# Patient Record
Sex: FEMALE | URBAN - METROPOLITAN AREA
[De-identification: names, ages, dates, MRNs, and addresses within clinical notes are randomized per-mention and may not be internally consistent; named-entity substitution may affect disease eponyms.]

---

## 2023-07-24 ENCOUNTER — TELEPHONE (OUTPATIENT)
Age: 63
End: 2023-07-24

## 2023-08-14 ENCOUNTER — INITIAL CONSULT (OUTPATIENT)
Age: 63
End: 2023-08-14
Payer: SUBSIDIZED

## 2023-08-14 VITALS
HEART RATE: 86 BPM | BODY MASS INDEX: 26.61 KG/M2 | OXYGEN SATURATION: 96 % | SYSTOLIC BLOOD PRESSURE: 124 MMHG | TEMPERATURE: 97.9 F | RESPIRATION RATE: 16 BRPM | WEIGHT: 144.6 LBS | DIASTOLIC BLOOD PRESSURE: 76 MMHG | HEIGHT: 62 IN

## 2023-08-14 DIAGNOSIS — Z17.1 MALIGNANT NEOPLASM OF OVERLAPPING SITES OF RIGHT BREAST IN FEMALE, ESTROGEN RECEPTOR NEGATIVE (HCC): Primary | ICD-10-CM

## 2023-08-14 DIAGNOSIS — C50.811 MALIGNANT NEOPLASM OF OVERLAPPING SITES OF RIGHT BREAST IN FEMALE, ESTROGEN RECEPTOR NEGATIVE (HCC): Primary | ICD-10-CM

## 2023-08-14 PROCEDURE — 99205 OFFICE O/P NEW HI 60 MIN: CPT | Performed by: INTERNAL MEDICINE

## 2023-08-14 RX ORDER — AMLODIPINE BESYLATE 5 MG/1
TABLET ORAL
COMMUNITY
Start: 2023-02-01

## 2023-08-14 RX ORDER — CELECOXIB 200 MG/1
CAPSULE ORAL
COMMUNITY
Start: 2023-05-23

## 2023-08-14 RX ORDER — LIDOCAINE AND PRILOCAINE 25; 25 MG/G; MG/G
CREAM TOPICAL
Qty: 30 G | Refills: 5 | Status: SHIPPED | OUTPATIENT
Start: 2023-08-14

## 2023-08-14 RX ORDER — PROCHLORPERAZINE MALEATE 10 MG
10 TABLET ORAL EVERY 6 HOURS PRN
Qty: 60 TABLET | Refills: 3 | Status: SHIPPED | OUTPATIENT
Start: 2023-08-14

## 2023-08-14 RX ORDER — ONDANSETRON HYDROCHLORIDE 8 MG/1
8 TABLET, FILM COATED ORAL EVERY 8 HOURS PRN
Qty: 60 TABLET | Refills: 3 | Status: SHIPPED | OUTPATIENT
Start: 2023-08-14

## 2023-08-14 RX ORDER — ATORVASTATIN CALCIUM 40 MG/1
TABLET, FILM COATED ORAL
COMMUNITY
Start: 2023-07-28

## 2023-08-14 NOTE — PROGRESS NOTES
Elijah Garland at [facility]  [phone]    08/14/23 Chemotherapy/Immunotherapy education for enhertu and zometa via . Answered all questions. Consent discussed with the patient and the patient denied any questions or concerns. A hard copy of the chemotherapy consent was offered to the patient and the patient declined.
Suzan Eisenberg is a 61 y.o. female new patient here for evaluation of breast cancer.
above.      Assessment/plan:   1. Metastatic recurrent right breast cancer, ER negative, LA negative, HER 2 positive:      Mets to neck LN, hilar LN, left ax LN, liver, lung, bones    Last T-Dxd was 8/10/23, next due 9/5/23    She will cancel the PET for [address] on 8/29/23    Discussed that while this is treatable, it is not curable, goal of therapy is palliative. Will repeat PET scan to compare apples to apples, but will also get CT c/a/p and bone scan    Discussed side effects on T-Dxd including but not limited to alopecia, nausea, vomiting, fatigue, myelosuppression, heart damage, ILD, mouth sores, liver toxicity. She is agreeable to continue and has signed informed consent    Discussed that her EF has been stable for awhile on T-Dxd, will monitor for sx at this time as to not change life sustaining medication for a small number change    With T-Dxd, will get CT scans q 6-9 weeks    Discussed zometa 4 mg q 3 months IV:    We discussed side effects such as ONJ and the need to avoid invasive dental procedures while on these medications, renal damage, and hypocalcemia. Will start this at next visit    We will plan to see the patient in follow up at least once per cycle, or sooner if symptoms warrant. Labs with each cycle for intensive monitoring for toxicity    The duration of this treatment plan will be until progression, intolerable side effects, or patient choice. Rx:  zofran, compazine and emla cream      2. Emotional well being:  She has excellent support and is coping well with her disease    3. DM2:  was on metformin    4. Right Shoulder pain:  on celebrex; referral to PT    > 60 min were spent in this encounter with time spent in face to face discussion, extensive outside record review, and documentation    I appreciate the opportunity to participate in Ms. Georges Moser's care.     Signed By: Colonel Addie MD      Return in about 22 days (around 9/5/2023) for labs, claire, chemo T-Dxd and

## 2023-08-15 DIAGNOSIS — C50.919 MALIGNANT NEOPLASM OF BREAST IN FEMALE, ESTROGEN RECEPTOR NEGATIVE, UNSPECIFIED LATERALITY, UNSPECIFIED SITE OF BREAST (HCC): ICD-10-CM

## 2023-08-15 DIAGNOSIS — Z17.1 MALIGNANT NEOPLASM OF BREAST IN FEMALE, ESTROGEN RECEPTOR NEGATIVE, UNSPECIFIED LATERALITY, UNSPECIFIED SITE OF BREAST (HCC): ICD-10-CM

## 2023-08-18 ENCOUNTER — CLINICAL DOCUMENTATION (OUTPATIENT)
Age: 63
End: 2023-08-18

## 2023-08-18 NOTE — PROGRESS NOTES
Oncology Social Work  Psychosocial Assessment    Reason for Assessment:      [] Social Work Referral [] Initial Assessment [] New Diagnosis [] Other    Advance Care Planning:  Demographics 8/18/2023   Marital Status        Sources of Information:    []Patient  []Family  []Staff  []Medical Record    Mental Status:    []Alert  []Lethargic  []Unresponsive   [] Unable to assess   Oriented to:  []Person  []Place  []Time  []Situation      Relationship Status:  []Single  []  []Significant Other/Life Partner  []  []  []    Living Circumstances:  []Lives Alone  []Family/Significant Other in Household  []Roommates  []Children in the Home  []Paid Caregivers  []Assisted Living Facility/Group Home  [][address]  []Homeless  []Incarcerated  []Environmental/Care Concerns  []Other:    Employment Status:  []Employed Full-time []Employed Part-time []Homemaker  [] Retired [] Short-Term Disability [] Long-Term Disability  [] Unemployed   [] [address][1]   [] [facility]  [] Self-Employment    Barriers to Learning:    []Language  []Developmental  []Cognitive  []Altered Mental Status  []Visual/Hearing Impairment  []Unable to Read/Write  []Motivational  [] Challenges Understanding Medical Jargon []No Barriers Identified      Financial/Legal Concerns:    []Uninsured  []Limited Income/Resources  []Non-Citizen  []Food Insecurity []No Concerns Identified   []Other:    Catholic/Spiritual/Existential:  Does patient have any spiritual or Taoist beliefs? [] Yes [] No  Is the patient involved in a spiritual, nellie or Taoist community?  [] Yes [] No  Patient expressing spiritual/existential angst? [] Yes [] No  Notes:    Support System:    Identified Support Person/Group:  []Strong  []Fair  []Limited    Coping with Illness:   []  Coping Well  [] Challenges Coping with Serious Illness [] Situational Depression [] Situational Anxiety [] Anticipatory Grief  [] Recent Loss [] Caregiver [place]

## 2023-08-21 DIAGNOSIS — Z17.1 MALIGNANT NEOPLASM OF BREAST IN FEMALE, ESTROGEN RECEPTOR NEGATIVE, UNSPECIFIED LATERALITY, UNSPECIFIED SITE OF BREAST (HCC): Primary | ICD-10-CM

## 2023-08-21 DIAGNOSIS — C50.919 MALIGNANT NEOPLASM OF BREAST IN FEMALE, ESTROGEN RECEPTOR NEGATIVE, UNSPECIFIED LATERALITY, UNSPECIFIED SITE OF BREAST (HCC): Primary | ICD-10-CM

## 2023-08-21 RX ORDER — PALONOSETRON 0.05 MG/ML
0.25 INJECTION, SOLUTION INTRAVENOUS ONCE
OUTPATIENT
Start: 2023-09-05 | End: 2023-09-05

## 2023-08-21 RX ORDER — SODIUM CHLORIDE 9 MG/ML
5-250 INJECTION, SOLUTION INTRAVENOUS PRN
OUTPATIENT
Start: 2023-09-05

## 2023-08-21 RX ORDER — DEXTROSE MONOHYDRATE 50 MG/ML
5-250 INJECTION, SOLUTION INTRAVENOUS PRN
OUTPATIENT
Start: 2023-09-05

## 2023-08-21 RX ORDER — SODIUM CHLORIDE 0.9 % (FLUSH) 0.9 %
5-40 SYRINGE (ML) INJECTION PRN
OUTPATIENT
Start: 2023-09-05

## 2023-08-21 RX ORDER — EPINEPHRINE 1 MG/ML
0.3 INJECTION, SOLUTION, CONCENTRATE INTRAVENOUS PRN
OUTPATIENT
Start: 2023-09-05

## 2023-08-21 RX ORDER — DIPHENHYDRAMINE HYDROCHLORIDE 50 MG/ML
50 INJECTION INTRAMUSCULAR; INTRAVENOUS
OUTPATIENT
Start: 2023-09-05

## 2023-08-21 RX ORDER — ALBUTEROL SULFATE 90 UG/1
4 AEROSOL, METERED RESPIRATORY (INHALATION) PRN
OUTPATIENT
Start: 2023-09-05

## 2023-08-21 RX ORDER — ONDANSETRON 2 MG/ML
8 INJECTION INTRAMUSCULAR; INTRAVENOUS
OUTPATIENT
Start: 2023-09-05

## 2023-08-21 RX ORDER — SODIUM CHLORIDE 9 MG/ML
INJECTION, SOLUTION INTRAVENOUS CONTINUOUS
OUTPATIENT
Start: 2023-09-05

## 2023-08-21 RX ORDER — FAMOTIDINE 10 MG/ML
20 INJECTION, SOLUTION INTRAVENOUS
OUTPATIENT
Start: 2023-09-05

## 2023-08-21 RX ORDER — HEPARIN SODIUM (PORCINE) LOCK FLUSH IV SOLN 100 UNIT/ML 100 UNIT/ML
500 SOLUTION INTRAVENOUS PRN
OUTPATIENT
Start: 2023-09-05

## 2023-08-21 RX ORDER — ACETAMINOPHEN 325 MG/1
650 TABLET ORAL
OUTPATIENT
Start: 2023-09-05

## 2023-08-21 RX ORDER — MEPERIDINE HYDROCHLORIDE 50 MG/ML
12.5 INJECTION INTRAMUSCULAR; INTRAVENOUS; SUBCUTANEOUS PRN
OUTPATIENT
Start: 2023-09-05

## 2023-08-28 DIAGNOSIS — C50.811 MALIGNANT NEOPLASM OF OVERLAPPING SITES OF RIGHT BREAST IN FEMALE, ESTROGEN RECEPTOR NEGATIVE (HCC): Primary | ICD-10-CM

## 2023-08-28 DIAGNOSIS — Z17.1 MALIGNANT NEOPLASM OF OVERLAPPING SITES OF RIGHT BREAST IN FEMALE, ESTROGEN RECEPTOR NEGATIVE (HCC): Primary | ICD-10-CM

## 2023-09-05 ENCOUNTER — CLINICAL DOCUMENTATION (OUTPATIENT)
Facility: HOSPITAL | Age: 63
End: 2023-09-05

## 2023-09-05 ENCOUNTER — OFFICE VISIT (OUTPATIENT)
Age: 63
End: 2023-09-05
Payer: SELF-PAY

## 2023-09-05 ENCOUNTER — HOSPITAL ENCOUNTER (OUTPATIENT)
Facility: HOSPITAL | Age: 63
Setting detail: INFUSION SERIES
End: 2023-09-05
Payer: SELF-PAY

## 2023-09-05 VITALS
BODY MASS INDEX: 26.7 KG/M2 | RESPIRATION RATE: 18 BRPM | HEART RATE: 73 BPM | HEIGHT: 62 IN | WEIGHT: 145.1 LBS | DIASTOLIC BLOOD PRESSURE: 69 MMHG | TEMPERATURE: 98 F | SYSTOLIC BLOOD PRESSURE: 118 MMHG

## 2023-09-05 VITALS
RESPIRATION RATE: 17 BRPM | BODY MASS INDEX: 26.51 KG/M2 | DIASTOLIC BLOOD PRESSURE: 69 MMHG | WEIGHT: 145 LBS | HEART RATE: 77 BPM | OXYGEN SATURATION: 97 % | TEMPERATURE: 98 F | SYSTOLIC BLOOD PRESSURE: 116 MMHG

## 2023-09-05 DIAGNOSIS — C50.919 MALIGNANT NEOPLASM OF BREAST IN FEMALE, ESTROGEN RECEPTOR NEGATIVE, UNSPECIFIED LATERALITY, UNSPECIFIED SITE OF BREAST (HCC): Primary | ICD-10-CM

## 2023-09-05 DIAGNOSIS — C50.811 MALIGNANT NEOPLASM OF OVERLAPPING SITES OF RIGHT BREAST IN FEMALE, ESTROGEN RECEPTOR NEGATIVE (HCC): Primary | ICD-10-CM

## 2023-09-05 DIAGNOSIS — Z17.1 MALIGNANT NEOPLASM OF BREAST IN FEMALE, ESTROGEN RECEPTOR NEGATIVE, UNSPECIFIED LATERALITY, UNSPECIFIED SITE OF BREAST (HCC): Primary | ICD-10-CM

## 2023-09-05 DIAGNOSIS — Z17.1 MALIGNANT NEOPLASM OF BREAST IN FEMALE, ESTROGEN RECEPTOR NEGATIVE, UNSPECIFIED LATERALITY, UNSPECIFIED SITE OF BREAST (HCC): ICD-10-CM

## 2023-09-05 DIAGNOSIS — Z17.1 MALIGNANT NEOPLASM OF OVERLAPPING SITES OF RIGHT BREAST IN FEMALE, ESTROGEN RECEPTOR NEGATIVE (HCC): Primary | ICD-10-CM

## 2023-09-05 DIAGNOSIS — C50.919 MALIGNANT NEOPLASM OF BREAST IN FEMALE, ESTROGEN RECEPTOR NEGATIVE, UNSPECIFIED LATERALITY, UNSPECIFIED SITE OF BREAST (HCC): ICD-10-CM

## 2023-09-05 LAB
ALBUMIN SERPL-MCNC: 3.6 G/DL (ref 3.5–5)
ALBUMIN/GLOB SERPL: 0.9 (ref 1.1–2.2)
ALP SERPL-CCNC: 86 U/L (ref 45–117)
ALT SERPL-CCNC: 15 U/L (ref 12–78)
ANION GAP SERPL CALC-SCNC: 5 MMOL/L (ref 5–15)
AST SERPL-CCNC: 33 U/L (ref 15–37)
BASOPHILS # BLD: 0.1 K/UL (ref 0–0.1)
BASOPHILS NFR BLD: 2 % (ref 0–1)
BILIRUB SERPL-MCNC: 0.3 MG/DL (ref 0.2–1)
BUN SERPL-MCNC: 16 MG/DL (ref 6–20)
BUN/CREAT SERPL: 22 (ref 12–20)
CALCIUM SERPL-MCNC: 9 MG/DL (ref 8.5–10.1)
CHLORIDE SERPL-SCNC: 108 MMOL/L (ref 97–108)
CO2 SERPL-SCNC: 27 MMOL/L (ref 21–32)
CREAT SERPL-MCNC: 0.72 MG/DL (ref 0.55–1.02)
DIFFERENTIAL METHOD BLD: ABNORMAL
EOSINOPHIL # BLD: 0 K/UL (ref 0–0.4)
EOSINOPHIL NFR BLD: 1 % (ref 0–7)
ERYTHROCYTE [DISTWIDTH] IN BLOOD BY AUTOMATED COUNT: 16 % (ref 11.5–14.5)
GLOBULIN SER CALC-MCNC: 3.9 G/DL (ref 2–4)
GLUCOSE SERPL-MCNC: 156 MG/DL (ref 65–100)
HCT VFR BLD AUTO: 36.1 % (ref 35–47)
HGB BLD-MCNC: 11.8 G/DL (ref 11.5–16)
IMM GRANULOCYTES # BLD AUTO: 0 K/UL (ref 0–0.04)
IMM GRANULOCYTES NFR BLD AUTO: 0 % (ref 0–0.5)
LYMPHOCYTES # BLD: 1.1 K/UL (ref 0.8–3.5)
LYMPHOCYTES NFR BLD: 24 % (ref 12–49)
MCH RBC QN AUTO: 30.7 PG (ref 26–34)
MCHC RBC AUTO-ENTMCNC: 32.7 G/DL (ref 30–36.5)
MCV RBC AUTO: 94 FL (ref 80–99)
MONOCYTES # BLD: 0.4 K/UL (ref 0–1)
MONOCYTES NFR BLD: 10 % (ref 5–13)
NEUTS SEG # BLD: 2.9 K/UL (ref 1.8–8)
NEUTS SEG NFR BLD: 63 % (ref 32–75)
NRBC # BLD: 0 K/UL (ref 0–0.01)
NRBC BLD-RTO: 0 PER 100 WBC
PLATELET # BLD AUTO: 270 K/UL (ref 150–400)
PMV BLD AUTO: 8.3 FL (ref 8.9–12.9)
POTASSIUM SERPL-SCNC: 3.9 MMOL/L (ref 3.5–5.1)
PROT SERPL-MCNC: 7.5 G/DL (ref 6.4–8.2)
RBC # BLD AUTO: 3.84 M/UL (ref 3.8–5.2)
SODIUM SERPL-SCNC: 140 MMOL/L (ref 136–145)
WBC # BLD AUTO: 4.5 K/UL (ref 3.6–11)

## 2023-09-05 PROCEDURE — 2580000003 HC RX 258: Performed by: INTERNAL MEDICINE

## 2023-09-05 PROCEDURE — 99215 OFFICE O/P EST HI 40 MIN: CPT | Performed by: INTERNAL MEDICINE

## 2023-09-05 PROCEDURE — 80053 COMPREHEN METABOLIC PANEL: CPT

## 2023-09-05 PROCEDURE — 96413 CHEMO IV INFUSION 1 HR: CPT

## 2023-09-05 PROCEDURE — 96375 TX/PRO/DX INJ NEW DRUG ADDON: CPT

## 2023-09-05 PROCEDURE — 6360000002 HC RX W HCPCS: Performed by: NURSE PRACTITIONER

## 2023-09-05 PROCEDURE — 6360000002 HC RX W HCPCS: Performed by: INTERNAL MEDICINE

## 2023-09-05 PROCEDURE — 85025 COMPLETE CBC W/AUTO DIFF WBC: CPT

## 2023-09-05 RX ORDER — SODIUM CHLORIDE 9 MG/ML
5-250 INJECTION, SOLUTION INTRAVENOUS PRN
OUTPATIENT
Start: 2024-03-03

## 2023-09-05 RX ORDER — SODIUM CHLORIDE 0.9 % (FLUSH) 0.9 %
5-40 SYRINGE (ML) INJECTION PRN
OUTPATIENT
Start: 2024-03-03

## 2023-09-05 RX ORDER — ALBUTEROL SULFATE 90 UG/1
4 AEROSOL, METERED RESPIRATORY (INHALATION) PRN
OUTPATIENT
Start: 2024-03-03

## 2023-09-05 RX ORDER — HEPARIN SODIUM (PORCINE) LOCK FLUSH IV SOLN 100 UNIT/ML 100 UNIT/ML
500 SOLUTION INTRAVENOUS PRN
Status: CANCELLED | OUTPATIENT
Start: 2023-09-05

## 2023-09-05 RX ORDER — DIPHENHYDRAMINE HYDROCHLORIDE 50 MG/ML
50 INJECTION INTRAMUSCULAR; INTRAVENOUS
Status: CANCELLED | OUTPATIENT
Start: 2023-09-05

## 2023-09-05 RX ORDER — EPINEPHRINE 1 MG/ML
0.3 INJECTION, SOLUTION, CONCENTRATE INTRAVENOUS PRN
Status: CANCELLED | OUTPATIENT
Start: 2023-09-05

## 2023-09-05 RX ORDER — ONDANSETRON 2 MG/ML
8 INJECTION INTRAMUSCULAR; INTRAVENOUS
Status: CANCELLED | OUTPATIENT
Start: 2023-09-05

## 2023-09-05 RX ORDER — SODIUM CHLORIDE 9 MG/ML
INJECTION, SOLUTION INTRAVENOUS CONTINUOUS
OUTPATIENT
Start: 2024-03-03

## 2023-09-05 RX ORDER — ZOLEDRONIC ACID 0.04 MG/ML
4 INJECTION, SOLUTION INTRAVENOUS ONCE
Status: CANCELLED | OUTPATIENT
Start: 2023-09-05 | End: 2023-09-05

## 2023-09-05 RX ORDER — SODIUM CHLORIDE 0.9 % (FLUSH) 0.9 %
5-40 SYRINGE (ML) INJECTION PRN
Status: CANCELLED | OUTPATIENT
Start: 2023-09-05

## 2023-09-05 RX ORDER — HEPARIN 100 UNIT/ML
500 SYRINGE INTRAVENOUS PRN
OUTPATIENT
Start: 2024-03-03

## 2023-09-05 RX ORDER — ZOLEDRONIC ACID 0.04 MG/ML
4 INJECTION, SOLUTION INTRAVENOUS ONCE
Status: COMPLETED | OUTPATIENT
Start: 2023-09-05 | End: 2023-09-05

## 2023-09-05 RX ORDER — SODIUM CHLORIDE 9 MG/ML
5-250 INJECTION, SOLUTION INTRAVENOUS PRN
Status: DISCONTINUED | OUTPATIENT
Start: 2023-09-05 | End: 2023-09-06 | Stop reason: HOSPADM

## 2023-09-05 RX ORDER — DIPHENHYDRAMINE HYDROCHLORIDE 50 MG/ML
50 INJECTION INTRAMUSCULAR; INTRAVENOUS
OUTPATIENT
Start: 2024-03-03

## 2023-09-05 RX ORDER — ZOLEDRONIC ACID 0.04 MG/ML
4 INJECTION, SOLUTION INTRAVENOUS ONCE
Status: CANCELLED | OUTPATIENT
Start: 2024-03-03 | End: 2024-03-03

## 2023-09-05 RX ORDER — EPINEPHRINE 1 MG/ML
0.3 INJECTION, SOLUTION, CONCENTRATE INTRAVENOUS PRN
OUTPATIENT
Start: 2024-03-03

## 2023-09-05 RX ORDER — SODIUM CHLORIDE 0.9 % (FLUSH) 0.9 %
5-40 SYRINGE (ML) INJECTION PRN
Status: DISCONTINUED | OUTPATIENT
Start: 2023-09-05 | End: 2023-09-06 | Stop reason: HOSPADM

## 2023-09-05 RX ORDER — SODIUM CHLORIDE 9 MG/ML
5-250 INJECTION, SOLUTION INTRAVENOUS PRN
Status: CANCELLED | OUTPATIENT
Start: 2023-09-05

## 2023-09-05 RX ORDER — FAMOTIDINE 10 MG/ML
20 INJECTION, SOLUTION INTRAVENOUS
Status: CANCELLED | OUTPATIENT
Start: 2023-09-05

## 2023-09-05 RX ORDER — SODIUM CHLORIDE 9 MG/ML
INJECTION, SOLUTION INTRAVENOUS CONTINUOUS
Status: CANCELLED | OUTPATIENT
Start: 2023-09-05

## 2023-09-05 RX ORDER — ACETAMINOPHEN 325 MG/1
650 TABLET ORAL
Status: CANCELLED | OUTPATIENT
Start: 2023-09-05

## 2023-09-05 RX ORDER — ACETAMINOPHEN 325 MG/1
650 TABLET ORAL
OUTPATIENT
Start: 2024-03-03

## 2023-09-05 RX ORDER — DEXTROSE MONOHYDRATE 50 MG/ML
5-250 INJECTION, SOLUTION INTRAVENOUS PRN
Status: DISCONTINUED | OUTPATIENT
Start: 2023-09-05 | End: 2023-09-06 | Stop reason: HOSPADM

## 2023-09-05 RX ORDER — PALONOSETRON 0.05 MG/ML
0.25 INJECTION, SOLUTION INTRAVENOUS ONCE
Status: COMPLETED | OUTPATIENT
Start: 2023-09-05 | End: 2023-09-05

## 2023-09-05 RX ORDER — SODIUM CHLORIDE 9 MG/ML
5-250 INJECTION, SOLUTION INTRAVENOUS PRN
Status: CANCELLED | OUTPATIENT
Start: 2024-03-03

## 2023-09-05 RX ORDER — ALBUTEROL SULFATE 90 UG/1
4 AEROSOL, METERED RESPIRATORY (INHALATION) PRN
Status: CANCELLED | OUTPATIENT
Start: 2023-09-05

## 2023-09-05 RX ORDER — HEPARIN 100 UNIT/ML
500 SYRINGE INTRAVENOUS PRN
Status: DISCONTINUED | OUTPATIENT
Start: 2023-09-05 | End: 2023-09-06 | Stop reason: HOSPADM

## 2023-09-05 RX ORDER — ONDANSETRON 2 MG/ML
8 INJECTION INTRAMUSCULAR; INTRAVENOUS
OUTPATIENT
Start: 2024-03-03

## 2023-09-05 RX ADMIN — ZOLEDRONIC ACID 4 MG: 4 SOLUTION INTRAVENOUS at 14:19

## 2023-09-05 RX ADMIN — DEXTROSE MONOHYDRATE 25 ML/HR: 50 INJECTION, SOLUTION INTRAVENOUS at 14:50

## 2023-09-05 RX ADMIN — FAM-TRASTUZUMAB DERUXTECAN-NXKI 354 MG: 100 INJECTION, POWDER, LYOPHILIZED, FOR SOLUTION INTRAVENOUS at 14:53

## 2023-09-05 RX ADMIN — DEXAMETHASONE SODIUM PHOSPHATE 12 MG: 4 INJECTION, SOLUTION INTRA-ARTICULAR; INTRALESIONAL; INTRAMUSCULAR; INTRAVENOUS; SOFT TISSUE at 13:59

## 2023-09-05 RX ADMIN — PALONOSETRON HYDROCHLORIDE 0.25 MG: 0.25 INJECTION INTRAVENOUS at 13:56

## 2023-09-05 ASSESSMENT — PAIN SCALES - GENERAL: PAINLEVEL_OUTOF10: 0

## 2023-09-05 NOTE — PROGRESS NOTES
[facility] [place] at [address], [address] [place] [address] [place]: [phone]  F: [phone]      Reason for Visit:   Vanessa Egan is a 61 y.o. female who has transferred to me from [place]. Treatment History:   2016:  Original right stage 3 breast cancer, s/p mastectomy with 5 cm IDC, 3/19 LN, ER negative, [place] negative, HER 2 +, treated with chemotherapy (unknown) and XRT (in [place])  9/2018:  recurrent breast cancer to her neck, tx with docetaxel q 3 weeks x 9, completed 4/12/2019 (PD) Glenroy Gee)  6/25/19 L ax LN FNA:  poorly differentiated carcinoma, c/w breast cancer, ER negative, [place] negative, HER 2 positive at [facility] 3+  Tempus testing:  HER 2 overexpression  7/26/19 Trastuzumab/pertuzumab/paclitaxel until 7/2021 after 32 cycles (PD) [place], [place])  XRT to right neck 1/2021  T-DM1 8/10/21-  T-Dxd 7/2022-   Right shoulder dislocation and infection 11/17/22  Zometa 9/5/2023 -     History of Present Illness:   Recently moved to [place] to live with daughter. Interval Hx: Patient presents today for follow up and treatment. Reports gr 1 edema to right arm - no other symptoms. Entire visit was done with Yamilex Martin from [facility] services    History reviewed. No pertinent past medical history. History reviewed. No pertinent surgical history. Social History     Tobacco Use    Smoking status: Never    Smokeless tobacco: Never   Substance Use Topics    Alcohol use: Not on file      No family history on file.   Current Outpatient Medications   Medication Sig    celecoxib (CELEBREX) 200 MG capsule 1 cap(s) orally once a day    amLODIPine (NORVASC) 5 MG tablet 1 tab(s) orally once a day    atorvastatin (LIPITOR) 40 MG tablet TAKE 1 TABLET BY MOUTH ONCE DAILY FOR 30 DAYS    ondansetron (ZOFRAN) 8 MG tablet Take 1 tablet by mouth every 8 hours as needed for Nausea or Vomiting    prochlorperazine (COMPAZINE) 10 MG tablet Take 1 tablet by mouth every 6 hours as needed

## 2023-09-05 NOTE — PROGRESS NOTES
Alice Ricketts is a 61 y.o. female here today to follow up for breast cancer    1. Have you been to the ER, urgent care clinic since your last visit? Hospitalized since your last visit?no    2. Have you seen or consulted any other health care providers outside of the [address] [place] [address] since your last visit? Include any pap smears or colon screening.  no

## 2023-09-19 RX ORDER — SODIUM CHLORIDE 9 MG/ML
5-250 INJECTION, SOLUTION INTRAVENOUS PRN
Status: CANCELLED | OUTPATIENT
Start: 2023-09-26

## 2023-09-19 RX ORDER — PALONOSETRON 0.05 MG/ML
0.25 INJECTION, SOLUTION INTRAVENOUS ONCE
Status: CANCELLED | OUTPATIENT
Start: 2023-09-26 | End: 2023-09-26

## 2023-09-19 RX ORDER — EPINEPHRINE 1 MG/ML
0.3 INJECTION, SOLUTION, CONCENTRATE INTRAVENOUS PRN
Status: CANCELLED | OUTPATIENT
Start: 2023-09-26

## 2023-09-19 RX ORDER — DIPHENHYDRAMINE HYDROCHLORIDE 50 MG/ML
50 INJECTION INTRAMUSCULAR; INTRAVENOUS
Status: CANCELLED | OUTPATIENT
Start: 2023-09-26

## 2023-09-19 RX ORDER — DEXTROSE MONOHYDRATE 50 MG/ML
5-250 INJECTION, SOLUTION INTRAVENOUS PRN
Status: CANCELLED | OUTPATIENT
Start: 2023-09-26

## 2023-09-19 RX ORDER — ONDANSETRON 2 MG/ML
8 INJECTION INTRAMUSCULAR; INTRAVENOUS
Status: CANCELLED | OUTPATIENT
Start: 2023-09-26

## 2023-09-19 RX ORDER — ALBUTEROL SULFATE 90 UG/1
4 AEROSOL, METERED RESPIRATORY (INHALATION) PRN
Status: CANCELLED | OUTPATIENT
Start: 2023-09-26

## 2023-09-19 RX ORDER — SODIUM CHLORIDE 9 MG/ML
INJECTION, SOLUTION INTRAVENOUS CONTINUOUS
Status: CANCELLED | OUTPATIENT
Start: 2023-09-26

## 2023-09-19 RX ORDER — ACETAMINOPHEN 325 MG/1
650 TABLET ORAL
Status: CANCELLED | OUTPATIENT
Start: 2023-09-26

## 2023-09-19 RX ORDER — HEPARIN 100 UNIT/ML
500 SYRINGE INTRAVENOUS PRN
Status: CANCELLED | OUTPATIENT
Start: 2023-09-26

## 2023-09-19 RX ORDER — MEPERIDINE HYDROCHLORIDE 25 MG/ML
12.5 INJECTION INTRAMUSCULAR; INTRAVENOUS; SUBCUTANEOUS PRN
Status: CANCELLED | OUTPATIENT
Start: 2023-09-26

## 2023-09-19 RX ORDER — SODIUM CHLORIDE 0.9 % (FLUSH) 0.9 %
5-40 SYRINGE (ML) INJECTION PRN
Status: CANCELLED | OUTPATIENT
Start: 2023-09-26

## 2023-09-21 ENCOUNTER — HOSPITAL ENCOUNTER (OUTPATIENT)
Facility: HOSPITAL | Age: 63
Discharge: HOME | End: 2023-09-21
Attending: INTERNAL MEDICINE
Payer: SUBSIDIZED

## 2023-09-21 ENCOUNTER — HOSPITAL ENCOUNTER (OUTPATIENT)
Facility: HOSPITAL | Age: 63
End: 2023-09-21
Attending: INTERNAL MEDICINE
Payer: SUBSIDIZED

## 2023-09-21 DIAGNOSIS — Z17.1 MALIGNANT NEOPLASM OF OVERLAPPING SITES OF RIGHT BREAST IN FEMALE, ESTROGEN RECEPTOR NEGATIVE (HCC): ICD-10-CM

## 2023-09-21 DIAGNOSIS — C50.811 MALIGNANT NEOPLASM OF OVERLAPPING SITES OF RIGHT BREAST IN FEMALE, ESTROGEN RECEPTOR NEGATIVE (HCC): ICD-10-CM

## 2023-09-21 LAB
GLUCOSE BLD STRIP.AUTO-MCNC: 134 MG/DL (ref 65–117)
SERVICE CMNT-IMP: ABNORMAL

## 2023-09-21 PROCEDURE — A9552 F18 FDG: HCPCS | Performed by: INTERNAL MEDICINE

## 2023-09-21 PROCEDURE — 82962 GLUCOSE BLOOD TEST: CPT

## 2023-09-21 PROCEDURE — 78815 PET IMAGE W/CT SKULL-THIGH: CPT

## 2023-09-21 PROCEDURE — 3430000000 HC RX DIAGNOSTIC RADIOPHARMACEUTICAL: Performed by: INTERNAL MEDICINE

## 2023-09-21 RX ORDER — FLUDEOXYGLUCOSE F-18 500 MCI/ML
10 INJECTION INTRAVENOUS ONCE
Status: COMPLETED | OUTPATIENT
Start: 2023-09-21 | End: 2023-09-21

## 2023-09-21 RX ADMIN — FLUDEOXYGLUCOSE F-18 10 MILLICURIE: 500 INJECTION INTRAVENOUS at 07:50

## 2023-09-25 ENCOUNTER — HOSPITAL ENCOUNTER (OUTPATIENT)
Facility: HOSPITAL | Age: 63
Discharge: HOME | End: 2023-09-28
Attending: INTERNAL MEDICINE
Payer: SUBSIDIZED

## 2023-09-25 DIAGNOSIS — Z17.1 MALIGNANT NEOPLASM OF OVERLAPPING SITES OF RIGHT BREAST IN FEMALE, ESTROGEN RECEPTOR NEGATIVE (HCC): ICD-10-CM

## 2023-09-25 DIAGNOSIS — C50.811 MALIGNANT NEOPLASM OF OVERLAPPING SITES OF RIGHT BREAST IN FEMALE, ESTROGEN RECEPTOR NEGATIVE (HCC): ICD-10-CM

## 2023-09-25 PROCEDURE — 78306 BONE IMAGING WHOLE BODY: CPT | Performed by: INTERNAL MEDICINE

## 2023-09-25 PROCEDURE — 71260 CT THORAX DX C+: CPT

## 2023-09-25 PROCEDURE — A9503 TC99M MEDRONATE: HCPCS | Performed by: INTERNAL MEDICINE

## 2023-09-25 PROCEDURE — 6360000004 HC RX CONTRAST MEDICATION: Performed by: INTERNAL MEDICINE

## 2023-09-25 PROCEDURE — 3430000000 HC RX DIAGNOSTIC RADIOPHARMACEUTICAL: Performed by: INTERNAL MEDICINE

## 2023-09-25 PROCEDURE — 6360000002 HC RX W HCPCS: Performed by: INTERNAL MEDICINE

## 2023-09-25 RX ORDER — HEPARIN 100 UNIT/ML
500 SYRINGE INTRAVENOUS ONCE
Status: COMPLETED | OUTPATIENT
Start: 2023-09-25 | End: 2023-09-25

## 2023-09-25 RX ORDER — TC 99M MEDRONATE 20 MG/10ML
25.4 INJECTION, POWDER, LYOPHILIZED, FOR SOLUTION INTRAVENOUS
Status: COMPLETED | OUTPATIENT
Start: 2023-09-25 | End: 2023-09-25

## 2023-09-25 RX ADMIN — IOPAMIDOL 100 ML: 755 INJECTION, SOLUTION INTRAVENOUS at 08:18

## 2023-09-25 RX ADMIN — TC 99M MEDRONATE 25.4 MILLICURIE: 20 INJECTION, POWDER, LYOPHILIZED, FOR SOLUTION INTRAVENOUS at 07:55

## 2023-09-25 RX ADMIN — HEPARIN 500 UNITS: 100 SYRINGE at 08:25

## 2023-09-26 ENCOUNTER — HOSPITAL ENCOUNTER (OUTPATIENT)
Facility: HOSPITAL | Age: 63
Setting detail: INFUSION SERIES
End: 2023-09-26
Payer: SELF-PAY

## 2023-09-26 ENCOUNTER — CLINICAL DOCUMENTATION (OUTPATIENT)
Age: 63
End: 2023-09-26

## 2023-09-26 ENCOUNTER — OFFICE VISIT (OUTPATIENT)
Age: 63
End: 2023-09-26
Payer: SELF-PAY

## 2023-09-26 VITALS
HEART RATE: 82 BPM | HEIGHT: 62 IN | BODY MASS INDEX: 26.31 KG/M2 | OXYGEN SATURATION: 95 % | SYSTOLIC BLOOD PRESSURE: 126 MMHG | RESPIRATION RATE: 16 BRPM | TEMPERATURE: 98.1 F | DIASTOLIC BLOOD PRESSURE: 63 MMHG | WEIGHT: 143 LBS

## 2023-09-26 VITALS
HEART RATE: 82 BPM | DIASTOLIC BLOOD PRESSURE: 63 MMHG | TEMPERATURE: 98.1 F | OXYGEN SATURATION: 96 % | RESPIRATION RATE: 16 BRPM | BODY MASS INDEX: 26.15 KG/M2 | WEIGHT: 143 LBS | SYSTOLIC BLOOD PRESSURE: 126 MMHG

## 2023-09-26 DIAGNOSIS — Z17.1 MALIGNANT NEOPLASM OF BREAST IN FEMALE, ESTROGEN RECEPTOR NEGATIVE, UNSPECIFIED LATERALITY, UNSPECIFIED SITE OF BREAST (HCC): Primary | ICD-10-CM

## 2023-09-26 DIAGNOSIS — Z17.1 MALIGNANT NEOPLASM OF OVERLAPPING SITES OF RIGHT BREAST IN FEMALE, ESTROGEN RECEPTOR NEGATIVE (HCC): Primary | ICD-10-CM

## 2023-09-26 DIAGNOSIS — R05.1 ACUTE COUGH: ICD-10-CM

## 2023-09-26 DIAGNOSIS — C50.919 MALIGNANT NEOPLASM OF BREAST IN FEMALE, ESTROGEN RECEPTOR NEGATIVE, UNSPECIFIED LATERALITY, UNSPECIFIED SITE OF BREAST (HCC): Primary | ICD-10-CM

## 2023-09-26 DIAGNOSIS — C50.811 MALIGNANT NEOPLASM OF OVERLAPPING SITES OF RIGHT BREAST IN FEMALE, ESTROGEN RECEPTOR NEGATIVE (HCC): Primary | ICD-10-CM

## 2023-09-26 LAB
ALBUMIN SERPL-MCNC: 3.3 G/DL (ref 3.5–5)
ALBUMIN/GLOB SERPL: 0.8 (ref 1.1–2.2)
ALP SERPL-CCNC: 82 U/L (ref 45–117)
ALT SERPL-CCNC: 19 U/L (ref 12–78)
ANION GAP SERPL CALC-SCNC: 5 MMOL/L (ref 5–15)
AST SERPL-CCNC: 41 U/L (ref 15–37)
BASOPHILS # BLD: 0 K/UL (ref 0–0.1)
BASOPHILS NFR BLD: 1 % (ref 0–1)
BILIRUB SERPL-MCNC: 0.5 MG/DL (ref 0.2–1)
BUN SERPL-MCNC: 14 MG/DL (ref 6–20)
BUN/CREAT SERPL: 15 (ref 12–20)
CALCIUM SERPL-MCNC: 9.5 MG/DL (ref 8.5–10.1)
CHLORIDE SERPL-SCNC: 108 MMOL/L (ref 97–108)
CO2 SERPL-SCNC: 29 MMOL/L (ref 21–32)
CREAT SERPL-MCNC: 0.91 MG/DL (ref 0.55–1.02)
DIFFERENTIAL METHOD BLD: ABNORMAL
EOSINOPHIL # BLD: 0 K/UL (ref 0–0.4)
EOSINOPHIL NFR BLD: 1 % (ref 0–7)
ERYTHROCYTE [DISTWIDTH] IN BLOOD BY AUTOMATED COUNT: 17.2 % (ref 11.5–14.5)
GLOBULIN SER CALC-MCNC: 4.3 G/DL (ref 2–4)
GLUCOSE SERPL-MCNC: 197 MG/DL (ref 65–100)
HCT VFR BLD AUTO: 35.4 % (ref 35–47)
HGB BLD-MCNC: 11.4 G/DL (ref 11.5–16)
IMM GRANULOCYTES # BLD AUTO: 0 K/UL (ref 0–0.04)
IMM GRANULOCYTES NFR BLD AUTO: 0 % (ref 0–0.5)
LYMPHOCYTES # BLD: 0.9 K/UL (ref 0.8–3.5)
LYMPHOCYTES NFR BLD: 25 % (ref 12–49)
MCH RBC QN AUTO: 30.5 PG (ref 26–34)
MCHC RBC AUTO-ENTMCNC: 32.2 G/DL (ref 30–36.5)
MCV RBC AUTO: 94.7 FL (ref 80–99)
MONOCYTES # BLD: 0.4 K/UL (ref 0–1)
MONOCYTES NFR BLD: 10 % (ref 5–13)
NEUTS SEG # BLD: 2.4 K/UL (ref 1.8–8)
NEUTS SEG NFR BLD: 63 % (ref 32–75)
NRBC # BLD: 0 K/UL (ref 0–0.01)
NRBC BLD-RTO: 0 PER 100 WBC
PLATELET # BLD AUTO: 366 K/UL (ref 150–400)
PMV BLD AUTO: 9 FL (ref 8.9–12.9)
POTASSIUM SERPL-SCNC: 3 MMOL/L (ref 3.5–5.1)
PROT SERPL-MCNC: 7.6 G/DL (ref 6.4–8.2)
RBC # BLD AUTO: 3.74 M/UL (ref 3.8–5.2)
SODIUM SERPL-SCNC: 142 MMOL/L (ref 136–145)
WBC # BLD AUTO: 3.8 K/UL (ref 3.6–11)

## 2023-09-26 PROCEDURE — 36415 COLL VENOUS BLD VENIPUNCTURE: CPT

## 2023-09-26 PROCEDURE — 6370000000 HC RX 637 (ALT 250 FOR IP): Performed by: NURSE PRACTITIONER

## 2023-09-26 PROCEDURE — 99215 OFFICE O/P EST HI 40 MIN: CPT | Performed by: INTERNAL MEDICINE

## 2023-09-26 PROCEDURE — 85025 COMPLETE CBC W/AUTO DIFF WBC: CPT

## 2023-09-26 PROCEDURE — 36591 DRAW BLOOD OFF VENOUS DEVICE: CPT

## 2023-09-26 PROCEDURE — 80053 COMPREHEN METABOLIC PANEL: CPT

## 2023-09-26 RX ORDER — PREDNISONE 10 MG/1
30 TABLET ORAL DAILY
Qty: 10 TABLET | Refills: 0 | Status: CANCELLED | OUTPATIENT
Start: 2023-09-26 | End: 2023-10-06

## 2023-09-26 RX ORDER — AZITHROMYCIN 250 MG/1
250 TABLET, FILM COATED ORAL SEE ADMIN INSTRUCTIONS
Qty: 6 TABLET | Refills: 0 | Status: CANCELLED | OUTPATIENT
Start: 2023-09-26 | End: 2023-10-01

## 2023-09-26 RX ORDER — AZITHROMYCIN 250 MG/1
250 TABLET, FILM COATED ORAL SEE ADMIN INSTRUCTIONS
Qty: 6 TABLET | Refills: 0 | Status: SHIPPED | OUTPATIENT
Start: 2023-09-26 | End: 2023-10-01

## 2023-09-26 RX ORDER — POTASSIUM CHLORIDE 750 MG/1
40 TABLET, FILM COATED, EXTENDED RELEASE ORAL ONCE
Status: DISCONTINUED | OUTPATIENT
Start: 2023-09-26 | End: 2023-09-26

## 2023-09-26 RX ORDER — POTASSIUM CHLORIDE 750 MG/1
60 TABLET, EXTENDED RELEASE ORAL ONCE
Status: COMPLETED | OUTPATIENT
Start: 2023-09-26 | End: 2023-09-26

## 2023-09-26 RX ORDER — PREDNISONE 10 MG/1
TABLET ORAL
Qty: 70 TABLET | Refills: 0 | Status: SHIPPED | OUTPATIENT
Start: 2023-09-26 | End: 2023-10-24

## 2023-09-26 RX ADMIN — POTASSIUM CHLORIDE 60 MEQ: 750 TABLET, EXTENDED RELEASE ORAL at 13:20

## 2023-09-26 NOTE — PROGRESS NOTES
Javier Sparrow is a 61 y.o. female here today to follow up for breast cancer     1. Have you been to the ER, urgent care clinic since your last visit? Hospitalized since your last visit?no    2. Have you seen or consulted any other health care providers outside of the [address] [place] [address] since your last visit? Include any pap smears or colon screening.  no
lesion detection sensitivity is low. PERITONEUM/ MESENTERY/BOWEL:   *   Physiologic tracer excretion in the bowel loops and therefore underlying lesion detection sensitivity is low. There is no bowel obstruction or ascites. SUBDIAPHRAGMATIC LYMPH NODES:   *   No hypermetabolic lymphadenopathy in the scanned regions. PELVIS:   *   No hypermetabolic pelvic mass lesion. No free fluid in the pelvis. Physiologic tracer accumulation in the bladder and therefore underlying lesion detection sensitivity is low. BONES AND SOFT TISSUES:   *  Status post right mastectomy. Mild FDG uptake in the left breast with maximum SUV of 2.3 is seen. No hypermetabolic focal lytic or sclerotic bone lesions in the scanned regions. Bilateral gluteal subcutaneous fat stranding and tracer activity likely due to injections. Field of view truncation of CT images of the upper extremities limiting evaluation. Diffusely increased tracer activity in both shoulder joints right more than left and likely inflammatory in nature        PET 9/21/23  IMPRESSION:  Bilateral pulmonary infiltrates are mildly hypermetabolic,  underlying neoplasm is difficult to exclude. Minimally hypermetabolic old left  rib fracture. Diffuse uptake involving the right shoulder joint may be  inflammatory in nature. There is otherwise no PET avid evidence to suggest  metastatic disease. CT C/A/P 9/26/23  IMPRESSION:  3 liver lesions as described above, only one of which can be characterized  convincingly as a hemangioma. The other 2 are of uncertain etiology but are not  hypermetabolic on recent PET/CT. Bilateral pulmonary infiltrates are stable  compared to the prior PET/CT, close follow-up is recommended to ensure lack of  underlying neoplasm. Bone Scan 9/25/23  IMPRESSION:  1. There is marked increased activity overlying right humeral head and AC joint  most likely degenerative. Continued close follow-up would be helpful.   2. Activity left 10th rib

## 2023-09-26 NOTE — PROGRESS NOTES
Suspected ILD with Enhertu. Ordered prednisone taper over 4 weeks. https://daiichisanIotum. us/wvaboubvkoj-lcoohdqbmjc-ndfidai/getPIContent?productName=Enhertu&inline=true    HandMask.cz. https://ascopubs.org/doi/full/10.1200/op. 22.13741#_i8    For Pharmacy Admin Tracking Only    Program: Medical Group  CPA in place:  Yes  Recommendation Provided To: Provider: 1 via Verbally to provider  Intervention Detail: New Rx: 1, reason: Needs Additional Therapy  Intervention Accepted By: Provider: 1  Time Spent (min): 60

## 2023-10-11 DIAGNOSIS — Z17.1 MALIGNANT NEOPLASM OF BREAST IN FEMALE, ESTROGEN RECEPTOR NEGATIVE, UNSPECIFIED LATERALITY, UNSPECIFIED SITE OF BREAST (HCC): Primary | ICD-10-CM

## 2023-10-11 DIAGNOSIS — C50.919 MALIGNANT NEOPLASM OF BREAST IN FEMALE, ESTROGEN RECEPTOR NEGATIVE, UNSPECIFIED LATERALITY, UNSPECIFIED SITE OF BREAST (HCC): Primary | ICD-10-CM

## 2023-10-11 RX ORDER — ONDANSETRON 2 MG/ML
8 INJECTION INTRAMUSCULAR; INTRAVENOUS
OUTPATIENT
Start: 2023-10-17

## 2023-10-11 RX ORDER — SODIUM CHLORIDE 9 MG/ML
INJECTION, SOLUTION INTRAVENOUS CONTINUOUS
OUTPATIENT
Start: 2023-10-17

## 2023-10-11 RX ORDER — PALONOSETRON 0.05 MG/ML
0.25 INJECTION, SOLUTION INTRAVENOUS ONCE
OUTPATIENT
Start: 2023-10-17 | End: 2023-10-17

## 2023-10-11 RX ORDER — DIPHENHYDRAMINE HYDROCHLORIDE 50 MG/ML
50 INJECTION INTRAMUSCULAR; INTRAVENOUS
OUTPATIENT
Start: 2023-10-17

## 2023-10-11 RX ORDER — ALBUTEROL SULFATE 90 UG/1
4 AEROSOL, METERED RESPIRATORY (INHALATION) PRN
OUTPATIENT
Start: 2023-10-17

## 2023-10-11 RX ORDER — FAMOTIDINE 10 MG/ML
20 INJECTION, SOLUTION INTRAVENOUS
OUTPATIENT
Start: 2023-10-17

## 2023-10-11 RX ORDER — MEPERIDINE HYDROCHLORIDE 50 MG/ML
12.5 INJECTION INTRAMUSCULAR; INTRAVENOUS; SUBCUTANEOUS PRN
OUTPATIENT
Start: 2023-10-17

## 2023-10-11 RX ORDER — DEXTROSE MONOHYDRATE 50 MG/ML
5-250 INJECTION, SOLUTION INTRAVENOUS PRN
OUTPATIENT
Start: 2023-10-17

## 2023-10-11 RX ORDER — ACETAMINOPHEN 325 MG/1
650 TABLET ORAL
OUTPATIENT
Start: 2023-10-17

## 2023-10-11 RX ORDER — EPINEPHRINE 1 MG/ML
0.3 INJECTION, SOLUTION, CONCENTRATE INTRAVENOUS PRN
OUTPATIENT
Start: 2023-10-17

## 2023-10-11 RX ORDER — HEPARIN SODIUM (PORCINE) LOCK FLUSH IV SOLN 100 UNIT/ML 100 UNIT/ML
500 SOLUTION INTRAVENOUS PRN
OUTPATIENT
Start: 2023-10-17

## 2023-10-11 RX ORDER — SODIUM CHLORIDE 0.9 % (FLUSH) 0.9 %
5-40 SYRINGE (ML) INJECTION PRN
OUTPATIENT
Start: 2023-10-17

## 2023-10-11 RX ORDER — SODIUM CHLORIDE 9 MG/ML
5-250 INJECTION, SOLUTION INTRAVENOUS PRN
OUTPATIENT
Start: 2023-10-17

## 2023-10-12 ENCOUNTER — HOSPITAL ENCOUNTER (OUTPATIENT)
Facility: HOSPITAL | Age: 63
Discharge: HOME | End: 2023-10-12
Attending: INTERNAL MEDICINE
Payer: SELF-PAY

## 2023-10-12 DIAGNOSIS — Z17.1 MALIGNANT NEOPLASM OF OVERLAPPING SITES OF RIGHT BREAST IN FEMALE, ESTROGEN RECEPTOR NEGATIVE (HCC): ICD-10-CM

## 2023-10-12 DIAGNOSIS — C50.811 MALIGNANT NEOPLASM OF OVERLAPPING SITES OF RIGHT BREAST IN FEMALE, ESTROGEN RECEPTOR NEGATIVE (HCC): ICD-10-CM

## 2023-10-12 PROCEDURE — 71250 CT THORAX DX C-: CPT

## 2023-10-17 ENCOUNTER — OFFICE VISIT (OUTPATIENT)
Age: 63
End: 2023-10-17

## 2023-10-17 ENCOUNTER — HOSPITAL ENCOUNTER (OUTPATIENT)
Facility: HOSPITAL | Age: 63
Setting detail: INFUSION SERIES
End: 2023-10-17

## 2023-10-17 VITALS
WEIGHT: 143.2 LBS | SYSTOLIC BLOOD PRESSURE: 122 MMHG | RESPIRATION RATE: 16 BRPM | HEIGHT: 62 IN | HEART RATE: 77 BPM | OXYGEN SATURATION: 99 % | BODY MASS INDEX: 26.35 KG/M2 | DIASTOLIC BLOOD PRESSURE: 65 MMHG | TEMPERATURE: 97.9 F

## 2023-10-17 VITALS
WEIGHT: 143 LBS | HEART RATE: 77 BPM | OXYGEN SATURATION: 99 % | DIASTOLIC BLOOD PRESSURE: 65 MMHG | RESPIRATION RATE: 16 BRPM | TEMPERATURE: 97.9 F | SYSTOLIC BLOOD PRESSURE: 122 MMHG | BODY MASS INDEX: 26.16 KG/M2

## 2023-10-17 DIAGNOSIS — Z17.1 MALIGNANT NEOPLASM OF BREAST IN FEMALE, ESTROGEN RECEPTOR NEGATIVE, UNSPECIFIED LATERALITY, UNSPECIFIED SITE OF BREAST (HCC): ICD-10-CM

## 2023-10-17 DIAGNOSIS — C50.919 MALIGNANT NEOPLASM OF BREAST IN FEMALE, ESTROGEN RECEPTOR NEGATIVE, UNSPECIFIED LATERALITY, UNSPECIFIED SITE OF BREAST (HCC): ICD-10-CM

## 2023-10-17 DIAGNOSIS — C50.811 MALIGNANT NEOPLASM OF OVERLAPPING SITES OF RIGHT BREAST IN FEMALE, ESTROGEN RECEPTOR NEGATIVE (HCC): Primary | ICD-10-CM

## 2023-10-17 DIAGNOSIS — Z17.1 MALIGNANT NEOPLASM OF OVERLAPPING SITES OF RIGHT BREAST IN FEMALE, ESTROGEN RECEPTOR NEGATIVE (HCC): Primary | ICD-10-CM

## 2023-10-17 DIAGNOSIS — M19.90 ARTHRITIS: ICD-10-CM

## 2023-10-17 LAB
ALBUMIN SERPL-MCNC: 3.5 G/DL (ref 3.5–5)
ALBUMIN/GLOB SERPL: 0.9 (ref 1.1–2.2)
ALP SERPL-CCNC: 64 U/L (ref 45–117)
ALT SERPL-CCNC: 20 U/L (ref 12–78)
ANION GAP SERPL CALC-SCNC: 8 MMOL/L (ref 5–15)
AST SERPL-CCNC: 31 U/L (ref 15–37)
BASOPHILS # BLD: 0 K/UL (ref 0–0.1)
BASOPHILS NFR BLD: 0 % (ref 0–1)
BILIRUB SERPL-MCNC: 0.8 MG/DL (ref 0.2–1)
BUN SERPL-MCNC: 24 MG/DL (ref 6–20)
BUN/CREAT SERPL: 23 (ref 12–20)
CALCIUM SERPL-MCNC: 9.1 MG/DL (ref 8.5–10.1)
CHLORIDE SERPL-SCNC: 103 MMOL/L (ref 97–108)
CO2 SERPL-SCNC: 29 MMOL/L (ref 21–32)
CREAT SERPL-MCNC: 1.06 MG/DL (ref 0.55–1.02)
DIFFERENTIAL METHOD BLD: ABNORMAL
EOSINOPHIL # BLD: 0 K/UL (ref 0–0.4)
EOSINOPHIL NFR BLD: 0 % (ref 0–7)
ERYTHROCYTE [DISTWIDTH] IN BLOOD BY AUTOMATED COUNT: 17.5 % (ref 11.5–14.5)
GLOBULIN SER CALC-MCNC: 3.9 G/DL (ref 2–4)
GLUCOSE SERPL-MCNC: 286 MG/DL (ref 65–100)
HCT VFR BLD AUTO: 39.2 % (ref 35–47)
HGB BLD-MCNC: 12.6 G/DL (ref 11.5–16)
IMM GRANULOCYTES # BLD AUTO: 0.1 K/UL (ref 0–0.04)
IMM GRANULOCYTES NFR BLD AUTO: 1 % (ref 0–0.5)
LYMPHOCYTES # BLD: 0.9 K/UL (ref 0.8–3.5)
LYMPHOCYTES NFR BLD: 7 % (ref 12–49)
MCH RBC QN AUTO: 31.3 PG (ref 26–34)
MCHC RBC AUTO-ENTMCNC: 32.1 G/DL (ref 30–36.5)
MCV RBC AUTO: 97.5 FL (ref 80–99)
MONOCYTES # BLD: 0.3 K/UL (ref 0–1)
MONOCYTES NFR BLD: 2 % (ref 5–13)
NEUTS SEG # BLD: 11.7 K/UL (ref 1.8–8)
NEUTS SEG NFR BLD: 90 % (ref 32–75)
NRBC # BLD: 0 K/UL (ref 0–0.01)
NRBC BLD-RTO: 0 PER 100 WBC
PLATELET # BLD AUTO: 234 K/UL (ref 150–400)
PMV BLD AUTO: 8.7 FL (ref 8.9–12.9)
POTASSIUM SERPL-SCNC: 3.2 MMOL/L (ref 3.5–5.1)
PROT SERPL-MCNC: 7.4 G/DL (ref 6.4–8.2)
RBC # BLD AUTO: 4.02 M/UL (ref 3.8–5.2)
RBC MORPH BLD: ABNORMAL
SODIUM SERPL-SCNC: 140 MMOL/L (ref 136–145)
WBC # BLD AUTO: 13 K/UL (ref 3.6–11)

## 2023-10-17 PROCEDURE — 6370000000 HC RX 637 (ALT 250 FOR IP): Performed by: NURSE PRACTITIONER

## 2023-10-17 PROCEDURE — 80053 COMPREHEN METABOLIC PANEL: CPT

## 2023-10-17 PROCEDURE — 99215 OFFICE O/P EST HI 40 MIN: CPT | Performed by: INTERNAL MEDICINE

## 2023-10-17 PROCEDURE — 85025 COMPLETE CBC W/AUTO DIFF WBC: CPT

## 2023-10-17 PROCEDURE — 36591 DRAW BLOOD OFF VENOUS DEVICE: CPT

## 2023-10-17 PROCEDURE — 2580000003 HC RX 258: Performed by: INTERNAL MEDICINE

## 2023-10-17 RX ORDER — SODIUM CHLORIDE 0.9 % (FLUSH) 0.9 %
5-40 SYRINGE (ML) INJECTION PRN
Status: DISCONTINUED | OUTPATIENT
Start: 2023-10-17 | End: 2023-10-18 | Stop reason: HOSPADM

## 2023-10-17 RX ORDER — POTASSIUM CHLORIDE 750 MG/1
40 TABLET, EXTENDED RELEASE ORAL ONCE
Status: COMPLETED | OUTPATIENT
Start: 2023-10-17 | End: 2023-10-17

## 2023-10-17 RX ORDER — ZOLEDRONIC ACID 0.04 MG/ML
4 INJECTION, SOLUTION INTRAVENOUS ONCE
OUTPATIENT
Start: 2023-10-17 | End: 2023-10-17

## 2023-10-17 RX ORDER — SODIUM CHLORIDE 9 MG/ML
5-250 INJECTION, SOLUTION INTRAVENOUS PRN
OUTPATIENT
Start: 2023-10-17

## 2023-10-17 RX ORDER — CELECOXIB 200 MG/1
200 CAPSULE ORAL DAILY
Qty: 60 CAPSULE | Refills: 3 | Status: SHIPPED | OUTPATIENT
Start: 2023-10-17

## 2023-10-17 RX ADMIN — Medication 10 ML: at 11:40

## 2023-10-17 RX ADMIN — Medication 20 ML: at 14:28

## 2023-10-17 RX ADMIN — POTASSIUM CHLORIDE 40 MEQ: 750 TABLET, EXTENDED RELEASE ORAL at 14:25

## 2023-10-17 ASSESSMENT — PAIN SCALES - GENERAL: PAINLEVEL_OUTOF10: 0

## 2023-10-17 NOTE — PROGRESS NOTES
Suzan Eisenberg is a 61 y.o. female here today to follow up for breast cancer    1. Have you been to the ER, urgent care clinic since your last visit? Hospitalized since your last visit? no    2. Have you seen or consulted any other health care providers outside of the [address] [place] [address] since your last visit? Include any pap smears or colon screening.   no
reports recent viral symptoms, infiltrates worsened on recent imaging concerning for ILD. Given z-pack and predisone taper, Taking 20 mg prednisone daily now, will decrease to 10mg daily on 10/21/23. Cough resolved. Repeat high res CT 10/12/23. Concern for ILD. Referring to pulm, see above    7. Leukocytosis: most likely due to prednisone, will monitor. I personally saw and evaluated the patient and performed the entire medical decision making. The history, physical exam, and documentation were performed by Doni Melton NP. I reviewed and verified the above documentation and modified it as needed. Metastatic HER 2 + breast cancer, on T-Dxd. High res CT reviewed, I have concern for gr 2 ILD. Holding T-Dxd. Will refer to pulmonary. Decreasing dex to 10 mg daily. DPD testing today in prep for potential [id number]. Zometa due 11/26/23. Rtc 2 weeks with family to discuss next steps    I appreciate the opportunity to participate in Ms. Jamie Moser's care. Signed By: Dinah Hodgkins, MD      Return in about 2 weeks (around 10/31/2023) for labs, nadege/claire, Enhertu.

## 2023-10-19 ENCOUNTER — TELEPHONE (OUTPATIENT)
Age: 63
End: 2023-10-19

## 2023-10-19 NOTE — TELEPHONE ENCOUNTER
Called to speak with patients child, Fransisco Marin, regarding the information below. Andre Mott, patients son, answered with patient along side of him to discuss the information below.          [facility]  October 30th @ 2:30pm with a 2pm arrival  Dr. Brianna Matias    [address] [place] [address], [address] [place] [address]  [phone]

## 2023-10-31 ENCOUNTER — OFFICE VISIT (OUTPATIENT)
Age: 63
End: 2023-10-31

## 2023-10-31 ENCOUNTER — CLINICAL DOCUMENTATION (OUTPATIENT)
Age: 63
End: 2023-10-31

## 2023-10-31 ENCOUNTER — HOSPITAL ENCOUNTER (OUTPATIENT)
Facility: HOSPITAL | Age: 63
Setting detail: INFUSION SERIES
Discharge: HOME | End: 2023-10-31

## 2023-10-31 ENCOUNTER — ENROLLMENT (OUTPATIENT)
Age: 63
End: 2023-10-31

## 2023-10-31 VITALS
SYSTOLIC BLOOD PRESSURE: 122 MMHG | OXYGEN SATURATION: 96 % | HEART RATE: 85 BPM | TEMPERATURE: 98 F | DIASTOLIC BLOOD PRESSURE: 73 MMHG

## 2023-10-31 VITALS
WEIGHT: 143 LBS | HEART RATE: 85 BPM | SYSTOLIC BLOOD PRESSURE: 122 MMHG | DIASTOLIC BLOOD PRESSURE: 73 MMHG | OXYGEN SATURATION: 97 % | TEMPERATURE: 98 F | BODY MASS INDEX: 26.16 KG/M2 | RESPIRATION RATE: 16 BRPM

## 2023-10-31 DIAGNOSIS — Z17.1 MALIGNANT NEOPLASM OF OVERLAPPING SITES OF RIGHT BREAST IN FEMALE, ESTROGEN RECEPTOR NEGATIVE (HCC): Primary | ICD-10-CM

## 2023-10-31 DIAGNOSIS — C50.919 CARCINOMA OF BREAST METASTATIC TO BONE, UNSPECIFIED LATERALITY (HCC): ICD-10-CM

## 2023-10-31 DIAGNOSIS — C50.811 MALIGNANT NEOPLASM OF OVERLAPPING SITES OF RIGHT BREAST IN FEMALE, ESTROGEN RECEPTOR NEGATIVE (HCC): Primary | ICD-10-CM

## 2023-10-31 DIAGNOSIS — C79.51 CARCINOMA OF BREAST METASTATIC TO BONE, UNSPECIFIED LATERALITY (HCC): ICD-10-CM

## 2023-10-31 LAB
ALBUMIN SERPL-MCNC: 3.6 G/DL (ref 3.5–5)
ALBUMIN/GLOB SERPL: 0.8 (ref 1.1–2.2)
ALP SERPL-CCNC: 95 U/L (ref 45–117)
ALT SERPL-CCNC: 22 U/L (ref 12–78)
ANION GAP SERPL CALC-SCNC: 5 MMOL/L (ref 5–15)
AST SERPL-CCNC: 47 U/L (ref 15–37)
BASOPHILS # BLD: 0 K/UL (ref 0–0.1)
BASOPHILS NFR BLD: 1 % (ref 0–1)
BILIRUB SERPL-MCNC: 0.5 MG/DL (ref 0.2–1)
BUN SERPL-MCNC: 11 MG/DL (ref 6–20)
BUN/CREAT SERPL: 12 (ref 12–20)
CALCIUM SERPL-MCNC: 9.4 MG/DL (ref 8.5–10.1)
CHLORIDE SERPL-SCNC: 110 MMOL/L (ref 97–108)
CO2 SERPL-SCNC: 28 MMOL/L (ref 21–32)
CREAT SERPL-MCNC: 0.9 MG/DL (ref 0.55–1.02)
DIFFERENTIAL METHOD BLD: ABNORMAL
EOSINOPHIL # BLD: 0 K/UL (ref 0–0.4)
EOSINOPHIL NFR BLD: 0 % (ref 0–7)
ERYTHROCYTE [DISTWIDTH] IN BLOOD BY AUTOMATED COUNT: 15.9 % (ref 11.5–14.5)
GLOBULIN SER CALC-MCNC: 4.4 G/DL (ref 2–4)
GLUCOSE SERPL-MCNC: 159 MG/DL (ref 65–100)
HCT VFR BLD AUTO: 41.7 % (ref 35–47)
HGB BLD-MCNC: 13.1 G/DL (ref 11.5–16)
IMM GRANULOCYTES # BLD AUTO: 0 K/UL (ref 0–0.04)
IMM GRANULOCYTES NFR BLD AUTO: 1 % (ref 0–0.5)
LYMPHOCYTES # BLD: 0.9 K/UL (ref 0.8–3.5)
LYMPHOCYTES NFR BLD: 20 % (ref 12–49)
MCH RBC QN AUTO: 30.7 PG (ref 26–34)
MCHC RBC AUTO-ENTMCNC: 31.4 G/DL (ref 30–36.5)
MCV RBC AUTO: 97.7 FL (ref 80–99)
MONOCYTES # BLD: 0.2 K/UL (ref 0–1)
MONOCYTES NFR BLD: 5 % (ref 5–13)
NEUTS SEG # BLD: 3.2 K/UL (ref 1.8–8)
NEUTS SEG NFR BLD: 73 % (ref 32–75)
NRBC # BLD: 0 K/UL (ref 0–0.01)
NRBC BLD-RTO: 0 PER 100 WBC
PLATELET # BLD AUTO: 275 K/UL (ref 150–400)
PMV BLD AUTO: 8.6 FL (ref 8.9–12.9)
POTASSIUM SERPL-SCNC: 3.8 MMOL/L (ref 3.5–5.1)
PROT SERPL-MCNC: 8 G/DL (ref 6.4–8.2)
RBC # BLD AUTO: 4.27 M/UL (ref 3.8–5.2)
SODIUM SERPL-SCNC: 143 MMOL/L (ref 136–145)
WBC # BLD AUTO: 4.3 K/UL (ref 3.6–11)

## 2023-10-31 PROCEDURE — 99215 OFFICE O/P EST HI 40 MIN: CPT | Performed by: INTERNAL MEDICINE

## 2023-10-31 PROCEDURE — 36593 DECLOT VASCULAR DEVICE: CPT

## 2023-10-31 PROCEDURE — 36415 COLL VENOUS BLD VENIPUNCTURE: CPT

## 2023-10-31 PROCEDURE — 80053 COMPREHEN METABOLIC PANEL: CPT

## 2023-10-31 PROCEDURE — 6360000002 HC RX W HCPCS: Performed by: NURSE PRACTITIONER

## 2023-10-31 PROCEDURE — 85025 COMPLETE CBC W/AUTO DIFF WBC: CPT

## 2023-10-31 RX ORDER — UREA 200 MG/G
GEL TOPICAL
Qty: 75 G | Refills: 3 | Status: SHIPPED | OUTPATIENT
Start: 2023-10-31

## 2023-10-31 RX ORDER — CAPECITABINE 500 MG/1
1000 TABLET, FILM COATED ORAL 2 TIMES DAILY
Qty: 84 TABLET | Refills: 4 | Status: SHIPPED | OUTPATIENT
Start: 2023-10-31 | End: 2024-01-09

## 2023-10-31 RX ORDER — CAPECITABINE 500 MG/1
1000 TABLET, FILM COATED ORAL 2 TIMES DAILY
Qty: 84 TABLET | Refills: 4 | Status: SHIPPED | OUTPATIENT
Start: 2023-10-31 | End: 2023-10-31

## 2023-10-31 RX ADMIN — ALTEPLASE 2 MG: 2.2 INJECTION, POWDER, LYOPHILIZED, FOR SOLUTION INTRAVENOUS at 12:11

## 2023-10-31 ASSESSMENT — PAIN SCALES - GENERAL: PAINLEVEL_OUTOF10: 0

## 2023-10-31 NOTE — PROGRESS NOTES
[facility] [place] at [address], [address] [place] [address]  [place]: [phone]  F: [phone]      Reason for Visit:   Trinity Monae is a 61 y.o. female who has transferred to me from [place]. Treatment History:   2016:  Original right stage 3 breast cancer, s/p mastectomy with 5 cm IDC, 3/19 LN, ER negative, [place] negative, HER 2 +, treated with chemotherapy (unknown) and XRT (in [place])  9/2018:  recurrent breast cancer to her neck, tx with docetaxel q 3 weeks x 9, completed 4/12/2019 (PD) Sheryl Orellana)  6/25/19 L ax LN FNA:  poorly differentiated carcinoma, c/w breast cancer, ER negative, [place] negative, HER 2 positive at [facility] 3+  Tempus testing:  HER 2 overexpression  7/26/19 Trastuzumab/pertuzumab/paclitaxel until 7/2021 after 32 cycles (PD) [place], [place])  XRT to right neck 1/2021  T-DM1 8/10/21- T-Dxd  T-Dxd 7/2022- 9/26/23   Hold 9/26/23 due to gr 1 ILD - discontinued due to continued concern for chronic ILD on CT  Right shoulder dislocation and infection 11/17/22  Zometa 9/5/2023 - current    History of Present Illness:   Recently moved to [address] [place] [address] to live with daughter. Interval Hx: Patient presents today for follow up. Reports no symptoms today. Entire visit [facility] done [facility] Racquel [facility]    No past medical history on file. No past surgical history on file. Social History     Tobacco Use    Smoking status: Never    Smokeless tobacco: Never   Substance Use Topics    Alcohol use: Not on file      No family history on file.   Current Outpatient Medications   Medication Sig    celecoxib (CELEBREX) 200 MG capsule Take 1 capsule by mouth daily    amLODIPine (NORVASC) 5 MG tablet 1 tab(s) orally once a day    atorvastatin (LIPITOR) 40 MG tablet TAKE 1 TABLET BY MOUTH ONCE DAILY FOR 30 DAYS    ondansetron (ZOFRAN) 8 MG tablet Take 1 tablet by mouth every 8 hours as needed for Nausea or Vomiting    prochlorperazine (COMPAZINE) 10 MG tablet Take

## 2023-10-31 NOTE — PROGRESS NOTES
Sabina Lombard is a 61 y.o. female here today to follow up for breast cancer    1. Have you been to the ER, urgent care clinic since your last visit? Hospitalized since your last visit?no    2. Have you seen or consulted any other health care providers outside of the [address] [place] [address] Avenue since your last visit? Include any pap smears or colon screening.  no

## 2023-10-31 NOTE — PROGRESS NOTES
[facility] [place] at [address], [address] [place] [address] [address] [place] [address] [place]: [phone]  F: [phone]      Reason for Visit:   Berto Anguiano is a 61 y.o. female who has transferred to me from [place]. Treatment History:   2016:  Original right stage 3 breast cancer, s/p mastectomy with 5 cm IDC, 3/19 LN, ER negative, [place] negative, HER 2 +, treated with chemotherapy (unknown) and XRT (in [place])  2018:  recurrent breast cancer to her neck, tx with docetaxel q 3 weeks x 9, completed 2019 (PD) Wilma Atkins)  19 L ax LN FNA:  poorly differentiated carcinoma, c/w breast cancer, ER negative, [place] negative, HER 2 positive at [facility] 3+  Tempus testing:  HER 2 overexpression  19 Trastuzumab/pertuzumab/paclitaxel until 2021 after 32 cycles (PD) [place], [place])  XRT to right neck 2021  T-DM1 8/10/21- T-Dxd  T-Dxd 2022- 23   Hold 23 due to gr 1 ILD - discontinued due to continued concern for chronic ILD on CT  Right shoulder dislocation and infection 22  Zometa 2023 - current    History of Present Illness:   Recently moved to [address] [place] [address] to live with daughter. Interval Hx: Patient presents today for follow up. Reports no symptoms today. Entire visit was done with Melvi Nguyen from [facility]    No past medical history on file. No past surgical history on file. Social History     Tobacco Use    Smoking status: Never    Smokeless tobacco: Never   Substance Use Topics    Alcohol use: Not on file      No family history on file.   Current Outpatient Medications   Medication Sig    celecoxib (CELEBREX) 200 MG capsule Take 1 capsule by mouth daily    amLODIPine (NORVASC) 5 MG tablet 1 tab(s) orally once a day    atorvastatin (LIPITOR) 40 MG tablet TAKE 1 TABLET BY MOUTH ONCE DAILY FOR 30 DAYS    ondansetron (ZOFRAN) 8 MG tablet Take 1 tablet by mouth every 8 hours as needed for Nausea or Vomiting    prochlorperazine (COMPAZINE) 10 MG tablet Take

## 2023-10-31 NOTE — PROGRESS NOTES
Pharmacy Note- Chemotherapy Education    Gilbert Early is a  61 y. o.female  diagnosed with breast cancer here today for chemotherapy counseling. Ms. Dede Ortega is being treated with tucatinib, capecitabine and trastuzumab. Provided education on side effects and premedications. Side effects of chemotherapy reviewed with provider and we focused instead of overall timing and regimen of each medication as well as hand-foot syndrome as requested by provider. We reviewed instructions for urea cream as well. Medication name: capecitabine 500 mg tabs  Regimen: take three (3) tabs by mouth for a total dose of 1500 mg within 30 minutes after breakfast and dinner, approximately 12 hours apart, on days 1-14 and then 7 days off. Each cycle = 21 days. Medication name: tucatinib 150 mg tabs  Regimen: take two (2) tabs by mouth for a total dose of 300 mg in the morning and in the evening without regard to food. [employer] Handout of medications provided to patient. Ms. Dede Ortega verbalized understanding of the information presented and all of the patient's questions were answered. Chemotherapy/Immunotherapy education and consent discussed with the patient and the patient denied any questions or concerns. A hard copy of the chemotherapy consent was offered to the patient and the patient accepted.     Verena Pruitt, Eliezer, BCPS

## 2023-11-01 DIAGNOSIS — C50.919 CARCINOMA OF BREAST METASTATIC TO BONE, UNSPECIFIED LATERALITY (HCC): ICD-10-CM

## 2023-11-01 DIAGNOSIS — C79.51 CARCINOMA OF BREAST METASTATIC TO BONE, UNSPECIFIED LATERALITY (HCC): ICD-10-CM

## 2023-11-01 DIAGNOSIS — Z51.11 ENCOUNTER FOR ANTINEOPLASTIC CHEMOTHERAPY: ICD-10-CM

## 2023-11-02 ENCOUNTER — TELEPHONE (OUTPATIENT)
Age: 63
End: 2023-11-02

## 2023-11-02 NOTE — TELEPHONE ENCOUNTER
11/2/23- [facility] patient is uninsured and for the orders of Xeloda and Tukysa so free drug would be the next step for this patient and for the provider's office to assist.    Called patients sonPb. No answer, left a voicemail requesting a return call.     Patients son called back to this user. Pb requested the Tukysa form to be emailed to him so he can obtain the patients signature. Informed Pb for the Xeloda/Capecitabine prescription there is no longer free drug option available anymore but patient could sign up with [facility] and receive the medication for a discounted lockhart. Per Pb patient isn't able to pay a copay at all. Informed Pb this user will email the patient form for Tukysa as requested and will inform Dr. Underwood's team about the Capecitabine. Pb verbalized understanding and was appreciative of the call.     Called patients son Pb to verify if the form was received. Pb verified it was and that he will have the patient sign the form and then drop it by the .    Also informed Pb that the best price for the Capecitabine prescription that this user could find was still [facility] and offered to send the link to Pb via email. Pb agreed to this and stated he \"thinks\" they will be able to assist the patient with this copay for the Capecitabine. Emailed Pb the link to [facility].

## 2023-11-07 ENCOUNTER — APPOINTMENT (OUTPATIENT)
Facility: HOSPITAL | Age: 63
End: 2023-11-07
Payer: SUBSIDIZED

## 2023-11-09 DIAGNOSIS — Z51.11 ENCOUNTER FOR ANTINEOPLASTIC CHEMOTHERAPY: ICD-10-CM

## 2023-11-09 DIAGNOSIS — C50.919 MALIGNANT NEOPLASM OF BREAST IN FEMALE, ESTROGEN RECEPTOR NEGATIVE, UNSPECIFIED LATERALITY, UNSPECIFIED SITE OF BREAST (HCC): ICD-10-CM

## 2023-11-09 DIAGNOSIS — C50.919 CARCINOMA OF BREAST METASTATIC TO BONE, UNSPECIFIED LATERALITY (HCC): Primary | ICD-10-CM

## 2023-11-09 DIAGNOSIS — Z17.1 MALIGNANT NEOPLASM OF BREAST IN FEMALE, ESTROGEN RECEPTOR NEGATIVE, UNSPECIFIED LATERALITY, UNSPECIFIED SITE OF BREAST (HCC): ICD-10-CM

## 2023-11-09 DIAGNOSIS — C79.51 CARCINOMA OF BREAST METASTATIC TO BONE, UNSPECIFIED LATERALITY (HCC): Primary | ICD-10-CM

## 2023-11-09 RX ORDER — SODIUM CHLORIDE 9 MG/ML
5-250 INJECTION, SOLUTION INTRAVENOUS PRN
OUTPATIENT
Start: 2023-11-21

## 2023-11-09 RX ORDER — FAMOTIDINE 10 MG/ML
20 INJECTION, SOLUTION INTRAVENOUS
OUTPATIENT
Start: 2023-11-21

## 2023-11-09 RX ORDER — ACETAMINOPHEN 325 MG/1
650 TABLET ORAL
OUTPATIENT
Start: 2023-11-21

## 2023-11-09 RX ORDER — EPINEPHRINE 1 MG/ML
0.3 INJECTION, SOLUTION, CONCENTRATE INTRAVENOUS PRN
OUTPATIENT
Start: 2023-11-21

## 2023-11-09 RX ORDER — SODIUM CHLORIDE 0.9 % (FLUSH) 0.9 %
5-40 SYRINGE (ML) INJECTION PRN
OUTPATIENT
Start: 2023-11-21

## 2023-11-09 RX ORDER — DIPHENHYDRAMINE HYDROCHLORIDE 50 MG/ML
50 INJECTION INTRAMUSCULAR; INTRAVENOUS
OUTPATIENT
Start: 2023-11-21

## 2023-11-09 RX ORDER — ONDANSETRON 2 MG/ML
8 INJECTION INTRAMUSCULAR; INTRAVENOUS
OUTPATIENT
Start: 2023-11-21

## 2023-11-09 RX ORDER — MEPERIDINE HYDROCHLORIDE 50 MG/ML
12.5 INJECTION INTRAMUSCULAR; INTRAVENOUS; SUBCUTANEOUS PRN
OUTPATIENT
Start: 2023-11-21

## 2023-11-09 RX ORDER — ALBUTEROL SULFATE 90 UG/1
4 AEROSOL, METERED RESPIRATORY (INHALATION) PRN
OUTPATIENT
Start: 2023-11-21

## 2023-11-09 RX ORDER — SODIUM CHLORIDE 9 MG/ML
INJECTION, SOLUTION INTRAVENOUS CONTINUOUS
OUTPATIENT
Start: 2023-11-21

## 2023-11-09 RX ORDER — HEPARIN SODIUM (PORCINE) LOCK FLUSH IV SOLN 100 UNIT/ML 100 UNIT/ML
500 SOLUTION INTRAVENOUS PRN
OUTPATIENT
Start: 2023-11-21

## 2023-11-13 NOTE — TELEPHONE ENCOUNTER
11/13/23- Called [employer] to check status of patients application. Per representative, Anna patient is approved effective 11/8/23 for Tukysa and the approval ends on 11/7/24 with the cost of care $0 copay. That patient will receive an approval letter via mail and the office via fax. This user requested the approval letter to be refaxed because this wasn't received. Per Anna the order is sent to [facility] and the pharmacy will call patient to schedule shipment.     Called patients sonPb and no answer. Left a voicemail with the above information, requesting a return call with any questions and to discuss Capecitabine and if pt has set that up with [facility].

## 2023-11-13 NOTE — TELEPHONE ENCOUNTER
11/8/23- Faxed completed application to [employer] for assistance with Tukysa. Fax confirmation received.

## 2023-11-16 ENCOUNTER — HOSPITAL ENCOUNTER (OUTPATIENT)
Facility: HOSPITAL | Age: 63
Setting detail: OUTPATIENT SURGERY
Discharge: HOME | End: 2023-11-16
Attending: INTERNAL MEDICINE | Admitting: INTERNAL MEDICINE
Payer: SUBSIDIZED

## 2023-11-16 VITALS
HEART RATE: 78 BPM | DIASTOLIC BLOOD PRESSURE: 86 MMHG | BODY MASS INDEX: 26.37 KG/M2 | TEMPERATURE: 98 F | WEIGHT: 143.3 LBS | SYSTOLIC BLOOD PRESSURE: 158 MMHG | RESPIRATION RATE: 17 BRPM | OXYGEN SATURATION: 94 % | HEIGHT: 62 IN

## 2023-11-16 LAB
APPEARANCE FLD: ABNORMAL
COLOR FLD: COLORLESS
LYMPHOCYTES NFR FLD: 29 %
MONOS+MACROS NFR FLD: 22 %
NEUTROPHILS NFR FLD: 47 %
NUC CELL # FLD: 83 /CU MM
OTHER CELLS NFR FLD MANUAL: 2 %
RBC # FLD: >100 /CU MM
SPECIMEN SOURCE FLD: ABNORMAL

## 2023-11-16 PROCEDURE — 87070 CULTURE OTHR SPECIMN AEROBIC: CPT

## 2023-11-16 PROCEDURE — 87116 MYCOBACTERIA CULTURE: CPT

## 2023-11-16 PROCEDURE — 87102 FUNGUS ISOLATION CULTURE: CPT

## 2023-11-16 PROCEDURE — 87206 SMEAR FLUORESCENT/ACID STAI: CPT

## 2023-11-16 PROCEDURE — 7100000011 HC PHASE II RECOVERY - ADDTL 15 MIN: Performed by: INTERNAL MEDICINE

## 2023-11-16 PROCEDURE — 2709999900 HC NON-CHARGEABLE SUPPLY: Performed by: INTERNAL MEDICINE

## 2023-11-16 PROCEDURE — 87205 SMEAR GRAM STAIN: CPT

## 2023-11-16 PROCEDURE — 89050 BODY FLUID CELL COUNT: CPT

## 2023-11-16 PROCEDURE — 7100000010 HC PHASE II RECOVERY - FIRST 15 MIN: Performed by: INTERNAL MEDICINE

## 2023-11-16 PROCEDURE — 87252 VIRUS INOCULATION TISSUE: CPT

## 2023-11-16 PROCEDURE — 3600007502: Performed by: INTERNAL MEDICINE

## 2023-11-16 PROCEDURE — 99152 MOD SED SAME PHYS/QHP 5/>YRS: CPT | Performed by: INTERNAL MEDICINE

## 2023-11-16 PROCEDURE — 3600007512: Performed by: INTERNAL MEDICINE

## 2023-11-16 PROCEDURE — 88112 CYTOPATH CELL ENHANCE TECH: CPT

## 2023-11-16 PROCEDURE — 6370000000 HC RX 637 (ALT 250 FOR IP): Performed by: INTERNAL MEDICINE

## 2023-11-16 PROCEDURE — 6360000002 HC RX W HCPCS: Performed by: INTERNAL MEDICINE

## 2023-11-16 PROCEDURE — 2500000003 HC RX 250 WO HCPCS: Performed by: INTERNAL MEDICINE

## 2023-11-16 RX ORDER — ACETYLCYSTEINE 200 MG/ML
800 INJECTION INTRAVENOUS PRN
Status: DISCONTINUED | OUTPATIENT
Start: 2023-11-16 | End: 2023-11-16 | Stop reason: HOSPADM

## 2023-11-16 RX ORDER — NALOXONE HYDROCHLORIDE 0.4 MG/ML
INJECTION, SOLUTION INTRAMUSCULAR; INTRAVENOUS; SUBCUTANEOUS
Status: DISCONTINUED
Start: 2023-11-16 | End: 2023-11-16 | Stop reason: WASHOUT

## 2023-11-16 RX ORDER — ACETYLCYSTEINE 200 MG/ML
600 SOLUTION ORAL; RESPIRATORY (INHALATION) ONCE
Status: DISCONTINUED | OUTPATIENT
Start: 2023-11-16 | End: 2023-11-16 | Stop reason: HOSPADM

## 2023-11-16 RX ORDER — LIDOCAINE HYDROCHLORIDE 20 MG/ML
INJECTION, SOLUTION INFILTRATION; PERINEURAL
Status: DISCONTINUED
Start: 2023-11-16 | End: 2023-11-16 | Stop reason: HOSPADM

## 2023-11-16 RX ORDER — FLUMAZENIL 0.1 MG/ML
INJECTION INTRAVENOUS
Status: DISCONTINUED
Start: 2023-11-16 | End: 2023-11-16 | Stop reason: WASHOUT

## 2023-11-16 RX ORDER — LIDOCAINE HYDROCHLORIDE 20 MG/ML
2 INJECTION, SOLUTION INFILTRATION; PERINEURAL ONCE
Status: COMPLETED | OUTPATIENT
Start: 2023-11-16 | End: 2023-11-16

## 2023-11-16 RX ORDER — MIDAZOLAM HYDROCHLORIDE 1 MG/ML
INJECTION INTRAMUSCULAR; INTRAVENOUS
Status: DISCONTINUED
Start: 2023-11-16 | End: 2023-11-16 | Stop reason: HOSPADM

## 2023-11-16 RX ORDER — FENTANYL CITRATE 50 UG/ML
INJECTION, SOLUTION INTRAMUSCULAR; INTRAVENOUS
Status: DISCONTINUED
Start: 2023-11-16 | End: 2023-11-16 | Stop reason: HOSPADM

## 2023-11-16 RX ORDER — SODIUM CHLORIDE 9 MG/ML
INJECTION, SOLUTION INTRAVENOUS CONTINUOUS
Status: DISCONTINUED | OUTPATIENT
Start: 2023-11-16 | End: 2023-11-16 | Stop reason: HOSPADM

## 2023-11-16 RX ORDER — LIDOCAINE HYDROCHLORIDE AND EPINEPHRINE BITARTRATE 20; .01 MG/ML; MG/ML
2 INJECTION, SOLUTION SUBCUTANEOUS PRN
Status: DISCONTINUED | OUTPATIENT
Start: 2023-11-16 | End: 2023-11-16 | Stop reason: HOSPADM

## 2023-11-16 RX ORDER — FLUMAZENIL 0.1 MG/ML
0.2 INJECTION INTRAVENOUS PRN
Status: DISCONTINUED | OUTPATIENT
Start: 2023-11-16 | End: 2023-11-16 | Stop reason: HOSPADM

## 2023-11-16 RX ORDER — MIDAZOLAM HYDROCHLORIDE 1 MG/ML
5 INJECTION, SOLUTION INTRAMUSCULAR; INTRAVENOUS PRN
Status: DISCONTINUED | OUTPATIENT
Start: 2023-11-16 | End: 2023-11-16 | Stop reason: HOSPADM

## 2023-11-16 RX ORDER — NALOXONE HYDROCHLORIDE 0.4 MG/ML
0.2 INJECTION, SOLUTION INTRAMUSCULAR; INTRAVENOUS; SUBCUTANEOUS PRN
Status: DISCONTINUED | OUTPATIENT
Start: 2023-11-16 | End: 2023-11-16 | Stop reason: HOSPADM

## 2023-11-16 RX ORDER — LIDOCAINE HYDROCHLORIDE 40 MG/ML
SOLUTION TOPICAL
Status: DISCONTINUED
Start: 2023-11-16 | End: 2023-11-16 | Stop reason: HOSPADM

## 2023-11-16 RX ORDER — LIDOCAINE HYDROCHLORIDE 40 MG/ML
SOLUTION TOPICAL ONCE
Status: COMPLETED | OUTPATIENT
Start: 2023-11-16 | End: 2023-11-16

## 2023-11-16 RX ORDER — FENTANYL CITRATE 50 UG/ML
25 INJECTION, SOLUTION INTRAMUSCULAR; INTRAVENOUS PRN
Status: DISCONTINUED | OUTPATIENT
Start: 2023-11-16 | End: 2023-11-16 | Stop reason: HOSPADM

## 2023-11-16 RX ADMIN — LIDOCAINE HYDROCHLORIDE 12 ML: 20 INJECTION, SOLUTION INFILTRATION; PERINEURAL at 09:45

## 2023-11-16 RX ADMIN — MIDAZOLAM 1 MG: 1 INJECTION INTRAMUSCULAR; INTRAVENOUS at 09:35

## 2023-11-16 RX ADMIN — FENTANYL CITRATE 25 MCG: 50 INJECTION, SOLUTION INTRAMUSCULAR; INTRAVENOUS at 09:35

## 2023-11-16 RX ADMIN — LIDOCAINE HYDROCHLORIDE 5 ML: 40 SOLUTION TOPICAL at 08:46

## 2023-11-16 RX ADMIN — FENTANYL CITRATE 25 MCG: 50 INJECTION, SOLUTION INTRAMUSCULAR; INTRAVENOUS at 09:39

## 2023-11-16 ASSESSMENT — PAIN - FUNCTIONAL ASSESSMENT
PAIN_FUNCTIONAL_ASSESSMENT: 0-10
PAIN_FUNCTIONAL_ASSESSMENT: NONE - DENIES PAIN

## 2023-11-16 NOTE — PROGRESS NOTES
Endoscopy discharge instructions have been reviewed and given to patient. The patient verbalized understanding and acceptance of instructions. Dr. Aleta Carvajal discussed with patient procedure findings and next steps.

## 2023-11-16 NOTE — PROGRESS NOTES
5815  Timeout performed. 4301  Endoscope was pre-cleaned at bedside immediately following procedure by endo Karla corona. 5309  Patient tolerated procedure. Abdomen soft and patient arousable and voices no complaints. Patient transported to endoscopy recovery area. Report given to post procedure RN,   Joel Cartagena.

## 2023-11-16 NOTE — PROGRESS NOTES
Gayatri [place]  1960  [id number]    Situation:  Verbal report received from: Susanne Love RN   Procedure: Procedure(s):  BRONCHOSCOPY    Background:    Preoperative diagnosis: Abnormal chest CT [R93.89]  Postoperative diagnosis: * No post-op diagnosis entered *    :  Dr. Eden Nesbitt  Assistant(s): Circulator: Vinny Leblanc RN    Specimens: * No specimens in log *  H. Pylori  No    Assessment:  Intra-procedure medications   Versed 2 Yes2 mg  Fentanyl **Yes*100 mcg    Anesthesia gave intra-procedure sedation and medications, see anesthesia flow sheet No    Intravenous fluids: NS@ KVO     Vital signs stable     Abdominal assessment: round and soft     Recommendation:  Discharge patient per MD order.   Return to floor  Family or Friend   Permission to share finding with family or friend Yes

## 2023-11-17 LAB
ACID FAST STN SPEC: NEGATIVE
MYCOBACTERIUM SPEC QL CULT: NORMAL
SPECIMEN PREPARATION: NORMAL
SPECIMEN SOURCE: NORMAL
SPECIMEN SOURCE: NORMAL
VIRUS SPEC CULT: NORMAL

## 2023-11-18 LAB
BACTERIA SPEC CULT: NORMAL
GRAM STN SPEC: NORMAL
SERVICE CMNT-IMP: NORMAL

## 2023-11-19 NOTE — PROCEDURES
[address] [place] [address] John Srinivasan 101 200  [place], [address]  [phone]    [facility]  1960  [id number]      Date of Procedure: 11/16/23    Preoperative diagnosis: Abnormal chest CT [R93.89]    Procedure: Procedure(s) with comments:  BRONCHOSCOPY - Washing  INJECTION MEDICATION  BRONCHOSCOPY ALVEOLAR LAVAGE    Indication: Lung infiltrates    :  Harshad Lowry MD    Assistant(s): Circulator: Katy Huang RN  Endoscopy Technician: aGbi Christianson    Anesthesia/Sedation:  Versed 1 mg IV and Fentanyl 100 mcg IV      Procedure Details:  After infomed consent was obtained for the procedure, with all risks and benefits of procedure explained the patient was taken to the endoscopy suite and placed in the supine position. Following sequential administration of sedation as per above, the bronchoscope was inserted into the mouth and advanced under direct vision to the tracheobronchial tree. Findings:   Normal looking vocal cords. Normal appearing trachea. Scant white sputum bilaterally. RML BAL- 30 cc inserted- 5 cc return of clear fluid. RUL 30 cc inserted- 15 cc return of pink fluid. Therapies:   See above    Specimens:   See above            Complications:   None noted; patient tolerated the procedure well. EBL:  Minimal           Impression:  Scant sputum    Recommendations: Follow up fluid culture and cytology.        Kathleen Palumbo MD  11/19/2023  11:03 AM

## 2023-11-20 LAB
ACID FAST STN SPEC: NEGATIVE
BACTERIA SPEC CULT: NORMAL
MYCOBACTERIUM SPEC QL CULT: NORMAL
SERVICE CMNT-IMP: NORMAL
SPECIMEN PREPARATION: NORMAL
SPECIMEN SOURCE: NORMAL

## 2023-11-21 ENCOUNTER — OFFICE VISIT (OUTPATIENT)
Age: 63
End: 2023-11-21

## 2023-11-21 ENCOUNTER — HOSPITAL ENCOUNTER (OUTPATIENT)
Facility: HOSPITAL | Age: 63
Setting detail: INFUSION SERIES
Discharge: HOME | End: 2023-11-21

## 2023-11-21 VITALS
SYSTOLIC BLOOD PRESSURE: 126 MMHG | BODY MASS INDEX: 26.37 KG/M2 | HEART RATE: 84 BPM | DIASTOLIC BLOOD PRESSURE: 72 MMHG | OXYGEN SATURATION: 96 % | WEIGHT: 143.3 LBS | RESPIRATION RATE: 18 BRPM | HEIGHT: 62 IN | TEMPERATURE: 98.1 F

## 2023-11-21 VITALS
WEIGHT: 143 LBS | SYSTOLIC BLOOD PRESSURE: 107 MMHG | TEMPERATURE: 98.2 F | RESPIRATION RATE: 16 BRPM | BODY MASS INDEX: 26.16 KG/M2 | HEART RATE: 72 BPM | OXYGEN SATURATION: 97 % | DIASTOLIC BLOOD PRESSURE: 71 MMHG

## 2023-11-21 DIAGNOSIS — Z17.1 MALIGNANT NEOPLASM OF BREAST IN FEMALE, ESTROGEN RECEPTOR NEGATIVE, UNSPECIFIED LATERALITY, UNSPECIFIED SITE OF BREAST (HCC): ICD-10-CM

## 2023-11-21 DIAGNOSIS — C79.51 CARCINOMA OF BREAST METASTATIC TO BONE, UNSPECIFIED LATERALITY (HCC): ICD-10-CM

## 2023-11-21 DIAGNOSIS — I10 HYPERTENSION, UNSPECIFIED TYPE: ICD-10-CM

## 2023-11-21 DIAGNOSIS — Z51.11 ENCOUNTER FOR ANTINEOPLASTIC CHEMOTHERAPY: Primary | ICD-10-CM

## 2023-11-21 DIAGNOSIS — Z17.1 MALIGNANT NEOPLASM OF OVERLAPPING SITES OF RIGHT BREAST IN FEMALE, ESTROGEN RECEPTOR NEGATIVE (HCC): Primary | ICD-10-CM

## 2023-11-21 DIAGNOSIS — C50.919 CARCINOMA OF BREAST METASTATIC TO BONE, UNSPECIFIED LATERALITY (HCC): ICD-10-CM

## 2023-11-21 DIAGNOSIS — C50.811 MALIGNANT NEOPLASM OF OVERLAPPING SITES OF RIGHT BREAST IN FEMALE, ESTROGEN RECEPTOR NEGATIVE (HCC): Primary | ICD-10-CM

## 2023-11-21 DIAGNOSIS — C50.919 MALIGNANT NEOPLASM OF BREAST IN FEMALE, ESTROGEN RECEPTOR NEGATIVE, UNSPECIFIED LATERALITY, UNSPECIFIED SITE OF BREAST (HCC): ICD-10-CM

## 2023-11-21 LAB
ALBUMIN SERPL-MCNC: 3.5 G/DL (ref 3.5–5)
ALBUMIN/GLOB SERPL: 0.8 (ref 1.1–2.2)
ALP SERPL-CCNC: 82 U/L (ref 45–117)
ALT SERPL-CCNC: 19 U/L (ref 12–78)
ANION GAP SERPL CALC-SCNC: 2 MMOL/L (ref 5–15)
AST SERPL-CCNC: 37 U/L (ref 15–37)
BASOPHILS # BLD: 0.1 K/UL (ref 0–0.1)
BASOPHILS NFR BLD: 1 % (ref 0–1)
BILIRUB SERPL-MCNC: 0.6 MG/DL (ref 0.2–1)
BUN SERPL-MCNC: 13 MG/DL (ref 6–20)
BUN/CREAT SERPL: 13 (ref 12–20)
CALCIUM SERPL-MCNC: 8.7 MG/DL (ref 8.5–10.1)
CHLORIDE SERPL-SCNC: 107 MMOL/L (ref 97–108)
CO2 SERPL-SCNC: 30 MMOL/L (ref 21–32)
CREAT SERPL-MCNC: 1.01 MG/DL (ref 0.55–1.02)
DIFFERENTIAL METHOD BLD: ABNORMAL
EOSINOPHIL # BLD: 0.2 K/UL (ref 0–0.4)
EOSINOPHIL NFR BLD: 3 % (ref 0–7)
ERYTHROCYTE [DISTWIDTH] IN BLOOD BY AUTOMATED COUNT: 14.6 % (ref 11.5–14.5)
GLOBULIN SER CALC-MCNC: 4.4 G/DL (ref 2–4)
GLUCOSE SERPL-MCNC: 149 MG/DL (ref 65–100)
HCT VFR BLD AUTO: 39.3 % (ref 35–47)
HGB BLD-MCNC: 12.8 G/DL (ref 11.5–16)
IMM GRANULOCYTES # BLD AUTO: 0 K/UL (ref 0–0.04)
IMM GRANULOCYTES NFR BLD AUTO: 1 % (ref 0–0.5)
LYMPHOCYTES # BLD: 1.1 K/UL (ref 0.8–3.5)
LYMPHOCYTES NFR BLD: 19 % (ref 12–49)
MCH RBC QN AUTO: 30.7 PG (ref 26–34)
MCHC RBC AUTO-ENTMCNC: 32.6 G/DL (ref 30–36.5)
MCV RBC AUTO: 94.2 FL (ref 80–99)
MONOCYTES # BLD: 0.4 K/UL (ref 0–1)
MONOCYTES NFR BLD: 8 % (ref 5–13)
NEUTS SEG # BLD: 3.9 K/UL (ref 1.8–8)
NEUTS SEG NFR BLD: 68 % (ref 32–75)
NRBC # BLD: 0 K/UL (ref 0–0.01)
NRBC BLD-RTO: 0 PER 100 WBC
PLATELET # BLD AUTO: 276 K/UL (ref 150–400)
PMV BLD AUTO: 8.6 FL (ref 8.9–12.9)
POTASSIUM SERPL-SCNC: 3.3 MMOL/L (ref 3.5–5.1)
PROT SERPL-MCNC: 7.9 G/DL (ref 6.4–8.2)
RBC # BLD AUTO: 4.17 M/UL (ref 3.8–5.2)
SODIUM SERPL-SCNC: 139 MMOL/L (ref 136–145)
WBC # BLD AUTO: 5.6 K/UL (ref 3.6–11)

## 2023-11-21 PROCEDURE — 2580000003 HC RX 258: Performed by: INTERNAL MEDICINE

## 2023-11-21 PROCEDURE — 85025 COMPLETE CBC W/AUTO DIFF WBC: CPT

## 2023-11-21 PROCEDURE — 96365 THER/PROPH/DIAG IV INF INIT: CPT

## 2023-11-21 PROCEDURE — 80053 COMPREHEN METABOLIC PANEL: CPT

## 2023-11-21 PROCEDURE — 96366 THER/PROPH/DIAG IV INF ADDON: CPT

## 2023-11-21 PROCEDURE — 96413 CHEMO IV INFUSION 1 HR: CPT

## 2023-11-21 PROCEDURE — 6360000002 HC RX W HCPCS: Performed by: INTERNAL MEDICINE

## 2023-11-21 PROCEDURE — 36415 COLL VENOUS BLD VENIPUNCTURE: CPT

## 2023-11-21 PROCEDURE — 99215 OFFICE O/P EST HI 40 MIN: CPT | Performed by: INTERNAL MEDICINE

## 2023-11-21 RX ORDER — SODIUM CHLORIDE 0.9 % (FLUSH) 0.9 %
5-40 SYRINGE (ML) INJECTION PRN
Status: DISCONTINUED | OUTPATIENT
Start: 2023-11-21 | End: 2023-11-22 | Stop reason: HOSPADM

## 2023-11-21 RX ORDER — PREDNISONE 20 MG/1
40 TABLET ORAL DAILY
Qty: 60 TABLET | Refills: 0 | Status: SHIPPED | OUTPATIENT
Start: 2023-11-21 | End: 2023-12-21

## 2023-11-21 RX ORDER — AMLODIPINE BESYLATE 5 MG/1
5 TABLET ORAL DAILY
Qty: 90 TABLET | Refills: 1 | Status: SHIPPED | OUTPATIENT
Start: 2023-11-21

## 2023-11-21 RX ORDER — SODIUM CHLORIDE 9 MG/ML
5-250 INJECTION, SOLUTION INTRAVENOUS PRN
Status: DISCONTINUED | OUTPATIENT
Start: 2023-11-21 | End: 2023-11-22 | Stop reason: HOSPADM

## 2023-11-21 RX ADMIN — SODIUM CHLORIDE, PRESERVATIVE FREE 20 ML: 5 INJECTION INTRAVENOUS at 17:37

## 2023-11-21 RX ADMIN — TRASTUZUMAB 525 MG: 150 INJECTION, POWDER, LYOPHILIZED, FOR SOLUTION INTRAVENOUS at 15:25

## 2023-11-21 RX ADMIN — SODIUM CHLORIDE 25 ML/HR: 9 INJECTION, SOLUTION INTRAVENOUS at 15:10

## 2023-11-21 ASSESSMENT — PAIN SCALES - GENERAL: PAINLEVEL_OUTOF10: 0

## 2023-11-21 NOTE — CONSULTS
Session ID: [id number]  Language: Bengali   ID: #[id number]   Name: Bo Mcleod
Session ID: [id number]  Language: Czech   ID: #[id number]   Name: Ashley Lane
Session ID: [id number]  Language: Malay   ID: #[id number]   Name: Michelle Giron
Alert-The patient is alert, awake and responds to voice. The patient is oriented to time, place, and person. The triage nurse is able to obtain subjective information.

## 2023-11-21 NOTE — PROGRESS NOTES
Ulises Bender is a 61 y.o. female here today to follow up for breast cancer    1. Have you been to the ER, urgent care clinic since your last visit? Hospitalized since your last visit? no    2. Have you seen or consulted any other health care providers outside of the [address] [place] [address] since your last visit? Include any pap smears or colon screening.  no
left 10th rib posteriorly is consistent with a fracture. Slight  activity left greater trochanter is most likely related to tendinopathy. A  definite pattern of bony metastatic disease is not identified    Records reviewed and summarized above. Pathology report(s) reviewed above. Radiology report(s) reviewed above. Assessment/plan:   1. Metastatic recurrent right breast cancer, ER negative, OR negative, HER 2 positive:      Mets to neck LN, hilar LN, left ax LN, liver, lung, bones    Discussed that while this is treatable, it is not curable, goal of therapy is palliative. repeated PET scan to compare apples to apples, and also get CT c/a/p and bone scan. PET 9/21/23, CT 9/26/23 and Bone scan 9/25/23 reviewed, no progression but some worsening pulmonary infiltrates. Treatment held, see # 6    Discussed side effects on T-Dxd including but not limited to alopecia, nausea, vomiting, fatigue, myelosuppression, heart damage, ILD, mouth sores, liver toxicity. She is agreeable to continue and has signed informed consent. She presents today for treatment, cough has resolved. Repeat high resolution CT chronic atypical infection/inflammation. Will hold T-Dxd for now. Discussed with Dr. Jerald Luna and bronch performed. Will change to [id number] with capecitabine, tucatinib, trastuzumab. Negative for DPD deficiency 10/17/23. [id number] (capecitabine 1000 mg/m2 on days 1-14 then 7 days off; trastuzumab 8 mg/kg load then 6 mg/kg q 3 weeks IV; tucatinib 300 mg bid). cape dose will be 1500 mg bid (three 500 mg pills bid)      Discussed side effects of T-Dxd including but not limited to fatigue, myelosuppression, nausea, vomiting, alopecia, ILD, heart damage. The side effects of trastuzumab were discussed including a 4%-5% risk of dropping her ejection fraction while on treatment and about a 1%  risk of CHF.         We discussed the risks and benefits of capecitabine chemotherapy, including potential side

## 2023-11-21 NOTE — PATIENT INSTRUCTIONS
Capecitabine- Tomese 3 tableta por via oral dos veces al shantal por 14 alejo. Despues descanse 7 alejo. Luego se vuelve a repetir todo el ciclo- tomando 3 tableta por via oral dos veces al shantal por 14 alejo. Despues descanse 7 alejo. Asi va seguir khushbu patron tomandose esta medicina por 2 semanas y despues descansa 7 alejo.     Prednisona - Tomese 2 tabletas juan c vez al shantal

## 2023-11-27 LAB
BACTERIA SPEC CULT: NORMAL
LEGIONELLA SPEC CULT: NORMAL
LEGIONELLA SPEC CULT: NORMAL
SERVICE CMNT-IMP: NORMAL
SPECIMEN SOURCE: NORMAL

## 2023-11-28 ENCOUNTER — APPOINTMENT (OUTPATIENT)
Facility: HOSPITAL | Age: 63
End: 2023-11-28

## 2023-12-04 LAB
BACTERIA SPEC CULT: NORMAL
SERVICE CMNT-IMP: NORMAL

## 2023-12-05 RX ORDER — HEPARIN 100 UNIT/ML
500 SYRINGE INTRAVENOUS PRN
Status: CANCELLED | OUTPATIENT
Start: 2023-12-12

## 2023-12-05 RX ORDER — ACETAMINOPHEN 325 MG/1
650 TABLET ORAL
Status: CANCELLED | OUTPATIENT
Start: 2023-12-12

## 2023-12-05 RX ORDER — DIPHENHYDRAMINE HYDROCHLORIDE 50 MG/ML
50 INJECTION INTRAMUSCULAR; INTRAVENOUS
Status: CANCELLED | OUTPATIENT
Start: 2023-12-12

## 2023-12-05 RX ORDER — SODIUM CHLORIDE 9 MG/ML
5-250 INJECTION, SOLUTION INTRAVENOUS PRN
Status: CANCELLED | OUTPATIENT
Start: 2023-12-12

## 2023-12-05 RX ORDER — MEPERIDINE HYDROCHLORIDE 25 MG/ML
12.5 INJECTION INTRAMUSCULAR; INTRAVENOUS; SUBCUTANEOUS PRN
Status: CANCELLED | OUTPATIENT
Start: 2023-12-12

## 2023-12-05 RX ORDER — ONDANSETRON 2 MG/ML
8 INJECTION INTRAMUSCULAR; INTRAVENOUS
Status: CANCELLED | OUTPATIENT
Start: 2023-12-12

## 2023-12-05 RX ORDER — ALBUTEROL SULFATE 90 UG/1
4 AEROSOL, METERED RESPIRATORY (INHALATION) PRN
Status: CANCELLED | OUTPATIENT
Start: 2023-12-12

## 2023-12-05 RX ORDER — EPINEPHRINE 1 MG/ML
0.3 INJECTION, SOLUTION INTRAMUSCULAR; SUBCUTANEOUS PRN
Status: CANCELLED | OUTPATIENT
Start: 2023-12-12

## 2023-12-05 RX ORDER — SODIUM CHLORIDE 9 MG/ML
INJECTION, SOLUTION INTRAVENOUS CONTINUOUS
Status: CANCELLED | OUTPATIENT
Start: 2023-12-12

## 2023-12-05 RX ORDER — FAMOTIDINE 10 MG/ML
20 INJECTION, SOLUTION INTRAVENOUS
Status: CANCELLED | OUTPATIENT
Start: 2023-12-12

## 2023-12-11 LAB
BACTERIA SPEC CULT: NORMAL
SERVICE CMNT-IMP: NORMAL

## 2023-12-12 ENCOUNTER — HOSPITAL ENCOUNTER (OUTPATIENT)
Facility: HOSPITAL | Age: 63
Setting detail: INFUSION SERIES
Discharge: HOME | End: 2023-12-12
Payer: SUBSIDIZED

## 2023-12-12 ENCOUNTER — OFFICE VISIT (OUTPATIENT)
Age: 63
End: 2023-12-12
Payer: SUBSIDIZED

## 2023-12-12 VITALS
SYSTOLIC BLOOD PRESSURE: 108 MMHG | OXYGEN SATURATION: 97 % | HEART RATE: 72 BPM | WEIGHT: 144 LBS | DIASTOLIC BLOOD PRESSURE: 64 MMHG | RESPIRATION RATE: 17 BRPM | TEMPERATURE: 98 F | BODY MASS INDEX: 26.33 KG/M2

## 2023-12-12 VITALS
WEIGHT: 144 LBS | OXYGEN SATURATION: 97 % | HEIGHT: 62 IN | TEMPERATURE: 98 F | RESPIRATION RATE: 18 BRPM | DIASTOLIC BLOOD PRESSURE: 71 MMHG | BODY MASS INDEX: 26.5 KG/M2 | HEART RATE: 72 BPM | SYSTOLIC BLOOD PRESSURE: 127 MMHG

## 2023-12-12 DIAGNOSIS — C79.51 CARCINOMA OF BREAST METASTATIC TO BONE, UNSPECIFIED LATERALITY (HCC): ICD-10-CM

## 2023-12-12 DIAGNOSIS — Z17.1 MALIGNANT NEOPLASM OF BREAST IN FEMALE, ESTROGEN RECEPTOR NEGATIVE, UNSPECIFIED LATERALITY, UNSPECIFIED SITE OF BREAST (HCC): ICD-10-CM

## 2023-12-12 DIAGNOSIS — C50.919 MALIGNANT NEOPLASM OF BREAST IN FEMALE, ESTROGEN RECEPTOR NEGATIVE, UNSPECIFIED LATERALITY, UNSPECIFIED SITE OF BREAST (HCC): ICD-10-CM

## 2023-12-12 DIAGNOSIS — Z51.11 ENCOUNTER FOR ANTINEOPLASTIC CHEMOTHERAPY: Primary | ICD-10-CM

## 2023-12-12 DIAGNOSIS — Z17.1 MALIGNANT NEOPLASM OF OVERLAPPING SITES OF RIGHT BREAST IN FEMALE, ESTROGEN RECEPTOR NEGATIVE (HCC): Primary | ICD-10-CM

## 2023-12-12 DIAGNOSIS — C50.919 CARCINOMA OF BREAST METASTATIC TO BONE, UNSPECIFIED LATERALITY (HCC): ICD-10-CM

## 2023-12-12 DIAGNOSIS — Z51.11 ENCOUNTER FOR ANTINEOPLASTIC CHEMOTHERAPY: ICD-10-CM

## 2023-12-12 DIAGNOSIS — C50.811 MALIGNANT NEOPLASM OF OVERLAPPING SITES OF RIGHT BREAST IN FEMALE, ESTROGEN RECEPTOR NEGATIVE (HCC): Primary | ICD-10-CM

## 2023-12-12 LAB
ALBUMIN SERPL-MCNC: 3.5 G/DL (ref 3.5–5)
ALBUMIN/GLOB SERPL: 1 (ref 1.1–2.2)
ALP SERPL-CCNC: 55 U/L (ref 45–117)
ALT SERPL-CCNC: 13 U/L (ref 12–78)
ANION GAP SERPL CALC-SCNC: 4 MMOL/L (ref 5–15)
AST SERPL-CCNC: 18 U/L (ref 15–37)
BASOPHILS # BLD: 0 K/UL (ref 0–0.1)
BASOPHILS NFR BLD: 0 % (ref 0–1)
BILIRUB SERPL-MCNC: 0.9 MG/DL (ref 0.2–1)
BUN SERPL-MCNC: 21 MG/DL (ref 6–20)
BUN/CREAT SERPL: 18 (ref 12–20)
CALCIUM SERPL-MCNC: 8.5 MG/DL (ref 8.5–10.1)
CHLORIDE SERPL-SCNC: 102 MMOL/L (ref 97–108)
CO2 SERPL-SCNC: 31 MMOL/L (ref 21–32)
CREAT SERPL-MCNC: 1.14 MG/DL (ref 0.55–1.02)
DIFFERENTIAL METHOD BLD: ABNORMAL
EOSINOPHIL # BLD: 0 K/UL (ref 0–0.4)
EOSINOPHIL NFR BLD: 0 % (ref 0–7)
ERYTHROCYTE [DISTWIDTH] IN BLOOD BY AUTOMATED COUNT: 17.1 % (ref 11.5–14.5)
GLOBULIN SER CALC-MCNC: 3.6 G/DL (ref 2–4)
GLUCOSE SERPL-MCNC: 175 MG/DL (ref 65–100)
HCT VFR BLD AUTO: 42.9 % (ref 35–47)
HGB BLD-MCNC: 13.9 G/DL (ref 11.5–16)
IMM GRANULOCYTES # BLD AUTO: 0.1 K/UL (ref 0–0.04)
IMM GRANULOCYTES NFR BLD AUTO: 1 % (ref 0–0.5)
LYMPHOCYTES # BLD: 0.8 K/UL (ref 0.8–3.5)
LYMPHOCYTES NFR BLD: 9 % (ref 12–49)
MCH RBC QN AUTO: 31.1 PG (ref 26–34)
MCHC RBC AUTO-ENTMCNC: 32.4 G/DL (ref 30–36.5)
MCV RBC AUTO: 96 FL (ref 80–99)
MONOCYTES # BLD: 0.3 K/UL (ref 0–1)
MONOCYTES NFR BLD: 3 % (ref 5–13)
NEUTS SEG # BLD: 7.5 K/UL (ref 1.8–8)
NEUTS SEG NFR BLD: 87 % (ref 32–75)
NRBC # BLD: 0 K/UL (ref 0–0.01)
NRBC BLD-RTO: 0 PER 100 WBC
PLATELET # BLD AUTO: 200 K/UL (ref 150–400)
PMV BLD AUTO: 8.5 FL (ref 8.9–12.9)
POTASSIUM SERPL-SCNC: 3.2 MMOL/L (ref 3.5–5.1)
PROT SERPL-MCNC: 7.1 G/DL (ref 6.4–8.2)
RBC # BLD AUTO: 4.47 M/UL (ref 3.8–5.2)
RBC MORPH BLD: ABNORMAL
SODIUM SERPL-SCNC: 137 MMOL/L (ref 136–145)
WBC # BLD AUTO: 8.7 K/UL (ref 3.6–11)

## 2023-12-12 PROCEDURE — 2580000003 HC RX 258: Performed by: NURSE PRACTITIONER

## 2023-12-12 PROCEDURE — 99215 OFFICE O/P EST HI 40 MIN: CPT | Performed by: INTERNAL MEDICINE

## 2023-12-12 PROCEDURE — 96413 CHEMO IV INFUSION 1 HR: CPT

## 2023-12-12 PROCEDURE — 80053 COMPREHEN METABOLIC PANEL: CPT

## 2023-12-12 PROCEDURE — 96367 TX/PROPH/DG ADDL SEQ IV INF: CPT

## 2023-12-12 PROCEDURE — 2580000003 HC RX 258: Performed by: INTERNAL MEDICINE

## 2023-12-12 PROCEDURE — 6360000002 HC RX W HCPCS: Performed by: NURSE PRACTITIONER

## 2023-12-12 PROCEDURE — 36415 COLL VENOUS BLD VENIPUNCTURE: CPT

## 2023-12-12 PROCEDURE — 85025 COMPLETE CBC W/AUTO DIFF WBC: CPT

## 2023-12-12 PROCEDURE — 6360000002 HC RX W HCPCS: Performed by: INTERNAL MEDICINE

## 2023-12-12 RX ORDER — ZOLEDRONIC ACID 0.04 MG/ML
INJECTION, SOLUTION INTRAVENOUS
Status: DISCONTINUED
Start: 2023-12-12 | End: 2023-12-12 | Stop reason: WASHOUT

## 2023-12-12 RX ORDER — SODIUM CHLORIDE 0.9 % (FLUSH) 0.9 %
5-40 SYRINGE (ML) INJECTION PRN
Status: DISCONTINUED | OUTPATIENT
Start: 2023-12-12 | End: 2023-12-13 | Stop reason: HOSPADM

## 2023-12-12 RX ORDER — ALBUTEROL SULFATE 90 UG/1
4 AEROSOL, METERED RESPIRATORY (INHALATION) PRN
OUTPATIENT
Start: 2024-02-20

## 2023-12-12 RX ORDER — ACETAMINOPHEN 325 MG/1
650 TABLET ORAL
OUTPATIENT
Start: 2024-02-20

## 2023-12-12 RX ORDER — SODIUM CHLORIDE 9 MG/ML
5-250 INJECTION, SOLUTION INTRAVENOUS PRN
OUTPATIENT
Start: 2024-02-20

## 2023-12-12 RX ORDER — SODIUM CHLORIDE 9 MG/ML
5-250 INJECTION, SOLUTION INTRAVENOUS PRN
Status: DISCONTINUED | OUTPATIENT
Start: 2023-12-12 | End: 2023-12-13 | Stop reason: HOSPADM

## 2023-12-12 RX ORDER — EPINEPHRINE 1 MG/ML
0.3 INJECTION, SOLUTION INTRAMUSCULAR; SUBCUTANEOUS PRN
OUTPATIENT
Start: 2024-02-20

## 2023-12-12 RX ORDER — DIPHENHYDRAMINE HYDROCHLORIDE 50 MG/ML
50 INJECTION INTRAMUSCULAR; INTRAVENOUS
OUTPATIENT
Start: 2024-02-20

## 2023-12-12 RX ORDER — SODIUM CHLORIDE 0.9 % (FLUSH) 0.9 %
5-40 SYRINGE (ML) INJECTION PRN
OUTPATIENT
Start: 2024-02-20

## 2023-12-12 RX ORDER — ONDANSETRON 2 MG/ML
8 INJECTION INTRAMUSCULAR; INTRAVENOUS
OUTPATIENT
Start: 2024-02-20

## 2023-12-12 RX ORDER — SODIUM CHLORIDE 9 MG/ML
INJECTION, SOLUTION INTRAVENOUS CONTINUOUS
OUTPATIENT
Start: 2024-02-20

## 2023-12-12 RX ORDER — ZOLEDRONIC ACID 0.04 MG/ML
4 INJECTION, SOLUTION INTRAVENOUS ONCE
OUTPATIENT
Start: 2024-02-20 | End: 2024-02-20

## 2023-12-12 RX ORDER — FAMOTIDINE 10 MG/ML
20 INJECTION, SOLUTION INTRAVENOUS
OUTPATIENT
Start: 2024-02-20

## 2023-12-12 RX ORDER — HEPARIN 100 UNIT/ML
500 SYRINGE INTRAVENOUS PRN
OUTPATIENT
Start: 2024-02-20

## 2023-12-12 RX ADMIN — ALTEPLASE 2 MG: 2.2 INJECTION, POWDER, LYOPHILIZED, FOR SOLUTION INTRAVENOUS at 11:58

## 2023-12-12 RX ADMIN — TRASTUZUMAB 399 MG: 150 INJECTION, POWDER, LYOPHILIZED, FOR SOLUTION INTRAVENOUS at 14:15

## 2023-12-12 RX ADMIN — ZOLEDRONIC ACID 3.5 MG: 4 INJECTION, SOLUTION, CONCENTRATE INTRAVENOUS at 14:58

## 2023-12-12 RX ADMIN — SODIUM CHLORIDE, PRESERVATIVE FREE 20 ML: 5 INJECTION INTRAVENOUS at 15:25

## 2023-12-12 RX ADMIN — SODIUM CHLORIDE 25 ML/HR: 9 INJECTION, SOLUTION INTRAVENOUS at 14:10

## 2023-12-12 NOTE — PROGRESS NOTES
[facility] Progress Note    Date: 2023    Name: Teo Treviño    MRN: [id number]         : 1960    Ms. Adelina Liu Arrived ambulatory and in no distress for C2D1 of Trastuzumab/Zometa Regimen. Assessment was completed, no acute issues at this time, no new complaints voiced. Right chest wall port accessed without difficulty, labs drawn & sent for processing. Patient proceed to appointment with Dr. Radha Fatima. Ms. Anca Mendez vitals were reviewed. Vitals:    23 1524   BP: 127/71   Pulse:    Resp:    Temp:    SpO2:        Lab results were obtained and reviewed.   Recent Results (from the past 12 hour(s))   Comprehensive Metabolic Panel    Collection Time: 23 11:40 AM   Result Value Ref Range    Sodium 137 136 - 145 mmol/L    Potassium 3.2 (L) 3.5 - 5.1 mmol/L    Chloride 102 97 - 108 mmol/L    CO2 31 21 - 32 mmol/L    Anion Gap 4 (L) 5 - 15 mmol/L    Glucose 175 (H) 65 - 100 mg/dL    BUN 21 (H) 6 - 20 MG/DL    Creatinine 1.14 (H) 0.55 - 1.02 MG/DL    Bun/Cre Ratio 18 12 - 20      Est, Glom Filt Rate 54 (L) >60 ml/min/1.73m2    Calcium 8.5 8.5 - 10.1 MG/DL    Total Bilirubin 0.9 0.2 - 1.0 MG/DL    ALT 13 12 - 78 U/L    AST 18 15 - 37 U/L    Alk Phosphatase 55 45 - 117 U/L    Total Protein 7.1 6.4 - 8.2 g/dL    Albumin 3.5 3.5 - 5.0 g/dL    Globulin 3.6 2.0 - 4.0 g/dL    Albumin/Globulin Ratio 1.0 (L) 1.1 - 2.2     CBC with Auto Differential    Collection Time: 23 11:40 AM   Result Value Ref Range    WBC 8.7 3.6 - 11.0 K/uL    RBC 4.47 3.80 - 5.20 M/uL    Hemoglobin 13.9 11.5 - 16.0 g/dL    Hematocrit 42.9 35.0 - 47.0 %    MCV 96.0 80.0 - 99.0 FL    MCH 31.1 26.0 - 34.0 PG    MCHC 32.4 30.0 - 36.5 g/dL    RDW 17.1 (H) 11.5 - 14.5 %    Platelets 574 113 - 681 K/uL    MPV 8.5 (L) 8.9 - 12.9 FL    Nucleated RBCs 0.0 0  WBC    nRBC 0.00 0.00 - 0.01 K/uL    Neutrophils % 87 (H) 32 - 75 %    Lymphocytes % 9 (L) 12 - 49 %    Monocytes % 3 (L) 5 - 13 %    Eosinophils % 0

## 2023-12-12 NOTE — PROGRESS NOTES
Patricia Stokes is a 61 y.o. female here today to follow up for breast cancer    1. Have you been to the ER, urgent care clinic since your last visit? Hospitalized since your last visit? no    2. Have you seen or consulted any other health care providers outside of the [address] [place] [address] since your last visit? Include any pap smears or colon screening.  nou
tracer activity. Maximum SUV corresponding to nodular area seen on image 88 show maximum SUV of 2.6. MEDIASTINUM AND CHEST LYMPH NODES:   *   No hypermetabolic lymphadenopathy in the chest.. Tip of the left chest port catheter is placed in the right atrium     ABDOMEN AND PELVIS   HEPATOBILIARY:   *  No focal abnormally increased trace uptake. PANCREAS:   *  No focal abnormally increased trace uptake. SPLEEN:   *   No focal abnormally increased trace uptake. ADRENAL GLANDS:   *   No focal abnormally increased trace uptake. KIDNEYS:   *   Physiologic trace excretion in both kidneys and therefore underlying lesion detection sensitivity is low. PERITONEUM/ MESENTERY/BOWEL:   *   Physiologic tracer excretion in the bowel loops and therefore underlying lesion detection sensitivity is low. There is no bowel obstruction or ascites. SUBDIAPHRAGMATIC LYMPH NODES:   *   No hypermetabolic lymphadenopathy in the scanned regions. PELVIS:   *   No hypermetabolic pelvic mass lesion. No free fluid in the pelvis. Physiologic tracer accumulation in the bladder and therefore underlying lesion detection sensitivity is low. BONES AND SOFT TISSUES:   *  Status post right mastectomy. Mild FDG uptake in the left breast with maximum SUV of 2.3 is seen. No hypermetabolic focal lytic or sclerotic bone lesions in the scanned regions. Bilateral gluteal subcutaneous fat stranding and tracer activity likely due to injections. Field of view truncation of CT images of the upper extremities limiting evaluation. Diffusely increased tracer activity in both shoulder joints right more than left and likely inflammatory in nature        PET 9/21/23  IMPRESSION:  Bilateral pulmonary infiltrates are mildly hypermetabolic,  underlying neoplasm is difficult to exclude. Minimally hypermetabolic old left  rib fracture. Diffuse uptake involving the right shoulder joint may be  inflammatory in nature.  There is otherwise no PET avid

## 2023-12-19 ENCOUNTER — APPOINTMENT (OUTPATIENT)
Facility: HOSPITAL | Age: 63
End: 2023-12-19

## 2023-12-22 ENCOUNTER — HOSPITAL ENCOUNTER (OUTPATIENT)
Facility: HOSPITAL | Age: 63
Discharge: HOME | End: 2023-12-25
Attending: INTERNAL MEDICINE

## 2023-12-22 DIAGNOSIS — C50.811 MALIGNANT NEOPLASM OF OVERLAPPING SITES OF RIGHT BREAST IN FEMALE, ESTROGEN RECEPTOR NEGATIVE (HCC): ICD-10-CM

## 2023-12-22 DIAGNOSIS — Z17.1 MALIGNANT NEOPLASM OF OVERLAPPING SITES OF RIGHT BREAST IN FEMALE, ESTROGEN RECEPTOR NEGATIVE (HCC): ICD-10-CM

## 2023-12-22 PROCEDURE — 71250 CT THORAX DX C-: CPT

## 2023-12-26 RX ORDER — ACETAMINOPHEN 325 MG/1
650 TABLET ORAL
Status: CANCELLED | OUTPATIENT
Start: 2024-01-02

## 2023-12-26 RX ORDER — FAMOTIDINE 10 MG/ML
20 INJECTION, SOLUTION INTRAVENOUS
Status: CANCELLED | OUTPATIENT
Start: 2024-01-02

## 2023-12-26 RX ORDER — DIPHENHYDRAMINE HYDROCHLORIDE 50 MG/ML
50 INJECTION INTRAMUSCULAR; INTRAVENOUS
Status: CANCELLED | OUTPATIENT
Start: 2024-01-02

## 2023-12-26 RX ORDER — EPINEPHRINE 1 MG/ML
0.3 INJECTION, SOLUTION INTRAMUSCULAR; SUBCUTANEOUS PRN
Status: CANCELLED | OUTPATIENT
Start: 2024-01-02

## 2023-12-26 RX ORDER — ALBUTEROL SULFATE 90 UG/1
4 AEROSOL, METERED RESPIRATORY (INHALATION) PRN
Status: CANCELLED | OUTPATIENT
Start: 2024-01-02

## 2023-12-26 RX ORDER — MEPERIDINE HYDROCHLORIDE 25 MG/ML
12.5 INJECTION INTRAMUSCULAR; INTRAVENOUS; SUBCUTANEOUS PRN
Status: CANCELLED | OUTPATIENT
Start: 2024-01-02

## 2023-12-26 RX ORDER — ONDANSETRON 2 MG/ML
8 INJECTION INTRAMUSCULAR; INTRAVENOUS
Status: CANCELLED | OUTPATIENT
Start: 2024-01-02

## 2023-12-26 RX ORDER — SODIUM CHLORIDE 9 MG/ML
INJECTION, SOLUTION INTRAVENOUS CONTINUOUS
Status: CANCELLED | OUTPATIENT
Start: 2024-01-02

## 2023-12-26 RX ORDER — SODIUM CHLORIDE 9 MG/ML
5-250 INJECTION, SOLUTION INTRAVENOUS PRN
Status: CANCELLED | OUTPATIENT
Start: 2024-01-02

## 2023-12-26 RX ORDER — HEPARIN 100 UNIT/ML
500 SYRINGE INTRAVENOUS PRN
Status: CANCELLED | OUTPATIENT
Start: 2024-01-02

## 2023-12-30 LAB
ACID FAST STN SPEC: NEGATIVE
MYCOBACTERIUM SPEC QL CULT: NEGATIVE
SPECIMEN PREPARATION: NORMAL
SPECIMEN SOURCE: NORMAL

## 2024-01-02 ENCOUNTER — OFFICE VISIT (OUTPATIENT)
Age: 64
End: 2024-01-02
Payer: SUBSIDIZED

## 2024-01-02 ENCOUNTER — HOSPITAL ENCOUNTER (OUTPATIENT)
Facility: HOSPITAL | Age: 64
Setting detail: INFUSION SERIES
Discharge: HOME | End: 2024-01-02

## 2024-01-02 VITALS
DIASTOLIC BLOOD PRESSURE: 79 MMHG | WEIGHT: 139 LBS | BODY MASS INDEX: 25.58 KG/M2 | SYSTOLIC BLOOD PRESSURE: 122 MMHG | RESPIRATION RATE: 16 BRPM | HEIGHT: 62 IN | OXYGEN SATURATION: 99 % | TEMPERATURE: 98.2 F | HEART RATE: 79 BPM

## 2024-01-02 VITALS
WEIGHT: 139 LBS | TEMPERATURE: 98.2 F | SYSTOLIC BLOOD PRESSURE: 129 MMHG | BODY MASS INDEX: 25.42 KG/M2 | RESPIRATION RATE: 16 BRPM | HEART RATE: 86 BPM | DIASTOLIC BLOOD PRESSURE: 60 MMHG | OXYGEN SATURATION: 98 %

## 2024-01-02 DIAGNOSIS — Z17.1 MALIGNANT NEOPLASM OF BREAST IN FEMALE, ESTROGEN RECEPTOR NEGATIVE, UNSPECIFIED LATERALITY, UNSPECIFIED SITE OF BREAST (HCC): ICD-10-CM

## 2024-01-02 DIAGNOSIS — Z51.11 ENCOUNTER FOR ANTINEOPLASTIC CHEMOTHERAPY: ICD-10-CM

## 2024-01-02 DIAGNOSIS — Z17.1 MALIGNANT NEOPLASM OF OVERLAPPING SITES OF RIGHT BREAST IN FEMALE, ESTROGEN RECEPTOR NEGATIVE (HCC): Primary | ICD-10-CM

## 2024-01-02 DIAGNOSIS — Z51.11 ENCOUNTER FOR ANTINEOPLASTIC CHEMOTHERAPY: Primary | ICD-10-CM

## 2024-01-02 DIAGNOSIS — C50.919 CARCINOMA OF BREAST METASTATIC TO BONE, UNSPECIFIED LATERALITY (HCC): ICD-10-CM

## 2024-01-02 DIAGNOSIS — C79.51 CARCINOMA OF BREAST METASTATIC TO BONE, UNSPECIFIED LATERALITY (HCC): ICD-10-CM

## 2024-01-02 DIAGNOSIS — J84.9 ILD (INTERSTITIAL LUNG DISEASE) (HCC): ICD-10-CM

## 2024-01-02 DIAGNOSIS — R05.1 ACUTE COUGH: ICD-10-CM

## 2024-01-02 DIAGNOSIS — C50.811 MALIGNANT NEOPLASM OF OVERLAPPING SITES OF RIGHT BREAST IN FEMALE, ESTROGEN RECEPTOR NEGATIVE (HCC): Primary | ICD-10-CM

## 2024-01-02 DIAGNOSIS — C50.919 MALIGNANT NEOPLASM OF BREAST IN FEMALE, ESTROGEN RECEPTOR NEGATIVE, UNSPECIFIED LATERALITY, UNSPECIFIED SITE OF BREAST (HCC): ICD-10-CM

## 2024-01-02 LAB
ALBUMIN SERPL-MCNC: 3.7 G/DL (ref 3.5–5)
ALBUMIN/GLOB SERPL: 1 (ref 1.1–2.2)
ALP SERPL-CCNC: 59 U/L (ref 45–117)
ALT SERPL-CCNC: 17 U/L (ref 12–78)
ANION GAP SERPL CALC-SCNC: 6 MMOL/L (ref 5–15)
AST SERPL-CCNC: 21 U/L (ref 15–37)
BASOPHILS # BLD: 0 K/UL (ref 0–0.1)
BASOPHILS NFR BLD: 0 % (ref 0–1)
BILIRUB SERPL-MCNC: 0.7 MG/DL (ref 0.2–1)
BUN SERPL-MCNC: 23 MG/DL (ref 6–20)
BUN/CREAT SERPL: 21 (ref 12–20)
CALCIUM SERPL-MCNC: 9.6 MG/DL (ref 8.5–10.1)
CHLORIDE SERPL-SCNC: 104 MMOL/L (ref 97–108)
CO2 SERPL-SCNC: 27 MMOL/L (ref 21–32)
CREAT SERPL-MCNC: 1.07 MG/DL (ref 0.55–1.02)
DIFFERENTIAL METHOD BLD: ABNORMAL
EOSINOPHIL # BLD: 0 K/UL (ref 0–0.4)
EOSINOPHIL NFR BLD: 0 % (ref 0–7)
ERYTHROCYTE [DISTWIDTH] IN BLOOD BY AUTOMATED COUNT: 16.6 % (ref 11.5–14.5)
GLOBULIN SER CALC-MCNC: 3.8 G/DL (ref 2–4)
GLUCOSE SERPL-MCNC: 248 MG/DL (ref 65–100)
HCT VFR BLD AUTO: 43.1 % (ref 35–47)
HGB BLD-MCNC: 14.7 G/DL (ref 11.5–16)
IMM GRANULOCYTES # BLD AUTO: 0.2 K/UL (ref 0–0.04)
IMM GRANULOCYTES NFR BLD AUTO: 2 % (ref 0–0.5)
LYMPHOCYTES # BLD: 1.3 K/UL (ref 0.8–3.5)
LYMPHOCYTES NFR BLD: 11 % (ref 12–49)
MCH RBC QN AUTO: 31.4 PG (ref 26–34)
MCHC RBC AUTO-ENTMCNC: 34.1 G/DL (ref 30–36.5)
MCV RBC AUTO: 92.1 FL (ref 80–99)
MONOCYTES # BLD: 0.8 K/UL (ref 0–1)
MONOCYTES NFR BLD: 7 % (ref 5–13)
NEUTS SEG # BLD: 9.7 K/UL (ref 1.8–8)
NEUTS SEG NFR BLD: 80 % (ref 32–75)
NRBC # BLD: 0 K/UL (ref 0–0.01)
NRBC BLD-RTO: 0 PER 100 WBC
PLATELET # BLD AUTO: 294 K/UL (ref 150–400)
PMV BLD AUTO: 8.4 FL (ref 8.9–12.9)
POTASSIUM SERPL-SCNC: 3 MMOL/L (ref 3.5–5.1)
PROT SERPL-MCNC: 7.5 G/DL (ref 6.4–8.2)
RBC # BLD AUTO: 4.68 M/UL (ref 3.8–5.2)
RBC MORPH BLD: ABNORMAL
SODIUM SERPL-SCNC: 137 MMOL/L (ref 136–145)
WBC # BLD AUTO: 12 K/UL (ref 3.6–11)

## 2024-01-02 PROCEDURE — 96417 CHEMO IV INFUS EACH ADDL SEQ: CPT

## 2024-01-02 PROCEDURE — 99215 OFFICE O/P EST HI 40 MIN: CPT | Performed by: INTERNAL MEDICINE

## 2024-01-02 PROCEDURE — 80053 COMPREHEN METABOLIC PANEL: CPT

## 2024-01-02 PROCEDURE — 36415 COLL VENOUS BLD VENIPUNCTURE: CPT

## 2024-01-02 PROCEDURE — 6360000002 HC RX W HCPCS: Performed by: INTERNAL MEDICINE

## 2024-01-02 PROCEDURE — 6370000000 HC RX 637 (ALT 250 FOR IP): Performed by: NURSE PRACTITIONER

## 2024-01-02 PROCEDURE — 85025 COMPLETE CBC W/AUTO DIFF WBC: CPT

## 2024-01-02 PROCEDURE — 2580000003 HC RX 258: Performed by: INTERNAL MEDICINE

## 2024-01-02 RX ORDER — SODIUM CHLORIDE 9 MG/ML
5-250 INJECTION, SOLUTION INTRAVENOUS PRN
Status: DISCONTINUED | OUTPATIENT
Start: 2024-01-02 | End: 2024-01-03 | Stop reason: HOSPADM

## 2024-01-02 RX ORDER — PREDNISONE 10 MG/1
20 TABLET ORAL DAILY
Qty: 180 TABLET | Refills: 0 | Status: SHIPPED | OUTPATIENT
Start: 2024-01-02 | End: 2024-04-01

## 2024-01-02 RX ORDER — SODIUM CHLORIDE 0.9 % (FLUSH) 0.9 %
5-40 SYRINGE (ML) INJECTION PRN
Status: DISCONTINUED | OUTPATIENT
Start: 2024-01-02 | End: 2024-01-03 | Stop reason: HOSPADM

## 2024-01-02 RX ORDER — POTASSIUM CHLORIDE 750 MG/1
60 TABLET, FILM COATED, EXTENDED RELEASE ORAL ONCE
Status: COMPLETED | OUTPATIENT
Start: 2024-01-02 | End: 2024-01-02

## 2024-01-02 RX ADMIN — POTASSIUM CHLORIDE 60 MEQ: 750 TABLET, FILM COATED, EXTENDED RELEASE ORAL at 15:01

## 2024-01-02 RX ADMIN — TRASTUZUMAB 399 MG: 150 INJECTION, POWDER, LYOPHILIZED, FOR SOLUTION INTRAVENOUS at 14:25

## 2024-01-02 ASSESSMENT — PAIN SCALES - GENERAL: PAINLEVEL_OUTOF10: 0

## 2024-01-02 NOTE — PROGRESS NOTES
Debra Moser is a 63 y.o. female here today to follow up for breast cancer    1. Have you been to the ER, urgent care clinic since your last visit?  Hospitalized since your last visit? no    2. Have you seen or consulted any other health care providers outside of the [facility] since your last visit?  Include any pap smears or colon screening. no    
scheduling shipment.      High resolution CT on 12/23/23 showed progression of disease but her cough is improved. Will repeat scans in 6 weeks.     We will plan to see the patient in follow up at least once per cycle, or sooner if symptoms warrant. Labs with each cycle for intensive monitoring for toxicity    The duration of this treatment plan will be until progression, intolerable side effects, or patient choice.    Pill diary given    Rx:  zofran, compazine and emla cream, urea cream    2. Bone Metastases: Discussed zometa 4 mg q 3 months IV:  We discussed side effects such as ONJ and the need to avoid invasive dental procedures while on these medications, renal damage, and hypocalcemia.  Last given 12/12/23.    3. Emotional well being:  She has excellent support and is coping well with her disease    4. DM2:  was on metformin, elevated today due to steroids.     5. Right Shoulder pain:  on celebrex; she has started PT.    6. Cough: she reports recent viral symptoms, infiltrates worsened on recent imaging concerning for ILD. Given z-pack and predisone taper, Taking 10 mg prednisone daily now, decreased on 10/21/23. Cough resolved. Repeat high res CT 10/12/23 shows continued concern for ILD. Referringed to pulm, saw Dr. Esteves, bronch is NTD for infection    [phone], fani alexander, son    Repeat high res CT ordered but not obtained. She underwent bronchoscopy 11/16/23 with cultures negative at this point. Restarted 40 mg prednisone daily and planned to repeat high res CT which was not scheduled. Decreased to 30mg prednisone 12/12/23. Cough improved per patient. Will further decrease to 20mg daily today, 1/2/24.     7. HTN: amlodipine 5mg daily    Entire visit was done with Lizabeth from [employer] translating    I personally saw and evaluated the patient and performed the entire medical decision making.  The history, physical exam, and documentation were performed by Rochelle Florian NP.  I reviewed and

## 2024-01-02 NOTE — PATIENT INSTRUCTIONS
CAPECITABINA    Tomese 3 tabletas por via oral 2 veces al shantal por 2 semanas (14 alejo). Luego descanse la tercera semana ( 7 alejo).  Repita todo el ciclo, tomandose la 3  tabletas 2 veces al shantal para la semana 1 y 2. Luego descanse en la 3a semana.  Liz ciclo se repetira hasta que el Doctor le indique lo contrario.

## 2024-01-02 NOTE — PROGRESS NOTES
[facility] Progress Note    Date: 2024    Name: Debra Moser    MRN: [id number]         : 1960    Ms. Lobito Moser Arrived ambulatory and in no distress for C3D1 of Regimen.  Assessment was completed, no acute issues at this time, no new complaints voiced.   chest wall port accessed without difficulty, labs drawn & sent for processing.         Patient proceed to appointment with Dr. Underwood.    Ms. Lobito Moser's vitals were reviewed.  Vitals:    24 1445   BP: 122/79   Pulse: 79   Resp: 16   Temp:    SpO2:        Lab results were obtained and reviewed.  Recent Results (from the past 12 hour(s))   Comprehensive metabolic panel    Collection Time: 24 11:55 AM   Result Value Ref Range    Sodium 137 136 - 145 mmol/L    Potassium 3.0 (L) 3.5 - 5.1 mmol/L    Chloride 104 97 - 108 mmol/L    CO2 27 21 - 32 mmol/L    Anion Gap 6 5 - 15 mmol/L    Glucose 248 (H) 65 - 100 mg/dL    BUN 23 (H) 6 - 20 MG/DL    Creatinine 1.07 (H) 0.55 - 1.02 MG/DL    Bun/Cre Ratio 21 (H) 12 - 20      Est, Glom Filt Rate 58 (L) >60 ml/min/1.73m2    Calcium 9.6 8.5 - 10.1 MG/DL    Total Bilirubin 0.7 0.2 - 1.0 MG/DL    ALT 17 12 - 78 U/L    AST 21 15 - 37 U/L    Alk Phosphatase 59 45 - 117 U/L    Total Protein 7.5 6.4 - 8.2 g/dL    Albumin 3.7 3.5 - 5.0 g/dL    Globulin 3.8 2.0 - 4.0 g/dL    Albumin/Globulin Ratio 1.0 (L) 1.1 - 2.2     CBC With Auto Differential    Collection Time: 24 11:55 AM   Result Value Ref Range    WBC 12.0 (H) 3.6 - 11.0 K/uL    RBC 4.68 3.80 - 5.20 M/uL    Hemoglobin 14.7 11.5 - 16.0 g/dL    Hematocrit 43.1 35.0 - 47.0 %    MCV 92.1 80.0 - 99.0 FL    MCH 31.4 26.0 - 34.0 PG    MCHC 34.1 30.0 - 36.5 g/dL    RDW 16.6 (H) 11.5 - 14.5 %    Platelets 294 150 - 400 K/uL    MPV 8.4 (L) 8.9 - 12.9 FL    Nucleated RBCs 0.0 0  WBC    nRBC 0.00 0.00 - 0.01 K/uL    Neutrophils % 80 (H) 32 - 75 %    Lymphocytes % 11 (L) 12 - 49 %    Monocytes % 7 5 - 13 %    Eosinophils % 0 0 - 7 %

## 2024-01-08 ENCOUNTER — TELEPHONE (OUTPATIENT)
Age: 64
End: 2024-01-08

## 2024-01-08 NOTE — TELEPHONE ENCOUNTER
Patients daughter called, with , and stated that she has some questions about the patients xeloda medication and when and how it will be delivered to them. Requested a call back to discuss.     CB# [phone]

## 2024-01-16 RX ORDER — DIPHENHYDRAMINE HYDROCHLORIDE 50 MG/ML
50 INJECTION INTRAMUSCULAR; INTRAVENOUS
Status: CANCELLED | OUTPATIENT
Start: 2024-01-23

## 2024-01-16 RX ORDER — MEPERIDINE HYDROCHLORIDE 25 MG/ML
12.5 INJECTION INTRAMUSCULAR; INTRAVENOUS; SUBCUTANEOUS PRN
Status: CANCELLED | OUTPATIENT
Start: 2024-01-23

## 2024-01-16 RX ORDER — ACETAMINOPHEN 325 MG/1
650 TABLET ORAL
Status: CANCELLED | OUTPATIENT
Start: 2024-01-23

## 2024-01-16 RX ORDER — EPINEPHRINE 1 MG/ML
0.3 INJECTION, SOLUTION INTRAMUSCULAR; SUBCUTANEOUS PRN
Status: CANCELLED | OUTPATIENT
Start: 2024-01-23

## 2024-01-16 RX ORDER — HEPARIN 100 UNIT/ML
500 SYRINGE INTRAVENOUS PRN
Status: CANCELLED | OUTPATIENT
Start: 2024-01-23

## 2024-01-16 RX ORDER — SODIUM CHLORIDE 9 MG/ML
5-250 INJECTION, SOLUTION INTRAVENOUS PRN
Status: CANCELLED | OUTPATIENT
Start: 2024-01-23

## 2024-01-16 RX ORDER — FAMOTIDINE 10 MG/ML
20 INJECTION, SOLUTION INTRAVENOUS
Status: CANCELLED | OUTPATIENT
Start: 2024-01-23

## 2024-01-16 RX ORDER — ONDANSETRON 2 MG/ML
8 INJECTION INTRAMUSCULAR; INTRAVENOUS
Status: CANCELLED | OUTPATIENT
Start: 2024-01-23

## 2024-01-16 RX ORDER — ALBUTEROL SULFATE 90 UG/1
4 AEROSOL, METERED RESPIRATORY (INHALATION) PRN
Status: CANCELLED | OUTPATIENT
Start: 2024-01-23

## 2024-01-16 RX ORDER — SODIUM CHLORIDE 9 MG/ML
INJECTION, SOLUTION INTRAVENOUS CONTINUOUS
Status: CANCELLED | OUTPATIENT
Start: 2024-01-23

## 2024-01-23 ENCOUNTER — OFFICE VISIT (OUTPATIENT)
Age: 64
End: 2024-01-23

## 2024-01-23 ENCOUNTER — HOSPITAL ENCOUNTER (OUTPATIENT)
Facility: HOSPITAL | Age: 64
Setting detail: INFUSION SERIES
Discharge: HOME | End: 2024-01-23

## 2024-01-23 VITALS
BODY MASS INDEX: 25.6 KG/M2 | WEIGHT: 140 LBS | TEMPERATURE: 97.5 F | RESPIRATION RATE: 16 BRPM | SYSTOLIC BLOOD PRESSURE: 136 MMHG | HEART RATE: 87 BPM | DIASTOLIC BLOOD PRESSURE: 71 MMHG | OXYGEN SATURATION: 97 %

## 2024-01-23 VITALS
DIASTOLIC BLOOD PRESSURE: 71 MMHG | TEMPERATURE: 97.5 F | RESPIRATION RATE: 17 BRPM | HEART RATE: 87 BPM | HEIGHT: 62 IN | OXYGEN SATURATION: 97 % | SYSTOLIC BLOOD PRESSURE: 136 MMHG | BODY MASS INDEX: 25.93 KG/M2 | WEIGHT: 140.9 LBS

## 2024-01-23 DIAGNOSIS — C50.919 MALIGNANT NEOPLASM OF BREAST IN FEMALE, ESTROGEN RECEPTOR NEGATIVE, UNSPECIFIED LATERALITY, UNSPECIFIED SITE OF BREAST (HCC): ICD-10-CM

## 2024-01-23 DIAGNOSIS — C79.51 CARCINOMA OF BREAST METASTATIC TO BONE, UNSPECIFIED LATERALITY (HCC): ICD-10-CM

## 2024-01-23 DIAGNOSIS — C50.811 MALIGNANT NEOPLASM OF OVERLAPPING SITES OF RIGHT BREAST IN FEMALE, ESTROGEN RECEPTOR NEGATIVE (HCC): Primary | ICD-10-CM

## 2024-01-23 DIAGNOSIS — Z51.11 ENCOUNTER FOR ANTINEOPLASTIC CHEMOTHERAPY: Primary | ICD-10-CM

## 2024-01-23 DIAGNOSIS — C50.919 CARCINOMA OF BREAST METASTATIC TO BONE, UNSPECIFIED LATERALITY (HCC): ICD-10-CM

## 2024-01-23 DIAGNOSIS — Z17.1 MALIGNANT NEOPLASM OF OVERLAPPING SITES OF RIGHT BREAST IN FEMALE, ESTROGEN RECEPTOR NEGATIVE (HCC): Primary | ICD-10-CM

## 2024-01-23 DIAGNOSIS — Z17.1 MALIGNANT NEOPLASM OF BREAST IN FEMALE, ESTROGEN RECEPTOR NEGATIVE, UNSPECIFIED LATERALITY, UNSPECIFIED SITE OF BREAST (HCC): ICD-10-CM

## 2024-01-23 LAB
ALBUMIN SERPL-MCNC: 3.5 G/DL (ref 3.5–5)
ALBUMIN/GLOB SERPL: 0.9 (ref 1.1–2.2)
ALP SERPL-CCNC: 61 U/L (ref 45–117)
ALT SERPL-CCNC: 20 U/L (ref 12–78)
ANION GAP SERPL CALC-SCNC: 5 MMOL/L (ref 5–15)
AST SERPL-CCNC: 28 U/L (ref 15–37)
BASOPHILS # BLD: 0 K/UL (ref 0–0.1)
BASOPHILS NFR BLD: 0 % (ref 0–1)
BILIRUB SERPL-MCNC: 1.1 MG/DL (ref 0.2–1)
BUN SERPL-MCNC: 18 MG/DL (ref 6–20)
BUN/CREAT SERPL: 17 (ref 12–20)
CALCIUM SERPL-MCNC: 8.8 MG/DL (ref 8.5–10.1)
CHLORIDE SERPL-SCNC: 106 MMOL/L (ref 97–108)
CO2 SERPL-SCNC: 27 MMOL/L (ref 21–32)
CREAT SERPL-MCNC: 1.08 MG/DL (ref 0.55–1.02)
DIFFERENTIAL METHOD BLD: ABNORMAL
EOSINOPHIL # BLD: 0 K/UL (ref 0–0.4)
EOSINOPHIL NFR BLD: 0 % (ref 0–7)
ERYTHROCYTE [DISTWIDTH] IN BLOOD BY AUTOMATED COUNT: 18.5 % (ref 11.5–14.5)
GLOBULIN SER CALC-MCNC: 3.7 G/DL (ref 2–4)
GLUCOSE SERPL-MCNC: 295 MG/DL (ref 65–100)
HCT VFR BLD AUTO: 40 % (ref 35–47)
HGB BLD-MCNC: 13.5 G/DL (ref 11.5–16)
IMM GRANULOCYTES # BLD AUTO: 0.1 K/UL (ref 0–0.04)
IMM GRANULOCYTES NFR BLD AUTO: 1 % (ref 0–0.5)
LYMPHOCYTES # BLD: 0.7 K/UL (ref 0.8–3.5)
LYMPHOCYTES NFR BLD: 8 % (ref 12–49)
MCH RBC QN AUTO: 31.9 PG (ref 26–34)
MCHC RBC AUTO-ENTMCNC: 33.8 G/DL (ref 30–36.5)
MCV RBC AUTO: 94.6 FL (ref 80–99)
MONOCYTES # BLD: 0.3 K/UL (ref 0–1)
MONOCYTES NFR BLD: 3 % (ref 5–13)
NEUTS SEG # BLD: 8 K/UL (ref 1.8–8)
NEUTS SEG NFR BLD: 88 % (ref 32–75)
NRBC # BLD: 0 K/UL (ref 0–0.01)
NRBC BLD-RTO: 0 PER 100 WBC
PLATELET # BLD AUTO: 294 K/UL (ref 150–400)
PMV BLD AUTO: 7.9 FL (ref 8.9–12.9)
POTASSIUM SERPL-SCNC: 3.6 MMOL/L (ref 3.5–5.1)
PROT SERPL-MCNC: 7.2 G/DL (ref 6.4–8.2)
RBC # BLD AUTO: 4.23 M/UL (ref 3.8–5.2)
RBC MORPH BLD: ABNORMAL
SODIUM SERPL-SCNC: 138 MMOL/L (ref 136–145)
WBC # BLD AUTO: 9.1 K/UL (ref 3.6–11)

## 2024-01-23 PROCEDURE — 99215 OFFICE O/P EST HI 40 MIN: CPT | Performed by: INTERNAL MEDICINE

## 2024-01-23 PROCEDURE — 80053 COMPREHEN METABOLIC PANEL: CPT

## 2024-01-23 PROCEDURE — 6360000002 HC RX W HCPCS: Performed by: INTERNAL MEDICINE

## 2024-01-23 PROCEDURE — 96413 CHEMO IV INFUSION 1 HR: CPT

## 2024-01-23 PROCEDURE — 2580000003 HC RX 258: Performed by: INTERNAL MEDICINE

## 2024-01-23 PROCEDURE — 85025 COMPLETE CBC W/AUTO DIFF WBC: CPT

## 2024-01-23 RX ORDER — SODIUM CHLORIDE 0.9 % (FLUSH) 0.9 %
5-40 SYRINGE (ML) INJECTION PRN
Status: DISCONTINUED | OUTPATIENT
Start: 2024-01-23 | End: 2024-01-24 | Stop reason: HOSPADM

## 2024-01-23 RX ORDER — CAPECITABINE 500 MG/1
TABLET, FILM COATED ORAL
Qty: 84 TABLET | Refills: 5 | Status: ACTIVE | OUTPATIENT
Start: 2024-01-23

## 2024-01-23 RX ORDER — SODIUM CHLORIDE 9 MG/ML
5-250 INJECTION, SOLUTION INTRAVENOUS PRN
Status: DISCONTINUED | OUTPATIENT
Start: 2024-01-23 | End: 2024-01-24 | Stop reason: HOSPADM

## 2024-01-23 RX ADMIN — TRASTUZUMAB 399 MG: 150 INJECTION, POWDER, LYOPHILIZED, FOR SOLUTION INTRAVENOUS at 14:05

## 2024-01-23 RX ADMIN — SODIUM CHLORIDE, PRESERVATIVE FREE 20 ML: 5 INJECTION INTRAVENOUS at 14:40

## 2024-01-23 RX ADMIN — SODIUM CHLORIDE 25 ML/HR: 9 INJECTION, SOLUTION INTRAVENOUS at 14:00

## 2024-01-23 ASSESSMENT — PAIN SCALES - GENERAL: PAINLEVEL_OUTOF10: 0

## 2024-01-23 NOTE — PROGRESS NOTES
[facility] Chemo Progress Note    Date: January 23, 2024        1140: Ms. Lobito Mosre Arrived to [facility] for  C4D1 Herceptin ambulatory in stable condition.  Assessment was completed and port accessed by Paula Lee RN. Labs drawn and sent for processing. Went to provider appointment with Medical Oncology.    1320: Returned from provider appointment. Labs reviewed. Criteria for treatment was met.    Ms. Lobito Moser's vitals were reviewed.  Patient Vitals for the past 12 hrs:   Temp Pulse Resp BP SpO2   01/23/24 1146 97.5 °F (36.4 °C) 87 17 136/71 97 %         Lab results were obtained and reviewed.  Recent Results (from the past 12 hour(s))   CBC with Auto Differential    Collection Time: 01/23/24 11:49 AM   Result Value Ref Range    WBC 9.1 3.6 - 11.0 K/uL    RBC 4.23 3.80 - 5.20 M/uL    Hemoglobin 13.5 11.5 - 16.0 g/dL    Hematocrit 40.0 35.0 - 47.0 %    MCV 94.6 80.0 - 99.0 FL    MCH 31.9 26.0 - 34.0 PG    MCHC 33.8 30.0 - 36.5 g/dL    RDW 18.5 (H) 11.5 - 14.5 %    Platelets 294 150 - 400 K/uL    MPV 7.9 (L) 8.9 - 12.9 FL    Nucleated RBCs 0.0 0  WBC    nRBC 0.00 0.00 - 0.01 K/uL    Neutrophils % 88 (H) 32 - 75 %    Lymphocytes % 8 (L) 12 - 49 %    Monocytes % 3 (L) 5 - 13 %    Eosinophils % 0 0 - 7 %    Basophils % 0 0 - 1 %    Immature Granulocytes 1 (H) 0.0 - 0.5 %    Neutrophils Absolute 8.0 1.8 - 8.0 K/UL    Lymphocytes Absolute 0.7 (L) 0.8 - 3.5 K/UL    Monocytes Absolute 0.3 0.0 - 1.0 K/UL    Eosinophils Absolute 0.0 0.0 - 0.4 K/UL    Basophils Absolute 0.0 0.0 - 0.1 K/UL    Absolute Immature Granulocyte 0.1 (H) 0.00 - 0.04 K/UL    Differential Type SMEAR SCANNED      RBC Comment ANISOCYTOSIS  1+       Comprehensive Metabolic Panel    Collection Time: 01/23/24 11:49 AM   Result Value Ref Range    Sodium 138 136 - 145 mmol/L    Potassium 3.6 3.5 - 5.1 mmol/L    Chloride 106 97 - 108 mmol/L    CO2 27 21 - 32 mmol/L    Anion Gap 5 5 - 15 mmol/L    Glucose 295 (H) 65 - 100 mg/dL    BUN 18 6 - 20 MG/DL

## 2024-01-23 NOTE — PROGRESS NOTES
Debra Moser is a 63 y.o. female here today to follow up for breast cancer    1. Have you been to the ER, urgent care clinic since your last visit?  Hospitalized since your last visit? no    2. Have you seen or consulted any other health care providers outside of the [facility] since your last visit?  Include any pap smears or colon screening. no    
characterized  convincingly as a hemangioma. The other 2 are of uncertain etiology but are not  hypermetabolic on recent PET/CT. Bilateral pulmonary infiltrates are stable  compared to the prior PET/CT, close follow-up is recommended to ensure lack of  underlying neoplasm.    Bone Scan 9/25/23  IMPRESSION:  1. There is marked increased activity overlying right humeral head and AC joint  most likely degenerative. Continued close follow-up would be helpful.  2. Activity left 10th rib posteriorly is consistent with a fracture. Slight  activity left greater trochanter is most likely related to tendinopathy. A  definite pattern of bony metastatic disease is not identified    Records reviewed and summarized above.  Pathology report(s) reviewed above.  Radiology report(s) reviewed above.      Assessment/plan:   1.  Metastatic recurrent right breast cancer, ER negative, SD negative, HER 2 positive:      Mets to neck LN, hilar LN, left ax LN, liver, lung, bones    Discussed that while this is treatable, it is not curable, goal of therapy is palliative.      repeated PET scan to compare apples to apples, and also get CT c/a/p and bone scan. PET 9/21/23, CT 9/26/23 and Bone scan 9/25/23 reviewed, no progression but some worsening pulmonary infiltrates. Treatment held, see # 6    Discussed side effects on T-Dxd including but not limited to alopecia, nausea, vomiting, fatigue, myelosuppression, heart damage, ILD, mouth sores, liver toxicity. She is agreeable to continue and has signed informed consent. She presents today for treatment, cough has resolved. Repeat high resolution CT chronic atypical infection/inflammation.  Will hold T-Dxd for now.  Discussed with Dr. Esteves and bronch performed.     Will change to [id number] with capecitabine, tucatinib, trastuzumab.  Negative for DPD deficiency 10/17/23.    [id number] (capecitabine 1000 mg/m2 on days 1-14 then 7 days off; trastuzumab 8 mg/kg load then 6 mg/kg q 3 weeks IV;

## 2024-01-30 ENCOUNTER — HOSPITAL ENCOUNTER (OUTPATIENT)
Facility: HOSPITAL | Age: 64
Discharge: HOME | End: 2024-02-02

## 2024-01-30 DIAGNOSIS — C50.811 MALIGNANT NEOPLASM OF OVERLAPPING SITES OF RIGHT BREAST IN FEMALE, ESTROGEN RECEPTOR NEGATIVE (HCC): ICD-10-CM

## 2024-01-30 DIAGNOSIS — C79.51 CARCINOMA OF BREAST METASTATIC TO BONE, UNSPECIFIED LATERALITY (HCC): ICD-10-CM

## 2024-01-30 DIAGNOSIS — C50.919 CARCINOMA OF BREAST METASTATIC TO BONE, UNSPECIFIED LATERALITY (HCC): ICD-10-CM

## 2024-01-30 DIAGNOSIS — Z17.1 MALIGNANT NEOPLASM OF OVERLAPPING SITES OF RIGHT BREAST IN FEMALE, ESTROGEN RECEPTOR NEGATIVE (HCC): ICD-10-CM

## 2024-01-30 PROCEDURE — 3430000000 HC RX DIAGNOSTIC RADIOPHARMACEUTICAL: Performed by: NURSE PRACTITIONER

## 2024-01-30 PROCEDURE — 6360000004 HC RX CONTRAST MEDICATION: Performed by: NURSE PRACTITIONER

## 2024-01-30 PROCEDURE — 78306 BONE IMAGING WHOLE BODY: CPT | Performed by: NURSE PRACTITIONER

## 2024-01-30 PROCEDURE — A9503 TC99M MEDRONATE: HCPCS | Performed by: NURSE PRACTITIONER

## 2024-01-30 PROCEDURE — 6360000002 HC RX W HCPCS: Performed by: NURSE PRACTITIONER

## 2024-01-30 PROCEDURE — 74177 CT ABD & PELVIS W/CONTRAST: CPT

## 2024-01-30 RX ORDER — HEPARIN 100 UNIT/ML
500 SYRINGE INTRAVENOUS ONCE
Status: COMPLETED | OUTPATIENT
Start: 2024-01-30 | End: 2024-01-30

## 2024-01-30 RX ORDER — TC 99M MEDRONATE 20 MG/10ML
25 INJECTION, POWDER, LYOPHILIZED, FOR SOLUTION INTRAVENOUS
Status: COMPLETED | OUTPATIENT
Start: 2024-01-30 | End: 2024-01-30

## 2024-01-30 RX ADMIN — TC 99M MEDRONATE 25 MILLICURIE: 20 INJECTION, POWDER, LYOPHILIZED, FOR SOLUTION INTRAVENOUS at 07:45

## 2024-01-30 RX ADMIN — HEPARIN 500 UNITS: 100 SYRINGE at 08:38

## 2024-01-30 RX ADMIN — IOPAMIDOL 100 ML: 755 INJECTION, SOLUTION INTRAVENOUS at 08:27

## 2024-02-08 ENCOUNTER — TELEPHONE (OUTPATIENT)
Age: 64
End: 2024-02-08

## 2024-02-12 ENCOUNTER — TELEPHONE (OUTPATIENT)
Age: 64
End: 2024-02-12

## 2024-02-12 NOTE — TELEPHONE ENCOUNTER
Pt  called to confirm tomorrows appointment and would like to be call beforehand.  CB#[phone]

## 2024-02-12 NOTE — TELEPHONE ENCOUNTER
[facility] [place] at [facility]  [phone]    02/12/24 4:35 PM EST - Called and left a message confirming the patient's appointment for tomorrow.

## 2024-02-13 ENCOUNTER — APPOINTMENT (OUTPATIENT)
Facility: HOSPITAL | Age: 64
End: 2024-02-13
Payer: SUBSIDIZED

## 2024-02-13 ENCOUNTER — CLINICAL DOCUMENTATION (OUTPATIENT)
Facility: HOSPITAL | Age: 64
End: 2024-02-13

## 2024-02-13 ENCOUNTER — HOSPITAL ENCOUNTER (OUTPATIENT)
Facility: HOSPITAL | Age: 64
Setting detail: INFUSION SERIES
Discharge: HOME | End: 2024-02-13
Payer: SUBSIDIZED

## 2024-02-13 ENCOUNTER — OFFICE VISIT (OUTPATIENT)
Age: 64
End: 2024-02-13

## 2024-02-13 VITALS
SYSTOLIC BLOOD PRESSURE: 121 MMHG | WEIGHT: 141 LBS | OXYGEN SATURATION: 96 % | DIASTOLIC BLOOD PRESSURE: 75 MMHG | HEART RATE: 77 BPM | TEMPERATURE: 98.2 F | BODY MASS INDEX: 25.78 KG/M2 | RESPIRATION RATE: 16 BRPM

## 2024-02-13 VITALS
TEMPERATURE: 98 F | RESPIRATION RATE: 16 BRPM | HEART RATE: 80 BPM | WEIGHT: 140.1 LBS | OXYGEN SATURATION: 96 % | BODY MASS INDEX: 25.62 KG/M2 | SYSTOLIC BLOOD PRESSURE: 120 MMHG | DIASTOLIC BLOOD PRESSURE: 63 MMHG

## 2024-02-13 DIAGNOSIS — I10 HYPERTENSION, UNSPECIFIED TYPE: ICD-10-CM

## 2024-02-13 DIAGNOSIS — C50.919 CARCINOMA OF BREAST METASTATIC TO BONE, UNSPECIFIED LATERALITY (HCC): Primary | ICD-10-CM

## 2024-02-13 DIAGNOSIS — Z17.1 MALIGNANT NEOPLASM OF OVERLAPPING SITES OF RIGHT BREAST IN FEMALE, ESTROGEN RECEPTOR NEGATIVE (HCC): Primary | ICD-10-CM

## 2024-02-13 DIAGNOSIS — C50.811 MALIGNANT NEOPLASM OF OVERLAPPING SITES OF RIGHT BREAST IN FEMALE, ESTROGEN RECEPTOR NEGATIVE (HCC): Primary | ICD-10-CM

## 2024-02-13 DIAGNOSIS — C79.51 CARCINOMA OF BREAST METASTATIC TO BONE, UNSPECIFIED LATERALITY (HCC): ICD-10-CM

## 2024-02-13 DIAGNOSIS — C50.919 CARCINOMA OF BREAST METASTATIC TO BONE, UNSPECIFIED LATERALITY (HCC): ICD-10-CM

## 2024-02-13 DIAGNOSIS — C79.51 CARCINOMA OF BREAST METASTATIC TO BONE, UNSPECIFIED LATERALITY (HCC): Primary | ICD-10-CM

## 2024-02-13 DIAGNOSIS — C50.919 MALIGNANT NEOPLASM OF BREAST IN FEMALE, ESTROGEN RECEPTOR NEGATIVE, UNSPECIFIED LATERALITY, UNSPECIFIED SITE OF BREAST (HCC): ICD-10-CM

## 2024-02-13 DIAGNOSIS — Z17.1 MALIGNANT NEOPLASM OF BREAST IN FEMALE, ESTROGEN RECEPTOR NEGATIVE, UNSPECIFIED LATERALITY, UNSPECIFIED SITE OF BREAST (HCC): ICD-10-CM

## 2024-02-13 DIAGNOSIS — Z51.11 ENCOUNTER FOR ANTINEOPLASTIC CHEMOTHERAPY: Primary | ICD-10-CM

## 2024-02-13 LAB
ALBUMIN SERPL-MCNC: 3.5 G/DL (ref 3.5–5)
ALBUMIN/GLOB SERPL: 0.8 (ref 1.1–2.2)
ALP SERPL-CCNC: 74 U/L (ref 45–117)
ALT SERPL-CCNC: 15 U/L (ref 12–78)
ANION GAP SERPL CALC-SCNC: 3 MMOL/L (ref 5–15)
AST SERPL-CCNC: 30 U/L (ref 15–37)
BASOPHILS # BLD: 0 K/UL (ref 0–0.1)
BASOPHILS NFR BLD: 0 % (ref 0–1)
BILIRUB SERPL-MCNC: 0.4 MG/DL (ref 0.2–1)
BUN SERPL-MCNC: 9 MG/DL (ref 6–20)
BUN/CREAT SERPL: 9 (ref 12–20)
CALCIUM SERPL-MCNC: 9.1 MG/DL (ref 8.5–10.1)
CHLORIDE SERPL-SCNC: 109 MMOL/L (ref 97–108)
CO2 SERPL-SCNC: 31 MMOL/L (ref 21–32)
CREAT SERPL-MCNC: 1.01 MG/DL (ref 0.55–1.02)
DIFFERENTIAL METHOD BLD: ABNORMAL
EOSINOPHIL # BLD: 0 K/UL (ref 0–0.4)
EOSINOPHIL NFR BLD: 0 % (ref 0–7)
ERYTHROCYTE [DISTWIDTH] IN BLOOD BY AUTOMATED COUNT: 18.9 % (ref 11.5–14.5)
GLOBULIN SER CALC-MCNC: 4.2 G/DL (ref 2–4)
GLUCOSE SERPL-MCNC: 130 MG/DL (ref 65–100)
HCT VFR BLD AUTO: 39.8 % (ref 35–47)
HGB BLD-MCNC: 13.2 G/DL (ref 11.5–16)
IMM GRANULOCYTES # BLD AUTO: 0.1 K/UL (ref 0–0.04)
IMM GRANULOCYTES NFR BLD AUTO: 1 % (ref 0–0.5)
LYMPHOCYTES # BLD: 1.4 K/UL (ref 0.8–3.5)
LYMPHOCYTES NFR BLD: 21 % (ref 12–49)
MCH RBC QN AUTO: 32 PG (ref 26–34)
MCHC RBC AUTO-ENTMCNC: 33.2 G/DL (ref 30–36.5)
MCV RBC AUTO: 96.4 FL (ref 80–99)
MONOCYTES # BLD: 0.5 K/UL (ref 0–1)
MONOCYTES NFR BLD: 7 % (ref 5–13)
NEUTS SEG # BLD: 4.9 K/UL (ref 1.8–8)
NEUTS SEG NFR BLD: 71 % (ref 32–75)
NRBC # BLD: 0 K/UL (ref 0–0.01)
NRBC BLD-RTO: 0 PER 100 WBC
PLATELET # BLD AUTO: 435 K/UL (ref 150–400)
PMV BLD AUTO: 7.9 FL (ref 8.9–12.9)
POTASSIUM SERPL-SCNC: 3.3 MMOL/L (ref 3.5–5.1)
PROT SERPL-MCNC: 7.7 G/DL (ref 6.4–8.2)
RBC # BLD AUTO: 4.13 M/UL (ref 3.8–5.2)
SODIUM SERPL-SCNC: 143 MMOL/L (ref 136–145)
WBC # BLD AUTO: 6.8 K/UL (ref 3.6–11)

## 2024-02-13 PROCEDURE — 80053 COMPREHEN METABOLIC PANEL: CPT

## 2024-02-13 PROCEDURE — 99215 OFFICE O/P EST HI 40 MIN: CPT | Performed by: INTERNAL MEDICINE

## 2024-02-13 PROCEDURE — 85025 COMPLETE CBC W/AUTO DIFF WBC: CPT

## 2024-02-13 PROCEDURE — 3074F SYST BP LT 130 MM HG: CPT | Performed by: INTERNAL MEDICINE

## 2024-02-13 PROCEDURE — 3078F DIAST BP <80 MM HG: CPT | Performed by: INTERNAL MEDICINE

## 2024-02-13 PROCEDURE — 36415 COLL VENOUS BLD VENIPUNCTURE: CPT

## 2024-02-13 RX ORDER — AMLODIPINE BESYLATE 5 MG/1
5 TABLET ORAL DAILY
Qty: 90 TABLET | Refills: 1 | Status: SHIPPED | OUTPATIENT
Start: 2024-02-13

## 2024-02-13 RX ORDER — PALONOSETRON 0.05 MG/ML
0.25 INJECTION, SOLUTION INTRAVENOUS ONCE
OUTPATIENT
Start: 2024-02-20 | End: 2024-02-20

## 2024-02-13 RX ORDER — EPINEPHRINE 1 MG/ML
0.3 INJECTION, SOLUTION, CONCENTRATE INTRAVENOUS PRN
OUTPATIENT
Start: 2024-02-20

## 2024-02-13 RX ORDER — ACETAMINOPHEN 325 MG/1
650 TABLET ORAL
OUTPATIENT
Start: 2024-02-13

## 2024-02-13 RX ORDER — SODIUM CHLORIDE 9 MG/ML
5-250 INJECTION, SOLUTION INTRAVENOUS PRN
OUTPATIENT
Start: 2024-02-20

## 2024-02-13 RX ORDER — EPINEPHRINE 1 MG/ML
0.3 INJECTION, SOLUTION INTRAMUSCULAR; SUBCUTANEOUS PRN
OUTPATIENT
Start: 2024-02-13

## 2024-02-13 RX ORDER — DEXTROSE MONOHYDRATE 50 MG/ML
5-250 INJECTION, SOLUTION INTRAVENOUS PRN
OUTPATIENT
Start: 2024-02-20

## 2024-02-13 RX ORDER — ONDANSETRON 2 MG/ML
8 INJECTION INTRAMUSCULAR; INTRAVENOUS
OUTPATIENT
Start: 2024-02-20

## 2024-02-13 RX ORDER — FAMOTIDINE 10 MG/ML
20 INJECTION, SOLUTION INTRAVENOUS
OUTPATIENT
Start: 2024-02-13

## 2024-02-13 RX ORDER — ALBUTEROL SULFATE 90 UG/1
4 AEROSOL, METERED RESPIRATORY (INHALATION) PRN
OUTPATIENT
Start: 2024-02-13

## 2024-02-13 RX ORDER — SODIUM CHLORIDE 9 MG/ML
25 INJECTION, SOLUTION INTRAVENOUS PRN
OUTPATIENT
Start: 2024-02-13

## 2024-02-13 RX ORDER — ONDANSETRON 2 MG/ML
8 INJECTION INTRAMUSCULAR; INTRAVENOUS
OUTPATIENT
Start: 2024-02-13

## 2024-02-13 RX ORDER — DIPHENHYDRAMINE HYDROCHLORIDE 50 MG/ML
50 INJECTION INTRAMUSCULAR; INTRAVENOUS
OUTPATIENT
Start: 2024-02-20

## 2024-02-13 RX ORDER — SODIUM CHLORIDE 0.9 % (FLUSH) 0.9 %
5-40 SYRINGE (ML) INJECTION PRN
OUTPATIENT
Start: 2024-02-13

## 2024-02-13 RX ORDER — SODIUM CHLORIDE 9 MG/ML
INJECTION, SOLUTION INTRAVENOUS CONTINUOUS
OUTPATIENT
Start: 2024-02-13

## 2024-02-13 RX ORDER — HEPARIN 100 UNIT/ML
500 SYRINGE INTRAVENOUS PRN
OUTPATIENT
Start: 2024-02-13

## 2024-02-13 RX ORDER — ALBUTEROL SULFATE 90 UG/1
4 AEROSOL, METERED RESPIRATORY (INHALATION) PRN
OUTPATIENT
Start: 2024-02-20

## 2024-02-13 RX ORDER — SODIUM CHLORIDE 9 MG/ML
INJECTION, SOLUTION INTRAVENOUS CONTINUOUS
OUTPATIENT
Start: 2024-02-20

## 2024-02-13 RX ORDER — MEPERIDINE HYDROCHLORIDE 25 MG/ML
12.5 INJECTION INTRAMUSCULAR; INTRAVENOUS; SUBCUTANEOUS PRN
OUTPATIENT
Start: 2024-02-20

## 2024-02-13 RX ORDER — DIPHENHYDRAMINE HYDROCHLORIDE 50 MG/ML
50 INJECTION INTRAMUSCULAR; INTRAVENOUS
OUTPATIENT
Start: 2024-02-13

## 2024-02-13 RX ORDER — HEPARIN 100 UNIT/ML
500 SYRINGE INTRAVENOUS PRN
OUTPATIENT
Start: 2024-02-20

## 2024-02-13 RX ORDER — SODIUM CHLORIDE 0.9 % (FLUSH) 0.9 %
5-40 SYRINGE (ML) INJECTION PRN
OUTPATIENT
Start: 2024-02-20

## 2024-02-13 RX ORDER — ACETAMINOPHEN 325 MG/1
650 TABLET ORAL
OUTPATIENT
Start: 2024-02-20

## 2024-02-13 NOTE — PROGRESS NOTES
Debra Moser is a 64 y.o. female here today for breast cancer follow up     1. Have you been to the ER, urgent care clinic since your last visit?  Hospitalized since your last visit?no    2. Have you seen or consulted any other health care providers outside of the [facility] since your last visit?  Include any pap smears or colon screening.     
seen on image 88 show maximum SUV of 2.6.   MEDIASTINUM AND CHEST LYMPH NODES:   *   No hypermetabolic lymphadenopathy in the chest.. Tip of the left chest port catheter is placed in the right atrium     ABDOMEN AND PELVIS   HEPATOBILIARY:   *  No focal abnormally increased trace uptake.   PANCREAS:   *  No focal abnormally increased trace uptake.     SPLEEN:   *   No focal abnormally increased trace uptake.   ADRENAL GLANDS:   *   No focal abnormally increased trace uptake.   KIDNEYS:   *   Physiologic trace excretion in both kidneys and therefore underlying lesion detection sensitivity is low.   PERITONEUM/ MESENTERY/BOWEL:   *   Physiologic tracer excretion in the bowel loops and therefore underlying lesion detection sensitivity is low. There is no bowel obstruction or ascites.   SUBDIAPHRAGMATIC LYMPH NODES:   *   No hypermetabolic lymphadenopathy in the scanned regions.   PELVIS:   *   No hypermetabolic pelvic mass lesion. No free fluid in the pelvis. Physiologic tracer accumulation in the bladder and therefore underlying lesion detection sensitivity is low.     BONES AND SOFT TISSUES:   *  Status post right mastectomy. Mild FDG uptake in the left breast with maximum SUV of 2.3 is seen.  No hypermetabolic focal lytic or sclerotic bone lesions in the scanned regions.   Bilateral gluteal subcutaneous fat stranding and tracer activity likely due to injections. Field of view truncation of CT images of the upper extremities limiting evaluation. Diffusely increased tracer activity in both shoulder joints right more than left and likely inflammatory in nature        PET 9/21/23  IMPRESSION:  Bilateral pulmonary infiltrates are mildly hypermetabolic,  underlying neoplasm is difficult to exclude. Minimally hypermetabolic old left  rib fracture. Diffuse uptake involving the right shoulder joint may be  inflammatory in nature. There is otherwise no PET avid evidence to suggest  metastatic disease.    CT C/A/P

## 2024-02-13 NOTE — PROGRESS NOTES
Pharmacy Note- Chemotherapy Education    Debra Moser is a  64 y.o.female  diagnosed with breast cancer here today for chemotherapy counseling. Ms. Lobito Moser is being treated with fam-trastuzumab deruxtecan.   Provided education on side effects and premedications.    Side effects of chemotherapy reviewed included s/s infection, anemia,, thrombocytopenia, nausea/vomiting, fatigue, hair loss/alopecia, diarrhea/constipation and rare but serious side effects of changes in the heart, changes in the lungs and changes in the liver function.    Patient given ways to manage these side effects and when to contact office.     [facility] Handout of medications provided to patient. Ms. Lobito Moser verbalized understanding of the information presented and all of the patient's questions were answered.    Chemotherapy/Immunotherapy education and consent discussed with the patient and the patient denied any questions or concerns.  A hard copy of the chemotherapy consent was offered to the patient and the patient declined (uploaded to [employer]).    Stephany Delaney, PharmD, BCPS

## 2024-02-20 ENCOUNTER — HOSPITAL ENCOUNTER (OUTPATIENT)
Facility: HOSPITAL | Age: 64
Setting detail: INFUSION SERIES
Discharge: HOME | End: 2024-02-20

## 2024-02-20 VITALS
WEIGHT: 141.5 LBS | DIASTOLIC BLOOD PRESSURE: 65 MMHG | SYSTOLIC BLOOD PRESSURE: 106 MMHG | HEIGHT: 62 IN | OXYGEN SATURATION: 94 % | TEMPERATURE: 97.9 F | HEART RATE: 80 BPM | RESPIRATION RATE: 16 BRPM | BODY MASS INDEX: 26.04 KG/M2

## 2024-02-20 DIAGNOSIS — Z51.11 ENCOUNTER FOR ANTINEOPLASTIC CHEMOTHERAPY: ICD-10-CM

## 2024-02-20 DIAGNOSIS — C50.919 CARCINOMA OF BREAST METASTATIC TO BONE, UNSPECIFIED LATERALITY (HCC): ICD-10-CM

## 2024-02-20 DIAGNOSIS — C50.919 MALIGNANT NEOPLASM OF BREAST IN FEMALE, ESTROGEN RECEPTOR NEGATIVE, UNSPECIFIED LATERALITY, UNSPECIFIED SITE OF BREAST (HCC): Primary | ICD-10-CM

## 2024-02-20 DIAGNOSIS — Z17.1 MALIGNANT NEOPLASM OF BREAST IN FEMALE, ESTROGEN RECEPTOR NEGATIVE, UNSPECIFIED LATERALITY, UNSPECIFIED SITE OF BREAST (HCC): Primary | ICD-10-CM

## 2024-02-20 DIAGNOSIS — C79.51 CARCINOMA OF BREAST METASTATIC TO BONE, UNSPECIFIED LATERALITY (HCC): ICD-10-CM

## 2024-02-20 LAB
ALBUMIN SERPL-MCNC: 3.3 G/DL (ref 3.5–5)
ALBUMIN/GLOB SERPL: 0.8 (ref 1.1–2.2)
ALP SERPL-CCNC: 74 U/L (ref 45–117)
ALT SERPL-CCNC: 12 U/L (ref 12–78)
ANION GAP SERPL CALC-SCNC: 4 MMOL/L (ref 5–15)
AST SERPL-CCNC: 35 U/L (ref 15–37)
BASOPHILS # BLD: 0.1 K/UL (ref 0–0.1)
BASOPHILS NFR BLD: 1 % (ref 0–1)
BILIRUB SERPL-MCNC: 0.6 MG/DL (ref 0.2–1)
BUN SERPL-MCNC: 8 MG/DL (ref 6–20)
BUN/CREAT SERPL: 13 (ref 12–20)
CALCIUM SERPL-MCNC: 8.8 MG/DL (ref 8.5–10.1)
CHLORIDE SERPL-SCNC: 108 MMOL/L (ref 97–108)
CO2 SERPL-SCNC: 29 MMOL/L (ref 21–32)
CREAT SERPL-MCNC: 0.64 MG/DL (ref 0.55–1.02)
DIFFERENTIAL METHOD BLD: ABNORMAL
EOSINOPHIL # BLD: 0 K/UL (ref 0–0.4)
EOSINOPHIL NFR BLD: 0 % (ref 0–7)
ERYTHROCYTE [DISTWIDTH] IN BLOOD BY AUTOMATED COUNT: 18.3 % (ref 11.5–14.5)
GLOBULIN SER CALC-MCNC: 3.9 G/DL (ref 2–4)
GLUCOSE SERPL-MCNC: 127 MG/DL (ref 65–100)
HCT VFR BLD AUTO: 39.4 % (ref 35–47)
HGB BLD-MCNC: 12.8 G/DL (ref 11.5–16)
IMM GRANULOCYTES # BLD AUTO: 0.1 K/UL (ref 0–0.04)
IMM GRANULOCYTES NFR BLD AUTO: 1 % (ref 0–0.5)
LYMPHOCYTES # BLD: 1 K/UL (ref 0.8–3.5)
LYMPHOCYTES NFR BLD: 15 % (ref 12–49)
MCH RBC QN AUTO: 31.6 PG (ref 26–34)
MCHC RBC AUTO-ENTMCNC: 32.5 G/DL (ref 30–36.5)
MCV RBC AUTO: 97.3 FL (ref 80–99)
MONOCYTES # BLD: 0.5 K/UL (ref 0–1)
MONOCYTES NFR BLD: 7 % (ref 5–13)
NEUTS SEG # BLD: 5.4 K/UL (ref 1.8–8)
NEUTS SEG NFR BLD: 76 % (ref 32–75)
NRBC # BLD: 0 K/UL (ref 0–0.01)
NRBC BLD-RTO: 0 PER 100 WBC
PLATELET # BLD AUTO: 250 K/UL (ref 150–400)
PMV BLD AUTO: 8.2 FL (ref 8.9–12.9)
POTASSIUM SERPL-SCNC: 3.2 MMOL/L (ref 3.5–5.1)
PROT SERPL-MCNC: 7.2 G/DL (ref 6.4–8.2)
RBC # BLD AUTO: 4.05 M/UL (ref 3.8–5.2)
SODIUM SERPL-SCNC: 141 MMOL/L (ref 136–145)
WBC # BLD AUTO: 7 K/UL (ref 3.6–11)

## 2024-02-20 PROCEDURE — 85025 COMPLETE CBC W/AUTO DIFF WBC: CPT

## 2024-02-20 PROCEDURE — 80053 COMPREHEN METABOLIC PANEL: CPT

## 2024-02-20 PROCEDURE — 6360000002 HC RX W HCPCS: Performed by: INTERNAL MEDICINE

## 2024-02-20 PROCEDURE — 2580000003 HC RX 258: Performed by: INTERNAL MEDICINE

## 2024-02-20 PROCEDURE — 96375 TX/PRO/DX INJ NEW DRUG ADDON: CPT

## 2024-02-20 PROCEDURE — 96413 CHEMO IV INFUSION 1 HR: CPT

## 2024-02-20 PROCEDURE — 6370000000 HC RX 637 (ALT 250 FOR IP): Performed by: NURSE PRACTITIONER

## 2024-02-20 PROCEDURE — 96367 TX/PROPH/DG ADDL SEQ IV INF: CPT

## 2024-02-20 RX ORDER — PALONOSETRON 0.05 MG/ML
0.25 INJECTION, SOLUTION INTRAVENOUS ONCE
Status: COMPLETED | OUTPATIENT
Start: 2024-02-20 | End: 2024-02-20

## 2024-02-20 RX ORDER — ALBUTEROL SULFATE 90 UG/1
4 AEROSOL, METERED RESPIRATORY (INHALATION) PRN
Status: DISCONTINUED | OUTPATIENT
Start: 2024-02-20 | End: 2024-02-21 | Stop reason: HOSPADM

## 2024-02-20 RX ORDER — EPINEPHRINE 1 MG/ML
0.3 INJECTION, SOLUTION INTRAMUSCULAR; SUBCUTANEOUS PRN
Status: DISCONTINUED | OUTPATIENT
Start: 2024-02-20 | End: 2024-02-21 | Stop reason: HOSPADM

## 2024-02-20 RX ORDER — DEXTROSE MONOHYDRATE 50 MG/ML
5-250 INJECTION, SOLUTION INTRAVENOUS PRN
Status: DISCONTINUED | OUTPATIENT
Start: 2024-02-20 | End: 2024-02-21 | Stop reason: HOSPADM

## 2024-02-20 RX ORDER — FAMOTIDINE 10 MG/ML
20 INJECTION, SOLUTION INTRAVENOUS
Status: DISCONTINUED | OUTPATIENT
Start: 2024-02-20 | End: 2024-02-21 | Stop reason: HOSPADM

## 2024-02-20 RX ORDER — MEPERIDINE HYDROCHLORIDE 25 MG/ML
12.5 INJECTION INTRAMUSCULAR; INTRAVENOUS; SUBCUTANEOUS PRN
Status: DISCONTINUED | OUTPATIENT
Start: 2024-02-20 | End: 2024-02-21 | Stop reason: HOSPADM

## 2024-02-20 RX ORDER — ACETAMINOPHEN 325 MG/1
650 TABLET ORAL
Status: DISCONTINUED | OUTPATIENT
Start: 2024-02-20 | End: 2024-02-21 | Stop reason: HOSPADM

## 2024-02-20 RX ORDER — SODIUM CHLORIDE 0.9 % (FLUSH) 0.9 %
5-40 SYRINGE (ML) INJECTION PRN
Status: DISCONTINUED | OUTPATIENT
Start: 2024-02-20 | End: 2024-02-21 | Stop reason: HOSPADM

## 2024-02-20 RX ORDER — ONDANSETRON 2 MG/ML
8 INJECTION INTRAMUSCULAR; INTRAVENOUS
Status: DISCONTINUED | OUTPATIENT
Start: 2024-02-20 | End: 2024-02-21 | Stop reason: HOSPADM

## 2024-02-20 RX ORDER — DIPHENHYDRAMINE HYDROCHLORIDE 50 MG/ML
50 INJECTION INTRAMUSCULAR; INTRAVENOUS
Status: DISCONTINUED | OUTPATIENT
Start: 2024-02-20 | End: 2024-02-21 | Stop reason: HOSPADM

## 2024-02-20 RX ORDER — SODIUM CHLORIDE 9 MG/ML
INJECTION, SOLUTION INTRAVENOUS CONTINUOUS
Status: DISCONTINUED | OUTPATIENT
Start: 2024-02-20 | End: 2024-02-21 | Stop reason: HOSPADM

## 2024-02-20 RX ORDER — POTASSIUM CHLORIDE 750 MG/1
40 TABLET, FILM COATED, EXTENDED RELEASE ORAL ONCE
Status: COMPLETED | OUTPATIENT
Start: 2024-02-20 | End: 2024-02-20

## 2024-02-20 RX ADMIN — DEXAMETHASONE SODIUM PHOSPHATE 12 MG: 4 INJECTION, SOLUTION INTRAMUSCULAR; INTRAVENOUS at 11:48

## 2024-02-20 RX ADMIN — PALONOSETRON HYDROCHLORIDE 0.25 MG: 0.25 INJECTION INTRAVENOUS at 11:45

## 2024-02-20 RX ADMIN — DEXTROSE MONOHYDRATE 25 ML/HR: 50 INJECTION, SOLUTION INTRAVENOUS at 11:44

## 2024-02-20 RX ADMIN — SODIUM CHLORIDE, PRESERVATIVE FREE 20 ML: 5 INJECTION INTRAVENOUS at 14:43

## 2024-02-20 RX ADMIN — FAM-TRASTUZUMAB DERUXTECAN-NXKI 346 MG: 100 INJECTION, POWDER, LYOPHILIZED, FOR SOLUTION INTRAVENOUS at 13:04

## 2024-02-20 RX ADMIN — POTASSIUM CHLORIDE 40 MEQ: 750 TABLET, FILM COATED, EXTENDED RELEASE ORAL at 14:33

## 2024-02-20 RX ADMIN — FOSAPREPITANT 150 MG: 150 INJECTION, POWDER, LYOPHILIZED, FOR SOLUTION INTRAVENOUS at 12:08

## 2024-02-20 ASSESSMENT — PAIN SCALES - GENERAL: PAINLEVEL_OUTOF10: 0

## 2024-02-20 NOTE — DISCHARGE INSTRUCTIONS
fam-trastuzumab deruxtecan  Augusto:  Enhertu  ¿Cuál es la información más importante que raven saber sobre fam-trastuzumab deruxtecan?  Fam-trastuzumab deruxtecan puede causar efectos secundarios serios en el corazón o los pulmones o que pueden amenazar cueto gregg. Llame a cueto médico de inmediato si usted tiene tos, cansancio, mareo, opresión en el pecho, sibilancia, latidos cardíacos irregulares, falta de aire al respirar nueva o que empeora.  Fam-trastuzumab deruxtecan puede debilitar (suprimir) el sistema inmunológico, y puede que sea propenso a juan c infección o sangrar más fácilmente. Informe a cueto médico si tiene moretones o sangrado inusuales, o signos de infección (fiebre, escalofríos, debilidad, síntomas de resfriado o gripe, enfermedad frecuente o recurrente).  Tanto los hombres dexter las mujeres que utilicen khushbu medicamento deberán utilizar un método anticonceptivo efectivo para prevenir el embarazo. El control de la natalidad debe continuar orquidea 7 meses después de la última dosis de fam-trastuzumab deruxtecan en las mujeres y 4 meses después en los hombres.  ¿Qué es fam-trastuzumab deruxtecan?  Fam-trastuzumab deruxtecan se usa en adultos para tratar el cáncer de mama HER2-positivo que se ha diseminado a otras partes del cuerpo (metástasis) o no se puede extirpar con cirugía.  Fam-trastuzumab deruxtecan también se usa en adultos que gorman recibido un tratamiento contra el cáncer de mama anti-HER2 para enfermedades metastásicas, o que tienen cáncer de mama que ha regresado dentro de los 6 meses después de finalizar el tratamiento para el cáncer de mama en estado iniciales.  Fam-trastuzumab deruxtecan también se usa en adultos para tratar un cierto tipo de cáncer de esófago HER2-positivo.  Fam-trastuzumab deruxtecan usualmente se administra después de otros tratamientos contra el cáncer no hayan funcionado o hayan dejado de funcionar.  Fam-trastuzumab deruxtecan puede también utilizarse para fines no  anormales de la función del hígado; o  pérdida del yoanna.  Esta lista no incluye todos los efectos secundarios y es posible que aparezcan otros síntomas. Llame a cueto médico para obtener consejos e información médica relacionada con estos efectos secundarios. También puede informar acerca de efectos secundarios llamando al FDA al 1-800-FDA-1088.  ¿Qué otros fármacos afectarán a fam-trastuzumab deruxtecan?  Otros fármacos pueden asimismo afectar a fam-trastuzumab deruxtecan, incluyendo medicamentos obtenidos con o sin receta, vitaminas y productos herbarios. Informe a cueto médico acerca del jamila de medicamentos que esté utilizando y cualquier medicamento que comience o deje de utilizar.  ¿Dónde puedo obtener más información?  Cueto farmacéutico puede ofrecerle más información acerca de fam-trastuzumab deruxtecan.  Recuerde, mantenga ésta y todas las otras medicinas fuera del alcance de los niños, no comparta nunca deborah medicinas con otros, y use khushbu medicamento solo para la condición por la que fue recetada.   Se todd hecho todo lo posible para que la información que proviene de Chaka Puckett ('[employer]') sea precisa, actual, y completa, michael no se hace garantía de viviana. La información sobre el medicamento incluida aquí puede tener nuevas recomendaciones. La información preparada por [employer] se ha creado para uso del profesional de la osiel y para el consumidor en los [place] y por lo cual [employer] no certifica que el uso fuera de los [place] sea apropiado, a menos que se mencione específicamente lo cual. La información de [employer] sobre drogas no sanciona drogas, ni diagnóstica al paciente o recomienda terapia. La información de [employer] sobre drogas sirve dexter juan c raquel de información diseñada para la ayuda del profesional de la osiel licenciado en el cuidado de deborah pacientes y/o para servir al consumidor que reciba khushbu servicio dexter un suplemento a, y no dexter sustituto de la competencia,

## 2024-02-20 NOTE — PROGRESS NOTES
[facility] - Chemotherapy Progress Note    Pt admit to [facility] for C1D1 Enhertu in stable condition. Assessment completed.  PAC with positive blood return.    Initial assessment today was completed by CORY Carcamo  No new concerns voiced.    Per Rochelle EASTMAN, ok to proceed without echo results.      service used for this visit.     VS  Vitals:    02/20/24 1000 02/20/24 1440   BP: 127/66 106/65   Pulse: 89 80   Resp: 16    Temp: 97.9 °F (36.6 °C)    TempSrc: Temporal    SpO2: 94%    Weight: 64.2 kg (141 lb 8 oz)    Height: 1.575 m (5' 2.01\")          Medications:  Medications Administered         dexAMETHasone (DECADRON) 12 mg in sodium chloride 0.9 % 50 mL IVPB Admin Date  02/20/2024 Action  New Bag Dose  12 mg Rate  200 mL/hr Route  IntraVENous Administered By  Cinthia Larkin RN        dextrose 5 % solution Admin Date  02/20/2024 Action  New Bag Dose  25 mL/hr Rate  25 mL/hr Route  IntraVENous Administered By  Cinthia Larkin RN        fam-trastuzumab deruxtec-nxki (ENHERTU) 346 mg in dextrose 5 % 100 mL chemo IVPB Admin Date  02/20/2024 Action  New Bag Dose  346 mg Rate  84.9 mL/hr Route  IntraVENous Administered By  Cinthia Larkin RN        fosaprepitant (EMEND) 150 mg in sodium chloride 0.9 % 150 mL IVPB Admin Date  02/20/2024 Action  New Bag Dose  150 mg Rate  450 mL/hr Route  IntraVENous Administered By  Cinthia Larkin RN        palonosetron (ALOXI) injection 0.25 mg Admin Date  02/20/2024 Action  Given Dose  0.25 mg Rate   Route  IntraVENous Administered By  Cinthia Larkin RN        potassium chloride (KLOR-CON) extended release tablet 40 mEq Admin Date  02/20/2024 Action  Given Dose  40 mEq Rate   Route  Oral Administered By  Cinthia Larkin RN        sodium chloride flush 0.9 % injection 5-40 mL Admin Date  02/20/2024 Action  Given Dose  20 mL Rate   Route  IntraVENous Administered By  Cinthia Larkin RN              Two

## 2024-03-05 RX ORDER — HEPARIN 100 UNIT/ML
500 SYRINGE INTRAVENOUS PRN
Status: CANCELLED | OUTPATIENT
Start: 2024-03-12

## 2024-03-05 RX ORDER — EPINEPHRINE 1 MG/ML
0.3 INJECTION, SOLUTION INTRAMUSCULAR; SUBCUTANEOUS PRN
Status: CANCELLED | OUTPATIENT
Start: 2024-03-12

## 2024-03-05 RX ORDER — FAMOTIDINE 10 MG/ML
20 INJECTION, SOLUTION INTRAVENOUS
Status: CANCELLED | OUTPATIENT
Start: 2024-03-12

## 2024-03-05 RX ORDER — ONDANSETRON 2 MG/ML
8 INJECTION INTRAMUSCULAR; INTRAVENOUS
Status: CANCELLED | OUTPATIENT
Start: 2024-03-12

## 2024-03-05 RX ORDER — DIPHENHYDRAMINE HYDROCHLORIDE 50 MG/ML
50 INJECTION INTRAMUSCULAR; INTRAVENOUS
Status: CANCELLED | OUTPATIENT
Start: 2024-03-12

## 2024-03-05 RX ORDER — ACETAMINOPHEN 325 MG/1
650 TABLET ORAL
Status: CANCELLED | OUTPATIENT
Start: 2024-03-12

## 2024-03-05 RX ORDER — MEPERIDINE HYDROCHLORIDE 25 MG/ML
12.5 INJECTION INTRAMUSCULAR; INTRAVENOUS; SUBCUTANEOUS PRN
Status: CANCELLED | OUTPATIENT
Start: 2024-03-12

## 2024-03-05 RX ORDER — ALBUTEROL SULFATE 90 UG/1
4 AEROSOL, METERED RESPIRATORY (INHALATION) PRN
Status: CANCELLED | OUTPATIENT
Start: 2024-03-12

## 2024-03-05 RX ORDER — SODIUM CHLORIDE 9 MG/ML
INJECTION, SOLUTION INTRAVENOUS CONTINUOUS
Status: CANCELLED | OUTPATIENT
Start: 2024-03-12

## 2024-03-12 ENCOUNTER — OFFICE VISIT (OUTPATIENT)
Age: 64
End: 2024-03-12

## 2024-03-12 ENCOUNTER — HOSPITAL ENCOUNTER (OUTPATIENT)
Facility: HOSPITAL | Age: 64
Setting detail: INFUSION SERIES
Discharge: HOME | End: 2024-03-12

## 2024-03-12 VITALS
BODY MASS INDEX: 25.67 KG/M2 | HEIGHT: 62 IN | HEART RATE: 77 BPM | WEIGHT: 139.5 LBS | DIASTOLIC BLOOD PRESSURE: 66 MMHG | OXYGEN SATURATION: 97 % | SYSTOLIC BLOOD PRESSURE: 119 MMHG | TEMPERATURE: 98.1 F | RESPIRATION RATE: 18 BRPM

## 2024-03-12 VITALS
OXYGEN SATURATION: 98 % | HEART RATE: 85 BPM | WEIGHT: 139 LBS | DIASTOLIC BLOOD PRESSURE: 65 MMHG | RESPIRATION RATE: 16 BRPM | SYSTOLIC BLOOD PRESSURE: 128 MMHG | TEMPERATURE: 98 F | BODY MASS INDEX: 25.42 KG/M2

## 2024-03-12 DIAGNOSIS — C50.919 MALIGNANT NEOPLASM OF BREAST IN FEMALE, ESTROGEN RECEPTOR NEGATIVE, UNSPECIFIED LATERALITY, UNSPECIFIED SITE OF BREAST (HCC): ICD-10-CM

## 2024-03-12 DIAGNOSIS — Z51.11 ENCOUNTER FOR ANTINEOPLASTIC CHEMOTHERAPY: ICD-10-CM

## 2024-03-12 DIAGNOSIS — C79.51 CARCINOMA OF BREAST METASTATIC TO BONE, UNSPECIFIED LATERALITY (HCC): ICD-10-CM

## 2024-03-12 DIAGNOSIS — C50.919 CARCINOMA OF BREAST METASTATIC TO BONE, UNSPECIFIED LATERALITY (HCC): ICD-10-CM

## 2024-03-12 DIAGNOSIS — Z51.11 ENCOUNTER FOR ANTINEOPLASTIC CHEMOTHERAPY: Primary | ICD-10-CM

## 2024-03-12 DIAGNOSIS — Z17.1 MALIGNANT NEOPLASM OF BREAST IN FEMALE, ESTROGEN RECEPTOR NEGATIVE, UNSPECIFIED LATERALITY, UNSPECIFIED SITE OF BREAST (HCC): ICD-10-CM

## 2024-03-12 DIAGNOSIS — C50.811 MALIGNANT NEOPLASM OF OVERLAPPING SITES OF RIGHT BREAST IN FEMALE, ESTROGEN RECEPTOR NEGATIVE (HCC): Primary | ICD-10-CM

## 2024-03-12 DIAGNOSIS — Z17.1 MALIGNANT NEOPLASM OF OVERLAPPING SITES OF RIGHT BREAST IN FEMALE, ESTROGEN RECEPTOR NEGATIVE (HCC): Primary | ICD-10-CM

## 2024-03-12 LAB
ALBUMIN SERPL-MCNC: 3.3 G/DL (ref 3.5–5)
ALBUMIN/GLOB SERPL: 0.8 (ref 1.1–2.2)
ALP SERPL-CCNC: 71 U/L (ref 45–117)
ALT SERPL-CCNC: 12 U/L (ref 12–78)
ANION GAP SERPL CALC-SCNC: 2 MMOL/L (ref 5–15)
AST SERPL-CCNC: 32 U/L (ref 15–37)
BASOPHILS # BLD: 0 K/UL (ref 0–0.1)
BASOPHILS NFR BLD: 1 % (ref 0–1)
BILIRUB SERPL-MCNC: 0.3 MG/DL (ref 0.2–1)
BUN SERPL-MCNC: 13 MG/DL (ref 6–20)
BUN/CREAT SERPL: 18 (ref 12–20)
CALCIUM SERPL-MCNC: 8.9 MG/DL (ref 8.5–10.1)
CHLORIDE SERPL-SCNC: 110 MMOL/L (ref 97–108)
CO2 SERPL-SCNC: 28 MMOL/L (ref 21–32)
CREAT SERPL-MCNC: 0.73 MG/DL (ref 0.55–1.02)
DIFFERENTIAL METHOD BLD: ABNORMAL
EOSINOPHIL # BLD: 0 K/UL (ref 0–0.4)
EOSINOPHIL NFR BLD: 1 % (ref 0–7)
ERYTHROCYTE [DISTWIDTH] IN BLOOD BY AUTOMATED COUNT: 16.7 % (ref 11.5–14.5)
GLOBULIN SER CALC-MCNC: 4 G/DL (ref 2–4)
GLUCOSE SERPL-MCNC: 141 MG/DL (ref 65–100)
HCT VFR BLD AUTO: 37.3 % (ref 35–47)
HGB BLD-MCNC: 12 G/DL (ref 11.5–16)
IMM GRANULOCYTES # BLD AUTO: 0.1 K/UL (ref 0–0.04)
IMM GRANULOCYTES NFR BLD AUTO: 1 % (ref 0–0.5)
LYMPHOCYTES # BLD: 1.2 K/UL (ref 0.8–3.5)
LYMPHOCYTES NFR BLD: 21 % (ref 12–49)
MCH RBC QN AUTO: 31.9 PG (ref 26–34)
MCHC RBC AUTO-ENTMCNC: 32.2 G/DL (ref 30–36.5)
MCV RBC AUTO: 99.2 FL (ref 80–99)
MONOCYTES # BLD: 0.5 K/UL (ref 0–1)
MONOCYTES NFR BLD: 8 % (ref 5–13)
NEUTS SEG # BLD: 4.2 K/UL (ref 1.8–8)
NEUTS SEG NFR BLD: 68 % (ref 32–75)
NRBC # BLD: 0 K/UL (ref 0–0.01)
NRBC BLD-RTO: 0 PER 100 WBC
PLATELET # BLD AUTO: 377 K/UL (ref 150–400)
PMV BLD AUTO: 8.3 FL (ref 8.9–12.9)
POTASSIUM SERPL-SCNC: 3.5 MMOL/L (ref 3.5–5.1)
PROT SERPL-MCNC: 7.3 G/DL (ref 6.4–8.2)
RBC # BLD AUTO: 3.76 M/UL (ref 3.8–5.2)
SODIUM SERPL-SCNC: 140 MMOL/L (ref 136–145)
WBC # BLD AUTO: 6 K/UL (ref 3.6–11)

## 2024-03-12 PROCEDURE — 96367 TX/PROPH/DG ADDL SEQ IV INF: CPT

## 2024-03-12 PROCEDURE — 96413 CHEMO IV INFUSION 1 HR: CPT

## 2024-03-12 PROCEDURE — 99215 OFFICE O/P EST HI 40 MIN: CPT | Performed by: NURSE PRACTITIONER

## 2024-03-12 PROCEDURE — 96375 TX/PRO/DX INJ NEW DRUG ADDON: CPT

## 2024-03-12 PROCEDURE — 6360000002 HC RX W HCPCS: Performed by: INTERNAL MEDICINE

## 2024-03-12 PROCEDURE — 80053 COMPREHEN METABOLIC PANEL: CPT

## 2024-03-12 PROCEDURE — 85025 COMPLETE CBC W/AUTO DIFF WBC: CPT

## 2024-03-12 PROCEDURE — 36415 COLL VENOUS BLD VENIPUNCTURE: CPT

## 2024-03-12 PROCEDURE — 6360000002 HC RX W HCPCS: Performed by: NURSE PRACTITIONER

## 2024-03-12 PROCEDURE — 2580000003 HC RX 258: Performed by: INTERNAL MEDICINE

## 2024-03-12 RX ORDER — ONDANSETRON 2 MG/ML
8 INJECTION INTRAMUSCULAR; INTRAVENOUS
OUTPATIENT
Start: 2024-05-28

## 2024-03-12 RX ORDER — PALONOSETRON 0.05 MG/ML
0.25 INJECTION, SOLUTION INTRAVENOUS ONCE
Status: COMPLETED | OUTPATIENT
Start: 2024-03-12 | End: 2024-03-12

## 2024-03-12 RX ORDER — SODIUM CHLORIDE 9 MG/ML
5-250 INJECTION, SOLUTION INTRAVENOUS PRN
OUTPATIENT
Start: 2024-05-28

## 2024-03-12 RX ORDER — DEXTROSE MONOHYDRATE 50 MG/ML
5-250 INJECTION, SOLUTION INTRAVENOUS PRN
Status: DISCONTINUED | OUTPATIENT
Start: 2024-03-12 | End: 2024-03-13 | Stop reason: HOSPADM

## 2024-03-12 RX ORDER — ACETAMINOPHEN 325 MG/1
650 TABLET ORAL
OUTPATIENT
Start: 2024-05-28

## 2024-03-12 RX ORDER — SODIUM CHLORIDE 0.9 % (FLUSH) 0.9 %
5-40 SYRINGE (ML) INJECTION PRN
OUTPATIENT
Start: 2024-05-28

## 2024-03-12 RX ORDER — EPINEPHRINE 1 MG/ML
0.3 INJECTION, SOLUTION INTRAMUSCULAR; SUBCUTANEOUS PRN
OUTPATIENT
Start: 2024-05-28

## 2024-03-12 RX ORDER — HEPARIN 100 UNIT/ML
500 SYRINGE INTRAVENOUS PRN
OUTPATIENT
Start: 2024-05-28

## 2024-03-12 RX ORDER — FAMOTIDINE 10 MG/ML
20 INJECTION, SOLUTION INTRAVENOUS
OUTPATIENT
Start: 2024-05-28

## 2024-03-12 RX ORDER — ZOLEDRONIC ACID 0.04 MG/ML
4 INJECTION, SOLUTION INTRAVENOUS ONCE
OUTPATIENT
Start: 2024-05-28 | End: 2024-05-28

## 2024-03-12 RX ORDER — ZOLEDRONIC ACID 0.04 MG/ML
4 INJECTION, SOLUTION INTRAVENOUS ONCE
Status: COMPLETED | OUTPATIENT
Start: 2024-03-12 | End: 2024-03-12

## 2024-03-12 RX ORDER — SODIUM CHLORIDE 9 MG/ML
INJECTION, SOLUTION INTRAVENOUS CONTINUOUS
OUTPATIENT
Start: 2024-05-28

## 2024-03-12 RX ORDER — ALBUTEROL SULFATE 90 UG/1
4 AEROSOL, METERED RESPIRATORY (INHALATION) PRN
OUTPATIENT
Start: 2024-05-28

## 2024-03-12 RX ORDER — SODIUM CHLORIDE 9 MG/ML
5-250 INJECTION, SOLUTION INTRAVENOUS PRN
Status: DISCONTINUED | OUTPATIENT
Start: 2024-03-12 | End: 2024-03-13 | Stop reason: HOSPADM

## 2024-03-12 RX ORDER — SODIUM CHLORIDE 0.9 % (FLUSH) 0.9 %
5-40 SYRINGE (ML) INJECTION PRN
Status: DISCONTINUED | OUTPATIENT
Start: 2024-03-12 | End: 2024-03-13 | Stop reason: HOSPADM

## 2024-03-12 RX ORDER — DIPHENHYDRAMINE HYDROCHLORIDE 50 MG/ML
50 INJECTION INTRAMUSCULAR; INTRAVENOUS
OUTPATIENT
Start: 2024-05-28

## 2024-03-12 RX ADMIN — PALONOSETRON HYDROCHLORIDE 0.25 MG: 0.25 INJECTION INTRAVENOUS at 12:09

## 2024-03-12 RX ADMIN — DEXAMETHASONE SODIUM PHOSPHATE 12 MG: 4 INJECTION, SOLUTION INTRAMUSCULAR; INTRAVENOUS at 12:12

## 2024-03-12 RX ADMIN — FOSAPREPITANT 150 MG: 150 INJECTION, POWDER, LYOPHILIZED, FOR SOLUTION INTRAVENOUS at 12:33

## 2024-03-12 RX ADMIN — FAM-TRASTUZUMAB DERUXTECAN-NXKI 346 MG: 100 INJECTION, POWDER, LYOPHILIZED, FOR SOLUTION INTRAVENOUS at 13:31

## 2024-03-12 RX ADMIN — ZOLEDRONIC ACID 4 MG: 0.04 INJECTION, SOLUTION INTRAVENOUS at 12:59

## 2024-03-12 RX ADMIN — DEXTROSE MONOHYDRATE 25 ML/HR: 50 INJECTION, SOLUTION INTRAVENOUS at 13:30

## 2024-03-12 ASSESSMENT — PAIN SCALES - GENERAL: PAINLEVEL_OUTOF10: 0

## 2024-03-12 NOTE — PROGRESS NOTES
[facility] Progress Note    Date: 2024    Name: Debra Moser    MRN: [id number]         : 1960    Ms. Lobito Moser Arrived ambulatory and in no distress for C2D1 of Enhertu/zometa Regimen.  Assessment was completed, no acute issues at this time, no new complaints voiced.  L chest wall port accessed without difficulty, labs drawn & sent for processing by CORY Talavera.      Patient proceed to appointment with Dr. Underwood.    Ms. Lobito Moser's vitals were reviewed.  Vitals:    24 1000   BP: 128/65   Pulse: 85   Resp: 18   Temp: 98.1 °F (36.7 °C)   SpO2: 94%       Lab results were obtained and reviewed.  Recent Results (from the past 12 hour(s))   CBC With Auto Differential    Collection Time: 24 10:14 AM   Result Value Ref Range    WBC 6.0 3.6 - 11.0 K/uL    RBC 3.76 (L) 3.80 - 5.20 M/uL    Hemoglobin 12.0 11.5 - 16.0 g/dL    Hematocrit 37.3 35.0 - 47.0 %    MCV 99.2 (H) 80.0 - 99.0 FL    MCH 31.9 26.0 - 34.0 PG    MCHC 32.2 30.0 - 36.5 g/dL    RDW 16.7 (H) 11.5 - 14.5 %    Platelets 377 150 - 400 K/uL    MPV 8.3 (L) 8.9 - 12.9 FL    Nucleated RBCs 0.0 0  WBC    nRBC 0.00 0.00 - 0.01 K/uL    Neutrophils % 68 32 - 75 %    Lymphocytes % 21 12 - 49 %    Monocytes % 8 5 - 13 %    Eosinophils % 1 0 - 7 %    Basophils % 1 0 - 1 %    Immature Granulocytes 1 (H) 0.0 - 0.5 %    Neutrophils Absolute 4.2 1.8 - 8.0 K/UL    Lymphocytes Absolute 1.2 0.8 - 3.5 K/UL    Monocytes Absolute 0.5 0.0 - 1.0 K/UL    Eosinophils Absolute 0.0 0.0 - 0.4 K/UL    Basophils Absolute 0.0 0.0 - 0.1 K/UL    Absolute Immature Granulocyte 0.1 (H) 0.00 - 0.04 K/UL    Differential Type AUTOMATED     Comprehensive metabolic panel    Collection Time: 24 10:14 AM   Result Value Ref Range    Sodium 140 136 - 145 mmol/L    Potassium 3.5 3.5 - 5.1 mmol/L    Chloride 110 (H) 97 - 108 mmol/L    CO2 28 21 - 32 mmol/L    Anion Gap 2 (L) 5 - 15 mmol/L    Glucose 141 (H) 65 - 100 mg/dL    BUN 13 6 - 20 MG/DL    Creatinine  appointment.    Dona Stroud RN  March 12, 2024

## 2024-03-12 NOTE — PROGRESS NOTES
Debra Moser is a 64 y.o. female here today to follow up for breast cancer    1. Have you been to the ER, urgent care clinic since your last visit?  Hospitalized since your last visit? no    2. Have you seen or consulted any other health care providers outside of the [facility] since your last visit?  Include any pap smears or colon screening.  no

## 2024-03-12 NOTE — PROGRESS NOTES
Cancer [place] at [facility]  [address], [address] [place] [address]  W: [phone]  F: [phone]      Reason for Visit:   Debra Moser is a 64 y.o. female who has transferred to me from [place].    Treatment History:   2016:  Original right stage 3 breast cancer, s/p mastectomy with 5 cm IDC, 3/19 LN, ER negative, [place] negative, HER 2 +, treated with chemotherapy (unknown) and XRT (in [facility])  9/2018:  recurrent breast cancer to her neck, tx with docetaxel q 3 weeks x 9, completed 4/12/2019 (PD) ([facility])  6/25/19 L ax LN FNA:  poorly differentiated carcinoma, c/w breast cancer, ER negative, [place] negative, HER 2 positive at IHC 3+  Tempus testing:  HER 2 overexpression  7/26/19 Trastuzumab/pertuzumab/paclitaxel until 7/2021 after 32 cycles (PD) ([place], [place])  XRT to right neck 1/2021  T-DM1 8/10/21- T-Dxd  T-Dxd 7/2022- 9/26/23   Hold 9/26/23 due to gr 1 ILD - discontinued due to continued concern for chronic ILD on CT  Right shoulder dislocation and infection 11/17/22  Zometa 9/5/2023 - current  Bronch, BAL, malignant cells present, compatible with carcinoma, not able to do receptors  Trastuzumab 11/21/23 - 1/23/24 (PD)  Tucatinib 11/13/23 - 2/13/24 (PD)  Capecitabine 11/23/23 - 2/13/24 (PD)  T-Dxd 2/20/24- current    History of Present Illness:   Recently moved to [place] to live with daughter.    Interval Hx: Patient presents today for follow up and treatment.  Reports no symptoms today. She feels her cough is stable.     Entire visit was done with Linda from [employer] [facility]    Past Medical History:   Diagnosis Date    Cancer (HCC)     breast on right    Diabetes mellitus (HCC)     Hypertension       Past Surgical History:   Procedure Laterality Date    BRONCHOSCOPY N/A 11/16/2023    BRONCHOSCOPY performed by Flower Esteves MD at [facility] ENDOSCOPY    BRONCHOSCOPY  11/16/2023    BRONCHOSCOPY ALVEOLAR LAVAGE performed by Flower Esteves MD at [facility] ENDOSCOPY

## 2024-03-25 RX ORDER — ALBUTEROL SULFATE 90 UG/1
4 AEROSOL, METERED RESPIRATORY (INHALATION) PRN
Status: CANCELLED | OUTPATIENT
Start: 2024-04-02

## 2024-03-25 RX ORDER — DIPHENHYDRAMINE HYDROCHLORIDE 50 MG/ML
50 INJECTION INTRAMUSCULAR; INTRAVENOUS
Status: CANCELLED | OUTPATIENT
Start: 2024-04-02

## 2024-03-25 RX ORDER — ACETAMINOPHEN 325 MG/1
650 TABLET ORAL
Status: CANCELLED | OUTPATIENT
Start: 2024-04-02

## 2024-03-25 RX ORDER — MEPERIDINE HYDROCHLORIDE 25 MG/ML
12.5 INJECTION INTRAMUSCULAR; INTRAVENOUS; SUBCUTANEOUS PRN
Status: CANCELLED | OUTPATIENT
Start: 2024-04-02

## 2024-03-25 RX ORDER — SODIUM CHLORIDE 9 MG/ML
INJECTION, SOLUTION INTRAVENOUS CONTINUOUS
Status: CANCELLED | OUTPATIENT
Start: 2024-04-02

## 2024-03-25 RX ORDER — HEPARIN 100 UNIT/ML
500 SYRINGE INTRAVENOUS PRN
Status: CANCELLED | OUTPATIENT
Start: 2024-04-02

## 2024-03-25 RX ORDER — EPINEPHRINE 1 MG/ML
0.3 INJECTION, SOLUTION INTRAMUSCULAR; SUBCUTANEOUS PRN
Status: CANCELLED | OUTPATIENT
Start: 2024-04-02

## 2024-03-25 RX ORDER — ONDANSETRON 2 MG/ML
8 INJECTION INTRAMUSCULAR; INTRAVENOUS
Status: CANCELLED | OUTPATIENT
Start: 2024-04-02

## 2024-03-25 RX ORDER — FAMOTIDINE 10 MG/ML
20 INJECTION, SOLUTION INTRAVENOUS
Status: CANCELLED | OUTPATIENT
Start: 2024-04-02

## 2024-03-28 ENCOUNTER — HOSPITAL ENCOUNTER (OUTPATIENT)
Facility: HOSPITAL | Age: 64
Discharge: HOME | End: 2024-03-28
Payer: SUBSIDIZED

## 2024-03-28 DIAGNOSIS — Z17.1 MALIGNANT NEOPLASM OF OVERLAPPING SITES OF RIGHT BREAST IN FEMALE, ESTROGEN RECEPTOR NEGATIVE (HCC): ICD-10-CM

## 2024-03-28 DIAGNOSIS — C50.811 MALIGNANT NEOPLASM OF OVERLAPPING SITES OF RIGHT BREAST IN FEMALE, ESTROGEN RECEPTOR NEGATIVE (HCC): ICD-10-CM

## 2024-03-28 DIAGNOSIS — C79.51 CARCINOMA OF BREAST METASTATIC TO BONE, UNSPECIFIED LATERALITY (HCC): ICD-10-CM

## 2024-03-28 DIAGNOSIS — C50.919 CARCINOMA OF BREAST METASTATIC TO BONE, UNSPECIFIED LATERALITY (HCC): ICD-10-CM

## 2024-03-28 PROCEDURE — 6360000004 HC RX CONTRAST MEDICATION: Performed by: NURSE PRACTITIONER

## 2024-03-28 PROCEDURE — 74177 CT ABD & PELVIS W/CONTRAST: CPT

## 2024-03-28 RX ADMIN — IOPAMIDOL 100 ML: 755 INJECTION, SOLUTION INTRAVENOUS at 08:03

## 2024-04-02 ENCOUNTER — HOSPITAL ENCOUNTER (OUTPATIENT)
Facility: HOSPITAL | Age: 64
Setting detail: INFUSION SERIES
Discharge: HOME | End: 2024-04-02

## 2024-04-02 ENCOUNTER — OFFICE VISIT (OUTPATIENT)
Age: 64
End: 2024-04-02

## 2024-04-02 VITALS
DIASTOLIC BLOOD PRESSURE: 81 MMHG | SYSTOLIC BLOOD PRESSURE: 134 MMHG | BODY MASS INDEX: 25.62 KG/M2 | WEIGHT: 139.2 LBS | HEART RATE: 85 BPM | OXYGEN SATURATION: 91 % | RESPIRATION RATE: 17 BRPM | TEMPERATURE: 97.3 F | HEIGHT: 62 IN

## 2024-04-02 VITALS
DIASTOLIC BLOOD PRESSURE: 68 MMHG | SYSTOLIC BLOOD PRESSURE: 132 MMHG | WEIGHT: 139 LBS | RESPIRATION RATE: 16 BRPM | HEART RATE: 85 BPM | BODY MASS INDEX: 25.42 KG/M2 | TEMPERATURE: 97.5 F | OXYGEN SATURATION: 96 %

## 2024-04-02 DIAGNOSIS — Z17.1 MALIGNANT NEOPLASM OF OVERLAPPING SITES OF RIGHT BREAST IN FEMALE, ESTROGEN RECEPTOR NEGATIVE (HCC): Primary | ICD-10-CM

## 2024-04-02 DIAGNOSIS — C50.919 CARCINOMA OF BREAST METASTATIC TO BONE, UNSPECIFIED LATERALITY (HCC): ICD-10-CM

## 2024-04-02 DIAGNOSIS — C50.919 MALIGNANT NEOPLASM OF BREAST IN FEMALE, ESTROGEN RECEPTOR NEGATIVE, UNSPECIFIED LATERALITY, UNSPECIFIED SITE OF BREAST (HCC): ICD-10-CM

## 2024-04-02 DIAGNOSIS — C79.51 CARCINOMA OF BREAST METASTATIC TO BONE, UNSPECIFIED LATERALITY (HCC): ICD-10-CM

## 2024-04-02 DIAGNOSIS — C50.811 MALIGNANT NEOPLASM OF OVERLAPPING SITES OF RIGHT BREAST IN FEMALE, ESTROGEN RECEPTOR NEGATIVE (HCC): Primary | ICD-10-CM

## 2024-04-02 DIAGNOSIS — Z51.11 ENCOUNTER FOR ANTINEOPLASTIC CHEMOTHERAPY: Primary | ICD-10-CM

## 2024-04-02 DIAGNOSIS — Z17.1 MALIGNANT NEOPLASM OF BREAST IN FEMALE, ESTROGEN RECEPTOR NEGATIVE, UNSPECIFIED LATERALITY, UNSPECIFIED SITE OF BREAST (HCC): ICD-10-CM

## 2024-04-02 LAB
ALBUMIN SERPL-MCNC: 3.2 G/DL (ref 3.5–5)
ALBUMIN/GLOB SERPL: 0.8 (ref 1.1–2.2)
ALP SERPL-CCNC: 73 U/L (ref 45–117)
ALT SERPL-CCNC: 11 U/L (ref 12–78)
ANION GAP SERPL CALC-SCNC: 3 MMOL/L (ref 5–15)
AST SERPL-CCNC: 41 U/L (ref 15–37)
BASOPHILS # BLD: 0.1 K/UL (ref 0–0.1)
BASOPHILS NFR BLD: 1 % (ref 0–1)
BILIRUB SERPL-MCNC: 0.3 MG/DL (ref 0.2–1)
BUN SERPL-MCNC: 7 MG/DL (ref 6–20)
BUN/CREAT SERPL: 11 (ref 12–20)
CALCIUM SERPL-MCNC: 9.1 MG/DL (ref 8.5–10.1)
CHLORIDE SERPL-SCNC: 107 MMOL/L (ref 97–108)
CO2 SERPL-SCNC: 29 MMOL/L (ref 21–32)
CREAT SERPL-MCNC: 0.63 MG/DL (ref 0.55–1.02)
DIFFERENTIAL METHOD BLD: ABNORMAL
EOSINOPHIL # BLD: 0 K/UL (ref 0–0.4)
EOSINOPHIL NFR BLD: 1 % (ref 0–7)
ERYTHROCYTE [DISTWIDTH] IN BLOOD BY AUTOMATED COUNT: 15.8 % (ref 11.5–14.5)
GLOBULIN SER CALC-MCNC: 4.2 G/DL (ref 2–4)
GLUCOSE SERPL-MCNC: 156 MG/DL (ref 65–100)
HCT VFR BLD AUTO: 36.7 % (ref 35–47)
HGB BLD-MCNC: 12.2 G/DL (ref 11.5–16)
IMM GRANULOCYTES # BLD AUTO: 0 K/UL (ref 0–0.04)
IMM GRANULOCYTES NFR BLD AUTO: 0 % (ref 0–0.5)
LYMPHOCYTES # BLD: 1.4 K/UL (ref 0.8–3.5)
LYMPHOCYTES NFR BLD: 32 % (ref 12–49)
MCH RBC QN AUTO: 32.2 PG (ref 26–34)
MCHC RBC AUTO-ENTMCNC: 33.2 G/DL (ref 30–36.5)
MCV RBC AUTO: 96.8 FL (ref 80–99)
MONOCYTES # BLD: 0.4 K/UL (ref 0–1)
MONOCYTES NFR BLD: 8 % (ref 5–13)
NEUTS SEG # BLD: 2.4 K/UL (ref 1.8–8)
NEUTS SEG NFR BLD: 58 % (ref 32–75)
NRBC # BLD: 0 K/UL (ref 0–0.01)
NRBC BLD-RTO: 0 PER 100 WBC
PLATELET # BLD AUTO: 354 K/UL (ref 150–400)
PMV BLD AUTO: 8.4 FL (ref 8.9–12.9)
POTASSIUM SERPL-SCNC: 3.1 MMOL/L (ref 3.5–5.1)
PROT SERPL-MCNC: 7.4 G/DL (ref 6.4–8.2)
RBC # BLD AUTO: 3.79 M/UL (ref 3.8–5.2)
SODIUM SERPL-SCNC: 139 MMOL/L (ref 136–145)
WBC # BLD AUTO: 4.2 K/UL (ref 3.6–11)

## 2024-04-02 PROCEDURE — 99215 OFFICE O/P EST HI 40 MIN: CPT | Performed by: INTERNAL MEDICINE

## 2024-04-02 PROCEDURE — 85025 COMPLETE CBC W/AUTO DIFF WBC: CPT

## 2024-04-02 PROCEDURE — 96367 TX/PROPH/DG ADDL SEQ IV INF: CPT

## 2024-04-02 PROCEDURE — 96413 CHEMO IV INFUSION 1 HR: CPT

## 2024-04-02 PROCEDURE — 6370000000 HC RX 637 (ALT 250 FOR IP): Performed by: INTERNAL MEDICINE

## 2024-04-02 PROCEDURE — 96375 TX/PRO/DX INJ NEW DRUG ADDON: CPT

## 2024-04-02 PROCEDURE — 6360000002 HC RX W HCPCS: Performed by: INTERNAL MEDICINE

## 2024-04-02 PROCEDURE — 80053 COMPREHEN METABOLIC PANEL: CPT

## 2024-04-02 PROCEDURE — 2580000003 HC RX 258: Performed by: INTERNAL MEDICINE

## 2024-04-02 RX ORDER — SODIUM CHLORIDE 9 MG/ML
5-250 INJECTION, SOLUTION INTRAVENOUS PRN
Status: DISCONTINUED | OUTPATIENT
Start: 2024-04-02 | End: 2024-04-03 | Stop reason: HOSPADM

## 2024-04-02 RX ORDER — DEXTROSE MONOHYDRATE 50 MG/ML
5-250 INJECTION, SOLUTION INTRAVENOUS PRN
Status: DISCONTINUED | OUTPATIENT
Start: 2024-04-02 | End: 2024-04-03 | Stop reason: HOSPADM

## 2024-04-02 RX ORDER — POTASSIUM CHLORIDE 750 MG/1
40 TABLET, FILM COATED, EXTENDED RELEASE ORAL
Status: COMPLETED | OUTPATIENT
Start: 2024-04-02 | End: 2024-04-02

## 2024-04-02 RX ORDER — SODIUM CHLORIDE 0.9 % (FLUSH) 0.9 %
5-40 SYRINGE (ML) INJECTION PRN
Status: DISCONTINUED | OUTPATIENT
Start: 2024-04-02 | End: 2024-04-03 | Stop reason: HOSPADM

## 2024-04-02 RX ORDER — PALONOSETRON 0.05 MG/ML
0.25 INJECTION, SOLUTION INTRAVENOUS ONCE
Status: COMPLETED | OUTPATIENT
Start: 2024-04-02 | End: 2024-04-02

## 2024-04-02 RX ORDER — BENZONATATE 100 MG/1
100 CAPSULE ORAL 3 TIMES DAILY PRN
Qty: 30 CAPSULE | Refills: 3 | Status: SHIPPED | OUTPATIENT
Start: 2024-04-02 | End: 2024-05-12

## 2024-04-02 RX ADMIN — PALONOSETRON 0.25 MG: 0.05 INJECTION, SOLUTION INTRAVENOUS at 11:56

## 2024-04-02 RX ADMIN — DEXAMETHASONE SODIUM PHOSPHATE 12 MG: 4 INJECTION, SOLUTION INTRAMUSCULAR; INTRAVENOUS at 11:59

## 2024-04-02 RX ADMIN — DEXTROSE MONOHYDRATE 25 ML/HR: 50 INJECTION, SOLUTION INTRAVENOUS at 13:33

## 2024-04-02 RX ADMIN — SODIUM CHLORIDE 25 ML/HR: 9 INJECTION, SOLUTION INTRAVENOUS at 11:55

## 2024-04-02 RX ADMIN — SODIUM CHLORIDE, PRESERVATIVE FREE 20 ML: 5 INJECTION INTRAVENOUS at 14:14

## 2024-04-02 RX ADMIN — FOSAPREPITANT 150 MG: 150 INJECTION, POWDER, LYOPHILIZED, FOR SOLUTION INTRAVENOUS at 12:25

## 2024-04-02 RX ADMIN — POTASSIUM CHLORIDE 40 MEQ: 750 TABLET, FILM COATED, EXTENDED RELEASE ORAL at 13:40

## 2024-04-02 RX ADMIN — POTASSIUM CHLORIDE 40 MEQ: 750 TABLET, FILM COATED, EXTENDED RELEASE ORAL at 11:55

## 2024-04-02 RX ADMIN — FAM-TRASTUZUMAB DERUXTECAN-NXKI 346 MG: 100 INJECTION, POWDER, LYOPHILIZED, FOR SOLUTION INTRAVENOUS at 13:39

## 2024-04-02 ASSESSMENT — PAIN SCALES - GENERAL: PAINLEVEL_OUTOF10: 0

## 2024-04-02 NOTE — PROGRESS NOTES
[facility] Chemo Progress Note    Date: April 2, 2024        1000: Ms. Lobito Moser Arrived to [facility] for  Enhertu C3 D1 ambulatory in stable condition.  Assessment was completed and port accessed by Paula Lee RN. Labs drawn and sent for processing. Went to provider appointment with Medical Oncology. Returned from provider appointment. Labs reviewed. Criteria for treatment was met.    Ms. Lobito Moser's vitals were reviewed.  Patient Vitals for the past 12 hrs:   Temp Pulse Resp BP SpO2   04/02/24 1408 -- -- -- 134/81 --   04/02/24 1024 97.3 °F (36.3 °C) 85 17 132/68 91 %         Lab results were obtained and reviewed.  Recent Results (from the past 12 hour(s))   CBC With Auto Differential    Collection Time: 04/02/24 10:41 AM   Result Value Ref Range    WBC 4.2 3.6 - 11.0 K/uL    RBC 3.79 (L) 3.80 - 5.20 M/uL    Hemoglobin 12.2 11.5 - 16.0 g/dL    Hematocrit 36.7 35.0 - 47.0 %    MCV 96.8 80.0 - 99.0 FL    MCH 32.2 26.0 - 34.0 PG    MCHC 33.2 30.0 - 36.5 g/dL    RDW 15.8 (H) 11.5 - 14.5 %    Platelets 354 150 - 400 K/uL    MPV 8.4 (L) 8.9 - 12.9 FL    Nucleated RBCs 0.0 0  WBC    nRBC 0.00 0.00 - 0.01 K/uL    Neutrophils % 58 32 - 75 %    Lymphocytes % 32 12 - 49 %    Monocytes % 8 5 - 13 %    Eosinophils % 1 0 - 7 %    Basophils % 1 0 - 1 %    Immature Granulocytes 0 0.0 - 0.5 %    Neutrophils Absolute 2.4 1.8 - 8.0 K/UL    Lymphocytes Absolute 1.4 0.8 - 3.5 K/UL    Monocytes Absolute 0.4 0.0 - 1.0 K/UL    Eosinophils Absolute 0.0 0.0 - 0.4 K/UL    Basophils Absolute 0.1 0.0 - 0.1 K/UL    Absolute Immature Granulocyte 0.0 0.00 - 0.04 K/UL    Differential Type AUTOMATED     Comprehensive metabolic panel    Collection Time: 04/02/24 10:41 AM   Result Value Ref Range    Sodium 139 136 - 145 mmol/L    Potassium 3.1 (L) 3.5 - 5.1 mmol/L    Chloride 107 97 - 108 mmol/L    CO2 29 21 - 32 mmol/L    Anion Gap 3 (L) 5 - 15 mmol/L    Glucose 156 (H) 65 - 100 mg/dL    BUN 7 6 - 20 MG/DL    Creatinine 0.63 0.55 - 1.02  MG/DL    Bun/Cre Ratio 11 (L) 12 - 20      Est, Glom Filt Rate >90 >60 ml/min/1.73m2    Calcium 9.1 8.5 - 10.1 MG/DL    Total Bilirubin 0.3 0.2 - 1.0 MG/DL    ALT 11 (L) 12 - 78 U/L    AST 41 (H) 15 - 37 U/L    Alk Phosphatase 73 45 - 117 U/L    Total Protein 7.4 6.4 - 8.2 g/dL    Albumin 3.2 (L) 3.5 - 5.0 g/dL    Globulin 4.2 (H) 2.0 - 4.0 g/dL    Albumin/Globulin Ratio 0.8 (L) 1.1 - 2.2           Pre-medications  were administered as ordered and chemotherapy was initiated.  Medications Administered         0.9 % sodium chloride infusion Admin Date  04/02/2024 Action  New Bag Dose  25 mL/hr Rate  25 mL/hr Route  IntraVENous Administered By  Paula Lee RN        dexAMETHasone (DECADRON) 12 mg in sodium chloride 0.9 % 50 mL IVPB Admin Date  04/02/2024 Action  New Bag Dose  12 mg Rate  200 mL/hr Route  IntraVENous Administered By  Paula Lee RN        dextrose 5 % solution Admin Date  04/02/2024 Action  New Bag Dose  25 mL/hr Rate  25 mL/hr Route  IntraVENous Administered By  Paula Lee RN        fam-trastuzumab deruxtec-nxki (ENHERTU) 346 mg in dextrose 5 % 100 mL chemo IVPB Admin Date  04/02/2024 Action  New Bag Dose  346 mg Rate  254.6 mL/hr Route  IntraVENous Administered By  Paula Lee RN        fosaprepitant (EMEND) 150 mg in sodium chloride 0.9 % 150 mL IVPB Admin Date  04/02/2024 Action  New Bag Dose  150 mg Rate  450 mL/hr Route  IntraVENous Administered By  Paula Lee RN        palonosetron (ALOXI) injection 0.25 mg Admin Date  04/02/2024 Action  Given Dose  0.25 mg Rate   Route  IntraVENous Administered By  Paula Lee RN        potassium chloride (KLOR-CON) extended release tablet 40 mEq Admin Date  04/02/2024 Action  Given Dose  40 mEq Rate   Route  Oral Administered By  Paula Lee RN        potassium chloride (KLOR-CON) extended release tablet 40 mEq Admin Date  04/02/2024 Action  Given Dose  40 mEq Rate   Route  Oral Administered By  Paula Lee, RN

## 2024-04-02 NOTE — PROGRESS NOTES
Debra Moser is a 64 y.o. female here today to follow up for breast cancer    1. Have you been to the ER, urgent care clinic since your last visit?  Hospitalized since your last visit?no    2. Have you seen or consulted any other health care providers outside of the [facility] since your last visit?  Include any pap smears or colon screening. no    
disease in the medial right  upper lobe as well as diffuse nodularity affecting the right upper, middle, and  lower lungs as well as the left lower lobe. The pattern of airspace disease is  consistent with lymphangitic spread of malignancy. No significant change in  distribution or severity of the pulmonary disease.     LIVER: Hypodense lesion of the dome of the liver measures 2.4 x 2.0 cm,  previously 2.4 x 1.8 cm. Inferior to this is a stable linear subcapsular  hypodense focus. No new liver lesions.  BILIARY TREE: Gallbladder is within normal limits. CBD is not dilated.  SPLEEN: within normal limits.  PANCREAS: No mass or ductal dilatation.  ADRENALS: Unremarkable.  KIDNEYS: No mass, calculus, or hydronephrosis.  STOMACH: Unremarkable.  SMALL BOWEL: No dilatation or wall thickening.  COLON: No dilatation or wall thickening.  APPENDIX: Normal  PERITONEUM: No ascites or pneumoperitoneum.  RETROPERITONEUM: No lymphadenopathy or aortic aneurysm.  REPRODUCTIVE ORGANS: Hysterectomy  URINARY BLADDER: No mass or calculus.  BONES: No destructive bone lesion.  ABDOMINAL WALL: Fat-containing umbilical hernia.  ADDITIONAL COMMENTS: N/A     IMPRESSION:  1.  Stable exam with regards to interstitial pulmonary metastases.  2.  Increased right pleural effusion, still small.  3.  Stable liver lesions at the dome of the liver.  4.  No new metastases.  5.  No osseous metastatic disease    Records reviewed and summarized above.  Pathology report(s) reviewed above.  Radiology report(s) reviewed above.      Assessment/plan:   1.  Metastatic recurrent right breast cancer, ER negative, [place] negative, HER 2 positive:      Mets to neck LN, hilar LN, left ax LN, liver, lung, bones    Discussed that while this is treatable, it is not curable, goal of therapy is palliative.      Scans on 3/29/24 show SD, reviewed with pt.    BAL in Nov 2023 suggests disease, no evidence of ILD.    Discussed side effects on T-Dxd 5.4 mg/kg IV including but not

## 2024-04-16 RX ORDER — ONDANSETRON 2 MG/ML
8 INJECTION INTRAMUSCULAR; INTRAVENOUS
Status: CANCELLED | OUTPATIENT
Start: 2024-04-23

## 2024-04-16 RX ORDER — SODIUM CHLORIDE 9 MG/ML
INJECTION, SOLUTION INTRAVENOUS CONTINUOUS
Status: CANCELLED | OUTPATIENT
Start: 2024-04-23

## 2024-04-16 RX ORDER — FAMOTIDINE 10 MG/ML
20 INJECTION, SOLUTION INTRAVENOUS
Status: CANCELLED | OUTPATIENT
Start: 2024-04-23

## 2024-04-16 RX ORDER — EPINEPHRINE 1 MG/ML
0.3 INJECTION, SOLUTION INTRAMUSCULAR; SUBCUTANEOUS PRN
Status: CANCELLED | OUTPATIENT
Start: 2024-04-23

## 2024-04-16 RX ORDER — HEPARIN 100 UNIT/ML
500 SYRINGE INTRAVENOUS PRN
Status: CANCELLED | OUTPATIENT
Start: 2024-04-23

## 2024-04-16 RX ORDER — MEPERIDINE HYDROCHLORIDE 25 MG/ML
12.5 INJECTION INTRAMUSCULAR; INTRAVENOUS; SUBCUTANEOUS PRN
Status: CANCELLED | OUTPATIENT
Start: 2024-04-23

## 2024-04-16 RX ORDER — SODIUM CHLORIDE 9 MG/ML
5-250 INJECTION, SOLUTION INTRAVENOUS PRN
Status: CANCELLED | OUTPATIENT
Start: 2024-04-23

## 2024-04-16 RX ORDER — DIPHENHYDRAMINE HYDROCHLORIDE 50 MG/ML
50 INJECTION INTRAMUSCULAR; INTRAVENOUS
Status: CANCELLED | OUTPATIENT
Start: 2024-04-23

## 2024-04-16 RX ORDER — ACETAMINOPHEN 325 MG/1
650 TABLET ORAL
Status: CANCELLED | OUTPATIENT
Start: 2024-04-23

## 2024-04-16 RX ORDER — ALBUTEROL SULFATE 90 UG/1
4 AEROSOL, METERED RESPIRATORY (INHALATION) PRN
Status: CANCELLED | OUTPATIENT
Start: 2024-04-23

## 2024-04-23 ENCOUNTER — HOSPITAL ENCOUNTER (OUTPATIENT)
Facility: HOSPITAL | Age: 64
Setting detail: INFUSION SERIES
Discharge: HOME | End: 2024-04-23

## 2024-04-23 ENCOUNTER — OFFICE VISIT (OUTPATIENT)
Age: 64
End: 2024-04-23

## 2024-04-23 VITALS
TEMPERATURE: 98 F | HEART RATE: 86 BPM | WEIGHT: 135 LBS | RESPIRATION RATE: 16 BRPM | SYSTOLIC BLOOD PRESSURE: 110 MMHG | BODY MASS INDEX: 24.69 KG/M2 | OXYGEN SATURATION: 98 % | DIASTOLIC BLOOD PRESSURE: 70 MMHG

## 2024-04-23 VITALS
OXYGEN SATURATION: 93 % | HEIGHT: 62 IN | BODY MASS INDEX: 24.92 KG/M2 | TEMPERATURE: 98.1 F | HEART RATE: 83 BPM | SYSTOLIC BLOOD PRESSURE: 110 MMHG | DIASTOLIC BLOOD PRESSURE: 61 MMHG | WEIGHT: 135.4 LBS | RESPIRATION RATE: 17 BRPM

## 2024-04-23 DIAGNOSIS — K13.79 MOUTH PAIN: ICD-10-CM

## 2024-04-23 DIAGNOSIS — R11.2 CHEMOTHERAPY INDUCED NAUSEA AND VOMITING: ICD-10-CM

## 2024-04-23 DIAGNOSIS — Z17.1 MALIGNANT NEOPLASM OF OVERLAPPING SITES OF RIGHT BREAST IN FEMALE, ESTROGEN RECEPTOR NEGATIVE (HCC): Primary | ICD-10-CM

## 2024-04-23 DIAGNOSIS — C50.811 MALIGNANT NEOPLASM OF OVERLAPPING SITES OF RIGHT BREAST IN FEMALE, ESTROGEN RECEPTOR NEGATIVE (HCC): Primary | ICD-10-CM

## 2024-04-23 DIAGNOSIS — Z17.1 MALIGNANT NEOPLASM OF BREAST IN FEMALE, ESTROGEN RECEPTOR NEGATIVE, UNSPECIFIED LATERALITY, UNSPECIFIED SITE OF BREAST (HCC): ICD-10-CM

## 2024-04-23 DIAGNOSIS — C50.919 CARCINOMA OF BREAST METASTATIC TO BONE, UNSPECIFIED LATERALITY (HCC): ICD-10-CM

## 2024-04-23 DIAGNOSIS — C50.919 MALIGNANT NEOPLASM OF BREAST IN FEMALE, ESTROGEN RECEPTOR NEGATIVE, UNSPECIFIED LATERALITY, UNSPECIFIED SITE OF BREAST (HCC): ICD-10-CM

## 2024-04-23 DIAGNOSIS — C79.51 CARCINOMA OF BREAST METASTATIC TO BONE, UNSPECIFIED LATERALITY (HCC): ICD-10-CM

## 2024-04-23 DIAGNOSIS — R05.3 CHRONIC COUGH: ICD-10-CM

## 2024-04-23 DIAGNOSIS — Z51.11 ENCOUNTER FOR ANTINEOPLASTIC CHEMOTHERAPY: Primary | ICD-10-CM

## 2024-04-23 DIAGNOSIS — T45.1X5A CHEMOTHERAPY INDUCED NAUSEA AND VOMITING: ICD-10-CM

## 2024-04-23 DIAGNOSIS — Z51.11 ENCOUNTER FOR ANTINEOPLASTIC CHEMOTHERAPY: ICD-10-CM

## 2024-04-23 DIAGNOSIS — R51.9 NONINTRACTABLE HEADACHE, UNSPECIFIED CHRONICITY PATTERN, UNSPECIFIED HEADACHE TYPE: ICD-10-CM

## 2024-04-23 LAB
ALBUMIN SERPL-MCNC: 2.7 G/DL (ref 3.5–5)
ALBUMIN/GLOB SERPL: 0.6 (ref 1.1–2.2)
ALP SERPL-CCNC: 99 U/L (ref 45–117)
ALT SERPL-CCNC: 12 U/L (ref 12–78)
ANION GAP SERPL CALC-SCNC: 3 MMOL/L (ref 5–15)
AST SERPL-CCNC: 37 U/L (ref 15–37)
BASO+EOS+MONOS # BLD AUTO: 0.5 K/UL (ref 0.2–1.2)
BASO+EOS+MONOS NFR BLD AUTO: 8 % (ref 3.2–16.9)
BILIRUB SERPL-MCNC: 0.7 MG/DL (ref 0.2–1)
BUN SERPL-MCNC: 6 MG/DL (ref 6–20)
BUN/CREAT SERPL: 9 (ref 12–20)
CALCIUM SERPL-MCNC: 9.2 MG/DL (ref 8.5–10.1)
CHLORIDE SERPL-SCNC: 106 MMOL/L (ref 97–108)
CO2 SERPL-SCNC: 31 MMOL/L (ref 21–32)
CREAT SERPL-MCNC: 0.68 MG/DL (ref 0.55–1.02)
DIFFERENTIAL METHOD BLD: ABNORMAL
ERYTHROCYTE [DISTWIDTH] IN BLOOD BY AUTOMATED COUNT: 16.2 % (ref 11.8–15.8)
GLOBULIN SER CALC-MCNC: 4.8 G/DL (ref 2–4)
GLUCOSE SERPL-MCNC: 174 MG/DL (ref 65–100)
HCT VFR BLD AUTO: 33.8 % (ref 35–47)
HGB BLD-MCNC: 10.7 G/DL (ref 11.5–16)
LYMPHOCYTES # BLD: 1 K/UL (ref 0.8–3.5)
LYMPHOCYTES NFR BLD: 16 % (ref 12–49)
MAGNESIUM SERPL-MCNC: 2.3 MG/DL (ref 1.6–2.4)
MCH RBC QN AUTO: 31.2 PG (ref 26–34)
MCHC RBC AUTO-ENTMCNC: 31.7 G/DL (ref 30–36.5)
MCV RBC AUTO: 98.5 FL (ref 80–99)
NEUTS SEG # BLD: 4.7 K/UL (ref 1.8–8)
NEUTS SEG NFR BLD: 76 % (ref 32–75)
PLATELET # BLD AUTO: 430 K/UL (ref 150–400)
POTASSIUM SERPL-SCNC: 3.4 MMOL/L (ref 3.5–5.1)
PROT SERPL-MCNC: 7.5 G/DL (ref 6.4–8.2)
RBC # BLD AUTO: 3.43 M/UL (ref 3.8–5.2)
SODIUM SERPL-SCNC: 140 MMOL/L (ref 136–145)
WBC # BLD AUTO: 6.2 K/UL (ref 3.6–11)

## 2024-04-23 PROCEDURE — 85025 COMPLETE CBC W/AUTO DIFF WBC: CPT

## 2024-04-23 PROCEDURE — 96375 TX/PRO/DX INJ NEW DRUG ADDON: CPT

## 2024-04-23 PROCEDURE — 6370000000 HC RX 637 (ALT 250 FOR IP): Performed by: NURSE PRACTITIONER

## 2024-04-23 PROCEDURE — 2580000003 HC RX 258: Performed by: INTERNAL MEDICINE

## 2024-04-23 PROCEDURE — 96367 TX/PROPH/DG ADDL SEQ IV INF: CPT

## 2024-04-23 PROCEDURE — 96413 CHEMO IV INFUSION 1 HR: CPT

## 2024-04-23 PROCEDURE — 83735 ASSAY OF MAGNESIUM: CPT

## 2024-04-23 PROCEDURE — 99215 OFFICE O/P EST HI 40 MIN: CPT | Performed by: NURSE PRACTITIONER

## 2024-04-23 PROCEDURE — 36415 COLL VENOUS BLD VENIPUNCTURE: CPT

## 2024-04-23 PROCEDURE — 80053 COMPREHEN METABOLIC PANEL: CPT

## 2024-04-23 PROCEDURE — 6360000002 HC RX W HCPCS: Performed by: INTERNAL MEDICINE

## 2024-04-23 RX ORDER — PALONOSETRON 0.05 MG/ML
0.25 INJECTION, SOLUTION INTRAVENOUS ONCE
Status: COMPLETED | OUTPATIENT
Start: 2024-04-23 | End: 2024-04-23

## 2024-04-23 RX ORDER — LIDOCAINE HYDROCHLORIDE 20 MG/ML
15 SOLUTION OROPHARYNGEAL
Qty: 600 ML | Refills: 0 | Status: SHIPPED | OUTPATIENT
Start: 2024-04-23

## 2024-04-23 RX ORDER — OLANZAPINE 2.5 MG/1
TABLET, FILM COATED ORAL
Qty: 60 TABLET | Refills: 3 | Status: SHIPPED | OUTPATIENT
Start: 2024-04-23

## 2024-04-23 RX ORDER — POTASSIUM CHLORIDE 750 MG/1
40 TABLET, FILM COATED, EXTENDED RELEASE ORAL ONCE
Status: COMPLETED | OUTPATIENT
Start: 2024-04-23 | End: 2024-04-23

## 2024-04-23 RX ORDER — BENZONATATE 100 MG/1
100 CAPSULE ORAL 3 TIMES DAILY PRN
Qty: 90 CAPSULE | Refills: 3 | Status: SHIPPED | OUTPATIENT
Start: 2024-04-23 | End: 2024-08-21

## 2024-04-23 RX ORDER — SODIUM CHLORIDE 0.9 % (FLUSH) 0.9 %
5-40 SYRINGE (ML) INJECTION PRN
Status: DISCONTINUED | OUTPATIENT
Start: 2024-04-23 | End: 2024-04-24 | Stop reason: HOSPADM

## 2024-04-23 RX ORDER — DEXTROSE MONOHYDRATE 50 MG/ML
5-250 INJECTION, SOLUTION INTRAVENOUS PRN
Status: DISCONTINUED | OUTPATIENT
Start: 2024-04-23 | End: 2024-04-24 | Stop reason: HOSPADM

## 2024-04-23 RX ADMIN — FAM-TRASTUZUMAB DERUXTECAN-NXKI 346 MG: 100 INJECTION, POWDER, LYOPHILIZED, FOR SOLUTION INTRAVENOUS at 13:39

## 2024-04-23 RX ADMIN — DEXTROSE MONOHYDRATE 25 ML/HR: 50 INJECTION, SOLUTION INTRAVENOUS at 12:14

## 2024-04-23 RX ADMIN — FOSAPREPITANT 150 MG: 150 INJECTION, POWDER, LYOPHILIZED, FOR SOLUTION INTRAVENOUS at 12:47

## 2024-04-23 RX ADMIN — POTASSIUM CHLORIDE 40 MEQ: 750 TABLET, FILM COATED, EXTENDED RELEASE ORAL at 12:21

## 2024-04-23 RX ADMIN — SODIUM CHLORIDE, PRESERVATIVE FREE 20 ML: 5 INJECTION INTRAVENOUS at 14:45

## 2024-04-23 RX ADMIN — DEXAMETHASONE SODIUM PHOSPHATE 12 MG: 4 INJECTION, SOLUTION INTRAMUSCULAR; INTRAVENOUS at 12:18

## 2024-04-23 RX ADMIN — PALONOSETRON 0.25 MG: 0.05 INJECTION, SOLUTION INTRAVENOUS at 12:14

## 2024-04-23 ASSESSMENT — PAIN DESCRIPTION - LOCATION: LOCATION: HEAD

## 2024-04-23 ASSESSMENT — PAIN DESCRIPTION - DESCRIPTORS: DESCRIPTORS: ACHING

## 2024-04-23 ASSESSMENT — PAIN DESCRIPTION - ORIENTATION: ORIENTATION: LEFT

## 2024-04-23 ASSESSMENT — PAIN SCALES - GENERAL: PAINLEVEL_OUTOF10: 2

## 2024-04-23 NOTE — PROGRESS NOTES
[facility] VISIT NOTE  Date: 2024    Name: Debra Moser    MRN: [id number]         : 1960    Ms. Lobito Moser Arrived ambulatory and in no distress for C4D1 of Enhertu Regimen.  Assessment was completed, no acute issues at this time, pt c/o left sided headache and cough. MD notified. Chest wall port accessed without difficulty by the assessment nurse, labs drawn & sent for processing. Pt proceeded to MD appointment with medical oncology. Criteria met for today treatment.      Ms. Lobito Moser's vitals were reviewed.  Patient Vitals for the past 12 hrs:   Temp Pulse Resp BP SpO2   24 1445 98.1 °F (36.7 °C) 83 17 110/61 93 %   24 1015 98 °F (36.7 °C) 86 17 110/70 92 %           Lab results were obtained and reviewed.  Recent Results (from the past 12 hour(s))   Comprehensive metabolic panel    Collection Time: 24 10:24 AM   Result Value Ref Range    Sodium 140 136 - 145 mmol/L    Potassium 3.4 (L) 3.5 - 5.1 mmol/L    Chloride 106 97 - 108 mmol/L    CO2 31 21 - 32 mmol/L    Anion Gap 3 (L) 5 - 15 mmol/L    Glucose 174 (H) 65 - 100 mg/dL    BUN 6 6 - 20 MG/DL    Creatinine 0.68 0.55 - 1.02 MG/DL    Bun/Cre Ratio 9 (L) 12 - 20      Est, Glom Filt Rate >90 >60 ml/min/1.73m2    Calcium 9.2 8.5 - 10.1 MG/DL    Total Bilirubin 0.7 0.2 - 1.0 MG/DL    ALT 12 12 - 78 U/L    AST 37 15 - 37 U/L    Alk Phosphatase 99 45 - 117 U/L    Total Protein 7.5 6.4 - 8.2 g/dL    Albumin 2.7 (L) 3.5 - 5.0 g/dL    Globulin 4.8 (H) 2.0 - 4.0 g/dL    Albumin/Globulin Ratio 0.6 (L) 1.1 - 2.2     Magnesium    Collection Time: 24 10:24 AM   Result Value Ref Range    Magnesium 2.3 1.6 - 2.4 mg/dL   CBC with Partial Differential    Collection Time: 24 10:25 AM   Result Value Ref Range    WBC 6.2 3.6 - 11.0 K/uL    RBC 3.43 (L) 3.80 - 5.20 M/uL    Hemoglobin 10.7 (L) 11.5 - 16.0 g/dL    Hematocrit 33.8 (L) 35.0 - 47.0 %    MCV 98.5 80.0 - 99.0 FL    MCH 31.2 26.0 - 34.0 PG    MCHC 31.7  30.0 - 36.5 g/dL    RDW 16.2 (H) 11.8 - 15.8 %    Platelets 430 (H) 150 - 400 K/uL    Neutrophils % 76 (H) 32 - 75 %    Mixed Cells 8 3.2 - 16.9 %    Lymphocytes % 16 12 - 49 %    Neutrophils Absolute 4.7 1.8 - 8.0 K/UL    ABSOLUTE MIXED CELLS 0.5 0.2 - 1.2 K/uL    Lymphocytes Absolute 1.0 0.8 - 3.5 K/UL    Differential Type AUTOMATED         Medications received:  Medications Administered         dexAMETHasone (DECADRON) 12 mg in sodium chloride 0.9 % 50 mL IVPB Admin Date  04/23/2024 Action  New Bag Dose  12 mg Rate  200 mL/hr Route  IntraVENous Administered By  Nadja Webb RN        dextrose 5 % solution Admin Date  04/23/2024 Action  New Bag Dose  25 mL/hr Rate  25 mL/hr Route  IntraVENous Administered By  Nadja Webb RN        fam-trastuzumab deruxtec-nxki (ENHERTU) 346 mg in dextrose 5 % 100 mL chemo IVPB Admin Date  04/23/2024 Action  New Bag Dose  346 mg Rate  254.6 mL/hr Route  IntraVENous Administered By  Nadja Webb RN        fosaprepitant (EMEND) 150 mg in sodium chloride 0.9 % 150 mL IVPB Admin Date  04/23/2024 Action  New Bag Dose  150 mg Rate  450 mL/hr Route  IntraVENous Administered By  Nadja Webb RN        palonosetron (ALOXI) injection 0.25 mg Admin Date  04/23/2024 Action  Given Dose  0.25 mg Rate   Route  IntraVENous Administered By  Nadja Webb RN        potassium chloride (KLOR-CON) extended release tablet 40 mEq Admin Date  04/23/2024 Action  Given Dose  40 mEq Rate   Route  Oral Administered By  Nadja Webb RN        sodium chloride flush 0.9 % injection 5-40 mL Admin Date  04/23/2024 Action  Given Dose  20 mL Rate   Route  IntraVENous Administered By  Nadja Webb RN             Two nurses verified prior to administering:    Drug name  Drug dose  Infusion volume or drug volume when prepared in a syringe  Rate of administration  Route of administration  Expiration dates and/or times  Appearance and physical integrity of the drugs  Rate set on infusion pump, when used  Sequencing of drug

## 2024-04-23 NOTE — PROGRESS NOTES
[facility] [place] at [facility] [place]  [address], [address] [place] [address]  W: [phone]  F: [phone]      Reason for Visit:   Debra Moser is a 64 y.o. female who has transferred to me from [place].    Treatment History:   2016:  Original right stage 3 breast cancer, s/p mastectomy with 5 cm IDC, 3/19 LN, ER negative, [place] negative, HER 2 +, treated with chemotherapy (unknown) and XRT (in [facility])  9/2018:  recurrent breast cancer to her neck, tx with docetaxel q 3 weeks x 9, completed 4/12/2019 (PD) ([facility])  6/25/19 L ax LN FNA:  poorly differentiated carcinoma, c/w breast cancer, ER negative, [place] negative, HER 2 positive at IHC 3+  Tempus testing:  HER 2 overexpression  7/26/19 Trastuzumab/pertuzumab/paclitaxel until 7/2021 after 32 cycles (PD) ([place], [place])  XRT to right neck 1/2021  T-DM1 8/10/21- T-Dxd  T-Dxd 7/2022- 9/26/23   Hold 9/26/23 due to gr 1 ILD - discontinued due to continued concern for chronic ILD on CT  Right shoulder dislocation and infection 11/17/22  Zometa 9/5/2023 - current  Bronch, BAL, malignant cells present, compatible with carcinoma, not able to do receptors  Trastuzumab 11/21/23 - 1/23/24 (PD)  Tucatinib 11/13/23 - 2/13/24 (PD)  Capecitabine 11/23/23 - 2/13/24 (PD)  T-Dxd 2/20/24- current    History of Present Illness:   Recently moved to [place] to live with daughter.    Interval Hx: Patient presents today for follow up and treatment.  Reports gr 1 cough. She originally said that this was more bothersome but now states it is 25% better today. She also reports a new headache that is slowly worsening and daily - relieved by tylenol.     Entire visit was done with Diane from [employer].    Past Medical History:   Diagnosis Date    Cancer (HCC)     breast on right    Diabetes mellitus (HCC)     Hypertension       Past Surgical History:   Procedure Laterality Date    BRONCHOSCOPY N/A 11/16/2023    BRONCHOSCOPY performed by [place]

## 2024-04-23 NOTE — PROGRESS NOTES
Debra Moser is a 64 y.o. female here today to follow up for breast cancer    1. Have you been to the ER, urgent care clinic since your last visit?  Hospitalized since your last visit? no    2. Have you seen or consulted any other health care providers outside of the [facility] since your last visit?  Include any pap smears or colon screening.  no

## 2024-04-24 ENCOUNTER — HOSPITAL ENCOUNTER (OUTPATIENT)
Facility: HOSPITAL | Age: 64
Discharge: HOME | End: 2024-04-27

## 2024-04-24 VITALS — WEIGHT: 135 LBS | BODY MASS INDEX: 24.69 KG/M2

## 2024-04-24 DIAGNOSIS — C50.919 CARCINOMA OF BREAST METASTATIC TO BONE, UNSPECIFIED LATERALITY (HCC): ICD-10-CM

## 2024-04-24 DIAGNOSIS — C79.51 CARCINOMA OF BREAST METASTATIC TO BONE, UNSPECIFIED LATERALITY (HCC): ICD-10-CM

## 2024-04-24 DIAGNOSIS — R51.9 NONINTRACTABLE HEADACHE, UNSPECIFIED CHRONICITY PATTERN, UNSPECIFIED HEADACHE TYPE: ICD-10-CM

## 2024-04-24 DIAGNOSIS — Z17.1 MALIGNANT NEOPLASM OF OVERLAPPING SITES OF RIGHT BREAST IN FEMALE, ESTROGEN RECEPTOR NEGATIVE (HCC): ICD-10-CM

## 2024-04-24 DIAGNOSIS — C50.811 MALIGNANT NEOPLASM OF OVERLAPPING SITES OF RIGHT BREAST IN FEMALE, ESTROGEN RECEPTOR NEGATIVE (HCC): ICD-10-CM

## 2024-04-24 PROCEDURE — 6360000004 HC RX CONTRAST MEDICATION: Performed by: RADIOLOGY

## 2024-04-24 PROCEDURE — A9575 INJ GADOTERATE MEGLUMI 0.1ML: HCPCS | Performed by: RADIOLOGY

## 2024-04-24 PROCEDURE — 70553 MRI BRAIN STEM W/O & W/DYE: CPT

## 2024-04-24 RX ORDER — GADOTERATE MEGLUMINE 376.9 MG/ML
12 INJECTION INTRAVENOUS
Status: COMPLETED | OUTPATIENT
Start: 2024-04-24 | End: 2024-04-24

## 2024-04-24 RX ADMIN — GADOTERATE MEGLUMINE 12 ML: 376.9 INJECTION INTRAVENOUS at 08:35

## 2024-05-01 ENCOUNTER — HOSPITAL ENCOUNTER (OUTPATIENT)
Facility: HOSPITAL | Age: 64
Discharge: HOME | End: 2024-05-04
Payer: SUBSIDIZED

## 2024-05-01 DIAGNOSIS — C50.811 MALIGNANT NEOPLASM OF OVERLAPPING SITES OF RIGHT BREAST IN FEMALE, ESTROGEN RECEPTOR NEGATIVE (HCC): ICD-10-CM

## 2024-05-01 DIAGNOSIS — Z17.1 MALIGNANT NEOPLASM OF OVERLAPPING SITES OF RIGHT BREAST IN FEMALE, ESTROGEN RECEPTOR NEGATIVE (HCC): ICD-10-CM

## 2024-05-01 DIAGNOSIS — C50.919 CARCINOMA OF BREAST METASTATIC TO BONE, UNSPECIFIED LATERALITY (HCC): ICD-10-CM

## 2024-05-01 DIAGNOSIS — R05.3 CHRONIC COUGH: ICD-10-CM

## 2024-05-01 DIAGNOSIS — C79.51 CARCINOMA OF BREAST METASTATIC TO BONE, UNSPECIFIED LATERALITY (HCC): ICD-10-CM

## 2024-05-01 PROCEDURE — 71250 CT THORAX DX C-: CPT

## 2024-05-01 PROCEDURE — A9503 TC99M MEDRONATE: HCPCS | Performed by: NURSE PRACTITIONER

## 2024-05-01 PROCEDURE — 3430000000 HC RX DIAGNOSTIC RADIOPHARMACEUTICAL: Performed by: NURSE PRACTITIONER

## 2024-05-01 PROCEDURE — 6360000004 HC RX CONTRAST MEDICATION: Performed by: NURSE PRACTITIONER

## 2024-05-01 PROCEDURE — 78306 BONE IMAGING WHOLE BODY: CPT | Performed by: NURSE PRACTITIONER

## 2024-05-01 PROCEDURE — 74177 CT ABD & PELVIS W/CONTRAST: CPT

## 2024-05-01 RX ORDER — TC 99M MEDRONATE 20 MG/10ML
24.1 INJECTION, POWDER, LYOPHILIZED, FOR SOLUTION INTRAVENOUS
Status: COMPLETED | OUTPATIENT
Start: 2024-05-01 | End: 2024-05-01

## 2024-05-01 RX ADMIN — TC 99M MEDRONATE 24.1 MILLICURIE: 20 INJECTION, POWDER, LYOPHILIZED, FOR SOLUTION INTRAVENOUS at 07:57

## 2024-05-01 RX ADMIN — IOPAMIDOL 100 ML: 755 INJECTION, SOLUTION INTRAVENOUS at 08:28

## 2024-05-03 ENCOUNTER — TELEPHONE (OUTPATIENT)
Age: 64
End: 2024-05-03

## 2024-05-03 DIAGNOSIS — C79.51 CARCINOMA OF BREAST METASTATIC TO BONE, UNSPECIFIED LATERALITY (HCC): ICD-10-CM

## 2024-05-03 DIAGNOSIS — J18.9 PNEUMONIA DUE TO INFECTIOUS ORGANISM, UNSPECIFIED LATERALITY, UNSPECIFIED PART OF LUNG: Primary | ICD-10-CM

## 2024-05-03 DIAGNOSIS — R90.89 ABNORMAL BRAIN MRI: ICD-10-CM

## 2024-05-03 DIAGNOSIS — C50.919 CARCINOMA OF BREAST METASTATIC TO BONE, UNSPECIFIED LATERALITY (HCC): ICD-10-CM

## 2024-05-03 RX ORDER — LEVOFLOXACIN 750 MG/1
750 TABLET, FILM COATED ORAL DAILY
Qty: 14 TABLET | Refills: 0 | Status: SHIPPED | OUTPATIENT
Start: 2024-05-03 | End: 2024-05-17

## 2024-05-03 NOTE — TELEPHONE ENCOUNTER
[facility] [place] at [facility]  [phone]    Entire conversation interpreted with Lizabeth from [employer].     Called to number provided which is patient's daughter, Marina. PHI reviewed. Reviewed brain MRI showed no cancer but did show a small infarct - will refer to neurology. Also discussed concern for pneumonia, rx for levofloxacin 750mg once daily x14 days sent to [facility]. GoodRx coupon send to Marina's phone for discount. Reviewed that CT lung showed some changes that could be caused by her treatment vs her disease but we are hopeful this is just an infection that will improve with the antibiotics. Marina had no further questions or concerns and will call back with any questions.       Signed By: KIMBERLEY Lemos NP

## 2024-05-07 RX ORDER — SODIUM CHLORIDE 9 MG/ML
5-250 INJECTION, SOLUTION INTRAVENOUS PRN
Status: CANCELLED | OUTPATIENT
Start: 2024-05-14

## 2024-05-07 RX ORDER — FAMOTIDINE 10 MG/ML
20 INJECTION, SOLUTION INTRAVENOUS
Status: CANCELLED | OUTPATIENT
Start: 2024-05-14

## 2024-05-07 RX ORDER — MEPERIDINE HYDROCHLORIDE 25 MG/ML
12.5 INJECTION INTRAMUSCULAR; INTRAVENOUS; SUBCUTANEOUS PRN
Status: CANCELLED | OUTPATIENT
Start: 2024-05-14

## 2024-05-07 RX ORDER — SODIUM CHLORIDE 9 MG/ML
5-250 INJECTION, SOLUTION INTRAVENOUS PRN
OUTPATIENT
Start: 2024-06-04

## 2024-05-07 RX ORDER — ZOLEDRONIC ACID 0.04 MG/ML
4 INJECTION, SOLUTION INTRAVENOUS ONCE
OUTPATIENT
Start: 2024-06-04 | End: 2024-06-04

## 2024-05-07 RX ORDER — ONDANSETRON 2 MG/ML
8 INJECTION INTRAMUSCULAR; INTRAVENOUS
Status: CANCELLED | OUTPATIENT
Start: 2024-05-14

## 2024-05-07 RX ORDER — DIPHENHYDRAMINE HYDROCHLORIDE 50 MG/ML
50 INJECTION INTRAMUSCULAR; INTRAVENOUS
Status: CANCELLED | OUTPATIENT
Start: 2024-05-14

## 2024-05-07 RX ORDER — HEPARIN 100 UNIT/ML
500 SYRINGE INTRAVENOUS PRN
Status: CANCELLED | OUTPATIENT
Start: 2024-05-14

## 2024-05-07 RX ORDER — EPINEPHRINE 1 MG/ML
0.3 INJECTION, SOLUTION INTRAMUSCULAR; SUBCUTANEOUS PRN
Status: CANCELLED | OUTPATIENT
Start: 2024-05-14

## 2024-05-07 RX ORDER — ACETAMINOPHEN 325 MG/1
650 TABLET ORAL
Status: CANCELLED | OUTPATIENT
Start: 2024-05-14

## 2024-05-07 RX ORDER — ALBUTEROL SULFATE 90 UG/1
4 AEROSOL, METERED RESPIRATORY (INHALATION) PRN
Status: CANCELLED | OUTPATIENT
Start: 2024-05-14

## 2024-05-07 RX ORDER — SODIUM CHLORIDE 9 MG/ML
INJECTION, SOLUTION INTRAVENOUS CONTINUOUS
Status: CANCELLED | OUTPATIENT
Start: 2024-05-14

## 2024-05-13 ENCOUNTER — TELEPHONE (OUTPATIENT)
Age: 64
End: 2024-05-13

## 2024-05-13 NOTE — TELEPHONE ENCOUNTER
Called patient with , Cherelle, regarding the information below. Patient took down information.           [facility]   October 3rd at 2pm with a 130 arrival   Dr. Abigail Desir    [address], [address]  [place] [address] [phone]

## 2024-05-14 ENCOUNTER — HOSPITAL ENCOUNTER (OUTPATIENT)
Facility: HOSPITAL | Age: 64
Setting detail: INFUSION SERIES
Discharge: HOME | End: 2024-05-14

## 2024-05-14 ENCOUNTER — OFFICE VISIT (OUTPATIENT)
Age: 64
End: 2024-05-14

## 2024-05-14 VITALS
SYSTOLIC BLOOD PRESSURE: 103 MMHG | TEMPERATURE: 98.1 F | RESPIRATION RATE: 18 BRPM | HEIGHT: 62 IN | WEIGHT: 135 LBS | BODY MASS INDEX: 24.84 KG/M2 | HEART RATE: 89 BPM | OXYGEN SATURATION: 93 % | DIASTOLIC BLOOD PRESSURE: 60 MMHG

## 2024-05-14 VITALS
HEIGHT: 62 IN | DIASTOLIC BLOOD PRESSURE: 62 MMHG | TEMPERATURE: 98.1 F | SYSTOLIC BLOOD PRESSURE: 111 MMHG | WEIGHT: 135.4 LBS | HEART RATE: 81 BPM | RESPIRATION RATE: 18 BRPM | BODY MASS INDEX: 24.92 KG/M2

## 2024-05-14 DIAGNOSIS — C50.911 CARCINOMA OF RIGHT BREAST METASTATIC TO BONE (HCC): ICD-10-CM

## 2024-05-14 DIAGNOSIS — C78.00 BREAST CANCER METASTASIZED TO LUNG, RIGHT (HCC): ICD-10-CM

## 2024-05-14 DIAGNOSIS — Z51.11 ENCOUNTER FOR ANTINEOPLASTIC CHEMOTHERAPY: Primary | ICD-10-CM

## 2024-05-14 DIAGNOSIS — C50.919 CARCINOMA OF BREAST METASTATIC TO BONE, UNSPECIFIED LATERALITY (HCC): ICD-10-CM

## 2024-05-14 DIAGNOSIS — I10 HYPERTENSION, UNSPECIFIED TYPE: ICD-10-CM

## 2024-05-14 DIAGNOSIS — C50.911 BREAST CANCER METASTASIZED TO LUNG, RIGHT (HCC): ICD-10-CM

## 2024-05-14 DIAGNOSIS — Z17.1 MALIGNANT NEOPLASM OF BREAST IN FEMALE, ESTROGEN RECEPTOR NEGATIVE, UNSPECIFIED LATERALITY, UNSPECIFIED SITE OF BREAST (HCC): ICD-10-CM

## 2024-05-14 DIAGNOSIS — Z17.1 MALIGNANT NEOPLASM OF OVERLAPPING SITES OF RIGHT BREAST IN FEMALE, ESTROGEN RECEPTOR NEGATIVE (HCC): Primary | ICD-10-CM

## 2024-05-14 DIAGNOSIS — C79.51 CARCINOMA OF BREAST METASTATIC TO BONE, UNSPECIFIED LATERALITY (HCC): ICD-10-CM

## 2024-05-14 DIAGNOSIS — C50.911 CARCINOMA OF RIGHT BREAST METASTATIC TO AXILLARY LYMPH NODE (HCC): ICD-10-CM

## 2024-05-14 DIAGNOSIS — C77.3 CARCINOMA OF RIGHT BREAST METASTATIC TO AXILLARY LYMPH NODE (HCC): ICD-10-CM

## 2024-05-14 DIAGNOSIS — C50.911 CARCINOMA OF RIGHT BREAST METASTATIC TO LIVER (HCC): ICD-10-CM

## 2024-05-14 DIAGNOSIS — C79.51 CARCINOMA OF RIGHT BREAST METASTATIC TO BONE (HCC): ICD-10-CM

## 2024-05-14 DIAGNOSIS — C78.7 CARCINOMA OF RIGHT BREAST METASTATIC TO LIVER (HCC): ICD-10-CM

## 2024-05-14 DIAGNOSIS — C50.811 MALIGNANT NEOPLASM OF OVERLAPPING SITES OF RIGHT BREAST IN FEMALE, ESTROGEN RECEPTOR NEGATIVE (HCC): Primary | ICD-10-CM

## 2024-05-14 DIAGNOSIS — C50.919 MALIGNANT NEOPLASM OF BREAST IN FEMALE, ESTROGEN RECEPTOR NEGATIVE, UNSPECIFIED LATERALITY, UNSPECIFIED SITE OF BREAST (HCC): ICD-10-CM

## 2024-05-14 LAB
ALBUMIN SERPL-MCNC: 2.8 G/DL (ref 3.5–5)
ALBUMIN/GLOB SERPL: 0.7 (ref 1.1–2.2)
ALP SERPL-CCNC: 85 U/L (ref 45–117)
ALT SERPL-CCNC: 11 U/L (ref 12–78)
ANION GAP SERPL CALC-SCNC: 6 MMOL/L (ref 5–15)
AST SERPL-CCNC: 38 U/L (ref 15–37)
BASOPHILS # BLD: 0.1 K/UL (ref 0–0.1)
BASOPHILS NFR BLD: 2 % (ref 0–1)
BILIRUB SERPL-MCNC: 0.3 MG/DL (ref 0.2–1)
BUN SERPL-MCNC: 9 MG/DL (ref 6–20)
BUN/CREAT SERPL: 13 (ref 12–20)
CALCIUM SERPL-MCNC: 8.8 MG/DL (ref 8.5–10.1)
CHLORIDE SERPL-SCNC: 107 MMOL/L (ref 97–108)
CO2 SERPL-SCNC: 28 MMOL/L (ref 21–32)
CREAT SERPL-MCNC: 0.71 MG/DL (ref 0.55–1.02)
DIFFERENTIAL METHOD BLD: ABNORMAL
EOSINOPHIL # BLD: 0 K/UL (ref 0–0.4)
EOSINOPHIL NFR BLD: 1 % (ref 0–7)
ERYTHROCYTE [DISTWIDTH] IN BLOOD BY AUTOMATED COUNT: 16.9 % (ref 11.5–14.5)
GLOBULIN SER CALC-MCNC: 4.3 G/DL (ref 2–4)
GLUCOSE SERPL-MCNC: 148 MG/DL (ref 65–100)
HCT VFR BLD AUTO: 33.9 % (ref 35–47)
HGB BLD-MCNC: 10.7 G/DL (ref 11.5–16)
IMM GRANULOCYTES # BLD AUTO: 0 K/UL (ref 0–0.04)
IMM GRANULOCYTES NFR BLD AUTO: 1 % (ref 0–0.5)
LYMPHOCYTES # BLD: 0.9 K/UL (ref 0.8–3.5)
LYMPHOCYTES NFR BLD: 19 % (ref 12–49)
MCH RBC QN AUTO: 30.7 PG (ref 26–34)
MCHC RBC AUTO-ENTMCNC: 31.6 G/DL (ref 30–36.5)
MCV RBC AUTO: 97.4 FL (ref 80–99)
MONOCYTES # BLD: 0.5 K/UL (ref 0–1)
MONOCYTES NFR BLD: 10 % (ref 5–13)
NEUTS SEG # BLD: 3.2 K/UL (ref 1.8–8)
NEUTS SEG NFR BLD: 67 % (ref 32–75)
NRBC # BLD: 0 K/UL (ref 0–0.01)
NRBC BLD-RTO: 0 PER 100 WBC
PLATELET # BLD AUTO: 433 K/UL (ref 150–400)
PMV BLD AUTO: 8.1 FL (ref 8.9–12.9)
POTASSIUM SERPL-SCNC: 3.3 MMOL/L (ref 3.5–5.1)
PROT SERPL-MCNC: 7.1 G/DL (ref 6.4–8.2)
RBC # BLD AUTO: 3.48 M/UL (ref 3.8–5.2)
SODIUM SERPL-SCNC: 141 MMOL/L (ref 136–145)
WBC # BLD AUTO: 4.8 K/UL (ref 3.6–11)

## 2024-05-14 PROCEDURE — 2580000003 HC RX 258: Performed by: INTERNAL MEDICINE

## 2024-05-14 PROCEDURE — 85025 COMPLETE CBC W/AUTO DIFF WBC: CPT

## 2024-05-14 PROCEDURE — 6370000000 HC RX 637 (ALT 250 FOR IP)

## 2024-05-14 PROCEDURE — 6360000002 HC RX W HCPCS: Performed by: INTERNAL MEDICINE

## 2024-05-14 PROCEDURE — 96375 TX/PRO/DX INJ NEW DRUG ADDON: CPT

## 2024-05-14 PROCEDURE — 80053 COMPREHEN METABOLIC PANEL: CPT

## 2024-05-14 PROCEDURE — 3078F DIAST BP <80 MM HG: CPT | Performed by: INTERNAL MEDICINE

## 2024-05-14 PROCEDURE — 96413 CHEMO IV INFUSION 1 HR: CPT

## 2024-05-14 PROCEDURE — 99215 OFFICE O/P EST HI 40 MIN: CPT | Performed by: INTERNAL MEDICINE

## 2024-05-14 PROCEDURE — 96367 TX/PROPH/DG ADDL SEQ IV INF: CPT

## 2024-05-14 PROCEDURE — 3074F SYST BP LT 130 MM HG: CPT | Performed by: INTERNAL MEDICINE

## 2024-05-14 RX ORDER — AMLODIPINE BESYLATE 5 MG/1
5 TABLET ORAL DAILY
Qty: 90 TABLET | Refills: 1 | Status: SHIPPED | OUTPATIENT
Start: 2024-05-14

## 2024-05-14 RX ORDER — DEXTROSE MONOHYDRATE 50 MG/ML
5-250 INJECTION, SOLUTION INTRAVENOUS PRN
Status: DISCONTINUED | OUTPATIENT
Start: 2024-05-14 | End: 2024-05-15 | Stop reason: HOSPADM

## 2024-05-14 RX ORDER — PALONOSETRON 0.05 MG/ML
0.25 INJECTION, SOLUTION INTRAVENOUS ONCE
Status: COMPLETED | OUTPATIENT
Start: 2024-05-14 | End: 2024-05-14

## 2024-05-14 RX ORDER — SODIUM CHLORIDE 0.9 % (FLUSH) 0.9 %
5-40 SYRINGE (ML) INJECTION PRN
Status: DISCONTINUED | OUTPATIENT
Start: 2024-05-14 | End: 2024-05-15 | Stop reason: HOSPADM

## 2024-05-14 RX ORDER — POTASSIUM CHLORIDE 750 MG/1
40 TABLET, FILM COATED, EXTENDED RELEASE ORAL ONCE
Status: COMPLETED | OUTPATIENT
Start: 2024-05-14 | End: 2024-05-14

## 2024-05-14 RX ORDER — POTASSIUM CHLORIDE 750 MG/1
TABLET, FILM COATED, EXTENDED RELEASE ORAL
Status: COMPLETED
Start: 2024-05-14 | End: 2024-05-14

## 2024-05-14 RX ADMIN — DEXTROSE MONOHYDRATE 25 ML/HR: 50 INJECTION, SOLUTION INTRAVENOUS at 12:04

## 2024-05-14 RX ADMIN — POTASSIUM CHLORIDE 40 MEQ: 750 TABLET, FILM COATED, EXTENDED RELEASE ORAL at 14:26

## 2024-05-14 RX ADMIN — DEXAMETHASONE SODIUM PHOSPHATE 12 MG: 4 INJECTION, SOLUTION INTRA-ARTICULAR; INTRALESIONAL; INTRAMUSCULAR; INTRAVENOUS; SOFT TISSUE at 12:08

## 2024-05-14 RX ADMIN — PALONOSETRON HYDROCHLORIDE 0.25 MG: 0.25 INJECTION INTRAVENOUS at 12:05

## 2024-05-14 RX ADMIN — FOSAPREPITANT 150 MG: 150 INJECTION, POWDER, LYOPHILIZED, FOR SOLUTION INTRAVENOUS at 12:27

## 2024-05-14 RX ADMIN — SODIUM CHLORIDE, PRESERVATIVE FREE 20 ML: 5 INJECTION INTRAVENOUS at 14:08

## 2024-05-14 RX ADMIN — FAM-TRASTUZUMAB DERUXTECAN-NXKI 346 MG: 100 INJECTION, POWDER, LYOPHILIZED, FOR SOLUTION INTRAVENOUS at 13:32

## 2024-05-14 ASSESSMENT — PAIN SCALES - GENERAL: PAINLEVEL_OUTOF10: 0

## 2024-05-14 NOTE — PROGRESS NOTES
[facility] Chemotherapy Progress Note    Pt admit to [facility] for C5D1 Enhertu in stable condition. Assessment completed.  PAC with positive blood return. Labs were drawn and sent for processing. Pt proceeded to scheduled appt.    Kera[employer] video remote interpreting utilized.       No new concerns voiced.  Pt reported to assessment nurse that her cough is improving with the current prescription medication.    VS  Vitals:    05/14/24 1000 05/14/24 1012 05/14/24 1409   BP:  103/60 111/62   Pulse:  89 81   Resp:  18    Temp:  98.1 °F (36.7 °C)    Weight: 61.4 kg (135 lb 6.4 oz)     Height: 1.575 m (5' 2\")           Medications:  Medications Administered         dexAMETHasone (DECADRON) 12 mg in sodium chloride 0.9 % 50 mL IVPB Admin Date  05/14/2024 Action  New Bag Dose  12 mg Rate  200 mL/hr Route  IntraVENous Administered By  Cinthia Larkin RN        dextrose 5 % solution Admin Date  05/14/2024 Action  New Bag Dose  25 mL/hr Rate  25 mL/hr Route  IntraVENous Administered By  Cinthia Larkin RN        fam-trastuzumab deruxtec-nxki (ENHERTU) 346 mg in dextrose 5 % 100 mL chemo IVPB Admin Date  05/14/2024 Action  New Bag Dose  346 mg Rate  254.6 mL/hr Route  IntraVENous Administered By  Blanka Banda RN        fosaprepitant (EMEND) 150 mg in sodium chloride 0.9 % 250 mL IVPB Admin Date  05/14/2024 Action  New Bag Dose  150 mg Rate  840 mL/hr Route  IntraVENous Administered By  Cinthia Larkin RN        palonosetron (ALOXI) injection 0.25 mg Admin Date  05/14/2024 Action  Given Dose  0.25 mg Rate   Route  IntraVENous Administered By  Cinthia Larkin RN        potassium chloride (KLOR-CON) extended release tablet 40 mEq Admin Date  05/14/2024 Action  Given Dose  40 mEq Rate   Route  Oral Administered By  Cinthia Larkin RN        sodium chloride flush 0.9 % injection 5-40 mL Admin Date  05/14/2024 Action  Given Dose  20 mL Rate   Route  IntraVENous Administered

## 2024-05-14 NOTE — PROGRESS NOTES
Identified pt with two pt identifiers(name and ). Reviewed record in preparation for visit and have obtained necessary documentation. All patient medications has been reviewed.  Chief Complaint   Patient presents with    Follow-up      3 week f/u       Vitals:    24 1108   BP: 103/60   Pulse: 89   Resp: 18   Temp: 98.1 °F (36.7 °C)   SpO2: 93%                   Coordination of Care Questionnaire:   1) Have you been to an emergency room, urgent care, or hospitalized since your last visit?  no      2. Have seen or consulted any other health care provider since your last visit? no          
aneurysm.  REPRODUCTIVE ORGANS: Hysterectomy  URINARY BLADDER: No mass or calculus.  BONES: No destructive bone lesion.  ABDOMINAL WALL: Fat-containing umbilical hernia.  ADDITIONAL COMMENTS: N/A     IMPRESSION:  1.  Stable exam with regards to interstitial pulmonary metastases.  2.  Increased right pleural effusion, still small.  3.  Stable liver lesions at the dome of the liver.  4.  No new metastases.  5.  No osseous metastatic disease    5/1/24 bone scan  FINDINGS: Degenerative changes again noted in the right shoulder. Old bilateral  rib fractures unchanged. Degenerative changes lumbosacral spine. Stable activity  left greater trochanter. There is no new abnormal uptake.  Incidental renal  imaging shows no abnormality.      IMPRESSION:  Stable whole-body bone scan without new scintigraphic evidence of  osseous metastatic disease.    5/1/24 CT c/a/p     Port-A-Cath tip is at the cavoatrial junction.  TRACHEA/BRONCHI: Patent.  PLEURA: There is a moderate right pleural effusion which has increased in the  interval. Pleural plaque and pleural calcification anterolaterally in the left  hemithorax is stable.. The pleural thickening and pleural calcification  anteriorly in the right chest at the level of the middle lobe and upper lobe is  unchanged.  LUNGS: The inspiratory images are degraded by respiratory motion. The expiratory  images which are to similar degree of inspiration as the inspiratory images are  not degraded by respiratory motion.     There is persistent consolidation medially in the right upper lobe which overall  has increased in the interval. Additionally, there is new dense consolidation in  the superior segment of the right lower lobe which has developed in the  interval. Additionally there is new dense consolidation in the anteromedial  segment of the left lower lobe laterally. These areas of consolidation are  concerning for pneumonia.     There is persistent evidence for lymphangitic spread of

## 2024-05-29 RX ORDER — ONDANSETRON 2 MG/ML
8 INJECTION INTRAMUSCULAR; INTRAVENOUS
Status: CANCELLED | OUTPATIENT
Start: 2024-06-11

## 2024-05-29 RX ORDER — DIPHENHYDRAMINE HYDROCHLORIDE 50 MG/ML
50 INJECTION INTRAMUSCULAR; INTRAVENOUS
Status: CANCELLED | OUTPATIENT
Start: 2024-06-11

## 2024-05-29 RX ORDER — ALBUTEROL SULFATE 90 UG/1
4 AEROSOL, METERED RESPIRATORY (INHALATION) PRN
Status: CANCELLED | OUTPATIENT
Start: 2024-06-11

## 2024-05-29 RX ORDER — HEPARIN 100 UNIT/ML
500 SYRINGE INTRAVENOUS PRN
Status: CANCELLED | OUTPATIENT
Start: 2024-06-11

## 2024-05-29 RX ORDER — SODIUM CHLORIDE 9 MG/ML
5-250 INJECTION, SOLUTION INTRAVENOUS PRN
Status: CANCELLED | OUTPATIENT
Start: 2024-06-11

## 2024-05-29 RX ORDER — EPINEPHRINE 1 MG/ML
0.3 INJECTION, SOLUTION INTRAMUSCULAR; SUBCUTANEOUS PRN
Status: CANCELLED | OUTPATIENT
Start: 2024-06-11

## 2024-05-29 RX ORDER — SODIUM CHLORIDE 9 MG/ML
INJECTION, SOLUTION INTRAVENOUS CONTINUOUS
Status: CANCELLED | OUTPATIENT
Start: 2024-06-11

## 2024-05-29 RX ORDER — SODIUM CHLORIDE 0.9 % (FLUSH) 0.9 %
5-40 SYRINGE (ML) INJECTION PRN
Status: CANCELLED | OUTPATIENT
Start: 2024-06-11

## 2024-05-29 RX ORDER — ACETAMINOPHEN 325 MG/1
650 TABLET ORAL
Status: CANCELLED | OUTPATIENT
Start: 2024-06-11

## 2024-05-29 RX ORDER — PALONOSETRON 0.05 MG/ML
0.25 INJECTION, SOLUTION INTRAVENOUS ONCE
Status: CANCELLED | OUTPATIENT
Start: 2024-06-11 | End: 2024-06-04

## 2024-05-29 RX ORDER — DEXTROSE MONOHYDRATE 50 MG/ML
5-250 INJECTION, SOLUTION INTRAVENOUS PRN
Status: CANCELLED | OUTPATIENT
Start: 2024-06-11

## 2024-05-29 RX ORDER — FAMOTIDINE 10 MG/ML
20 INJECTION, SOLUTION INTRAVENOUS
Status: CANCELLED | OUTPATIENT
Start: 2024-06-11

## 2024-06-04 ENCOUNTER — HOSPITAL ENCOUNTER (OUTPATIENT)
Dept: VASCULAR SURGERY | Facility: HOSPITAL | Age: 64
Discharge: HOME | End: 2024-06-06

## 2024-06-04 ENCOUNTER — OFFICE VISIT (OUTPATIENT)
Age: 64
End: 2024-06-04

## 2024-06-04 ENCOUNTER — HOSPITAL ENCOUNTER (OUTPATIENT)
Facility: HOSPITAL | Age: 64
Setting detail: INFUSION SERIES
Discharge: HOME | End: 2024-06-04

## 2024-06-04 VITALS
BODY MASS INDEX: 24.84 KG/M2 | HEART RATE: 84 BPM | TEMPERATURE: 98.1 F | WEIGHT: 135 LBS | SYSTOLIC BLOOD PRESSURE: 111 MMHG | HEIGHT: 62 IN | OXYGEN SATURATION: 94 % | RESPIRATION RATE: 18 BRPM | DIASTOLIC BLOOD PRESSURE: 70 MMHG

## 2024-06-04 VITALS
WEIGHT: 135.4 LBS | TEMPERATURE: 98.1 F | HEIGHT: 62 IN | HEART RATE: 84 BPM | SYSTOLIC BLOOD PRESSURE: 111 MMHG | OXYGEN SATURATION: 94 % | RESPIRATION RATE: 17 BRPM | BODY MASS INDEX: 24.92 KG/M2 | DIASTOLIC BLOOD PRESSURE: 70 MMHG

## 2024-06-04 DIAGNOSIS — R60.0 EDEMA OF RIGHT UPPER ARM: ICD-10-CM

## 2024-06-04 DIAGNOSIS — C50.811 MALIGNANT NEOPLASM OF OVERLAPPING SITES OF RIGHT BREAST IN FEMALE, ESTROGEN RECEPTOR NEGATIVE (HCC): ICD-10-CM

## 2024-06-04 DIAGNOSIS — Z17.1 MALIGNANT NEOPLASM OF OVERLAPPING SITES OF RIGHT BREAST IN FEMALE, ESTROGEN RECEPTOR NEGATIVE (HCC): ICD-10-CM

## 2024-06-04 DIAGNOSIS — R05.1 ACUTE COUGH: ICD-10-CM

## 2024-06-04 DIAGNOSIS — Z51.11 ENCOUNTER FOR ANTINEOPLASTIC CHEMOTHERAPY: ICD-10-CM

## 2024-06-04 DIAGNOSIS — Z51.11 ENCOUNTER FOR ANTINEOPLASTIC CHEMOTHERAPY: Primary | ICD-10-CM

## 2024-06-04 DIAGNOSIS — Z17.1 MALIGNANT NEOPLASM OF BREAST IN FEMALE, ESTROGEN RECEPTOR NEGATIVE, UNSPECIFIED LATERALITY, UNSPECIFIED SITE OF BREAST (HCC): ICD-10-CM

## 2024-06-04 DIAGNOSIS — C50.811 MALIGNANT NEOPLASM OF OVERLAPPING SITES OF RIGHT BREAST IN FEMALE, ESTROGEN RECEPTOR NEGATIVE (HCC): Primary | ICD-10-CM

## 2024-06-04 DIAGNOSIS — R93.89 ABNORMAL CT OF THE CHEST: ICD-10-CM

## 2024-06-04 DIAGNOSIS — Z17.1 MALIGNANT NEOPLASM OF OVERLAPPING SITES OF RIGHT BREAST IN FEMALE, ESTROGEN RECEPTOR NEGATIVE (HCC): Primary | ICD-10-CM

## 2024-06-04 DIAGNOSIS — C79.51 CARCINOMA OF BREAST METASTATIC TO BONE, UNSPECIFIED LATERALITY (HCC): ICD-10-CM

## 2024-06-04 DIAGNOSIS — C50.919 MALIGNANT NEOPLASM OF BREAST IN FEMALE, ESTROGEN RECEPTOR NEGATIVE, UNSPECIFIED LATERALITY, UNSPECIFIED SITE OF BREAST (HCC): ICD-10-CM

## 2024-06-04 DIAGNOSIS — C50.919 CARCINOMA OF BREAST METASTATIC TO BONE, UNSPECIFIED LATERALITY (HCC): ICD-10-CM

## 2024-06-04 LAB
ALBUMIN SERPL-MCNC: 2.9 G/DL (ref 3.5–5)
ALBUMIN/GLOB SERPL: 0.7 (ref 1.1–2.2)
ALP SERPL-CCNC: 76 U/L (ref 45–117)
ALT SERPL-CCNC: 12 U/L (ref 12–78)
ANION GAP SERPL CALC-SCNC: 5 MMOL/L (ref 5–15)
AST SERPL-CCNC: 44 U/L (ref 15–37)
BASO+EOS+MONOS # BLD AUTO: 0.5 K/UL (ref 0.2–1.2)
BASO+EOS+MONOS NFR BLD AUTO: 14 % (ref 3.2–16.9)
BILIRUB SERPL-MCNC: 0.3 MG/DL (ref 0.2–1)
BUN SERPL-MCNC: 6 MG/DL (ref 6–20)
BUN/CREAT SERPL: 10 (ref 12–20)
CALCIUM SERPL-MCNC: 8.8 MG/DL (ref 8.5–10.1)
CHLORIDE SERPL-SCNC: 108 MMOL/L (ref 97–108)
CO2 SERPL-SCNC: 28 MMOL/L (ref 21–32)
CREAT SERPL-MCNC: 0.6 MG/DL (ref 0.55–1.02)
DIFFERENTIAL METHOD BLD: ABNORMAL
ERYTHROCYTE [DISTWIDTH] IN BLOOD BY AUTOMATED COUNT: 18.2 % (ref 11.8–15.8)
GLOBULIN SER CALC-MCNC: 4.1 G/DL (ref 2–4)
GLUCOSE SERPL-MCNC: 161 MG/DL (ref 65–100)
HCT VFR BLD AUTO: 36.2 % (ref 35–47)
HGB BLD-MCNC: 11.2 G/DL (ref 11.5–16)
LYMPHOCYTES # BLD: 1 K/UL (ref 0.8–3.5)
LYMPHOCYTES NFR BLD: 28 % (ref 12–49)
MCH RBC QN AUTO: 30.4 PG (ref 26–34)
MCHC RBC AUTO-ENTMCNC: 30.9 G/DL (ref 30–36.5)
MCV RBC AUTO: 98.1 FL (ref 80–99)
NEUTS SEG # BLD: 2.2 K/UL (ref 1.8–8)
NEUTS SEG NFR BLD: 58 % (ref 32–75)
PLATELET # BLD AUTO: 487 K/UL (ref 150–400)
POTASSIUM SERPL-SCNC: 3.1 MMOL/L (ref 3.5–5.1)
PROT SERPL-MCNC: 7 G/DL (ref 6.4–8.2)
RBC # BLD AUTO: 3.69 M/UL (ref 3.8–5.2)
SODIUM SERPL-SCNC: 141 MMOL/L (ref 136–145)
WBC # BLD AUTO: 3.7 K/UL (ref 3.6–11)

## 2024-06-04 PROCEDURE — 99215 OFFICE O/P EST HI 40 MIN: CPT | Performed by: INTERNAL MEDICINE

## 2024-06-04 PROCEDURE — 80053 COMPREHEN METABOLIC PANEL: CPT

## 2024-06-04 PROCEDURE — 85025 COMPLETE CBC W/AUTO DIFF WBC: CPT

## 2024-06-04 PROCEDURE — 36591 DRAW BLOOD OFF VENOUS DEVICE: CPT

## 2024-06-04 PROCEDURE — 99215 OFFICE O/P EST HI 40 MIN: CPT | Performed by: NURSE PRACTITIONER

## 2024-06-04 PROCEDURE — 93971 EXTREMITY STUDY: CPT

## 2024-06-04 PROCEDURE — 36415 COLL VENOUS BLD VENIPUNCTURE: CPT

## 2024-06-04 ASSESSMENT — PAIN DESCRIPTION - ORIENTATION: ORIENTATION: RIGHT

## 2024-06-04 ASSESSMENT — PAIN DESCRIPTION - LOCATION: LOCATION: ARM

## 2024-06-04 ASSESSMENT — PAIN SCALES - GENERAL: PAINLEVEL_OUTOF10: 6

## 2024-06-04 ASSESSMENT — PAIN DESCRIPTION - DESCRIPTORS: DESCRIPTORS: ACHING

## 2024-06-04 NOTE — PROGRESS NOTES
Debra Moser is a 64 y.o. female follow up for breast cancer.    1. Have you been to the ER, urgent care clinic since your last visit?  Hospitalized since your last visit?no    2. Have you seen or consulted any other health care providers outside of the [facility] since your last visit?  Include any pap smears or colon screening. No      
as  well as lymphangitic spread of malignancy in the right lung and left lower lobe.  2.  Stable hypodense lesion at the dome of the liver.  3.  No new lymphadenopathy.    3/28/24 CT c/a/p     IMPRESSION:  1.  Stable exam with regards to interstitial pulmonary metastases.  2.  Increased right pleural effusion, still small.  3.  Stable liver lesions at the dome of the liver.  4.  No new metastases.  5.  No osseous metastatic disease    5/1/24 bone scan  FINDINGS: Degenerative changes again noted in the right shoulder. Old bilateral  rib fractures unchanged. Degenerative changes lumbosacral spine. Stable activity  left greater trochanter. There is no new abnormal uptake.  Incidental renal  imaging shows no abnormality.      IMPRESSION:  Stable whole-body bone scan without new scintigraphic evidence of  osseous metastatic disease.    5/1/24 CT c/a/p   IMPRESSION:  1. When compared with 3/28/2024 there are new dense areas of consolidation in  the superior segment of the right lower lobe and the anteromedial segment left  lower lobe and an increasing degree of consolidation medially in the right upper  lobe. These findings are most likely related to pneumonia or infectious or  inflammatory process.  2. Bilateral interstitial nodularity consistent with metastases has not changed  significantly. There is still evidence for lymphangitic spread of tumor in the  right upper lobe.  3. There is a moderate size right pleural effusion which has increased in the  interval. Please see above text.     FINDINGS:   LOWER THORAX: Chest findings are reported separately.   LIVER: Unchanged 2.3 and 1.6 cm low-attenuation masses in the right hepatic  lobe. No new liver mass is evident.  BILIARY TREE: Gallbladder is within normal limits. CBD is not dilated.  SPLEEN: within normal limits.  PANCREAS: No mass or ductal dilatation.  ADRENALS: Unremarkable.  KIDNEYS: No mass, calculus, or hydronephrosis. Unchanged subcentimeter bilateral  renal

## 2024-06-04 NOTE — PROGRESS NOTES
[facility] VISIT NOTE  Date: 2024    Name: Debra Moser    MRN: [id number]         : 1960    Ms. Lobito Moser Arrived ambulatory and in no distress for C6D1 of Enhertu+ Zometa (HELD both) Regimen.  Assessment was completed, no acute issues at this time, pt c/o right upper arm pain 6/10 and more cough and SOB with coughing.  Chest wall port accessed without difficulty with +blood return by the assessment nurse, labs drawn & sent for processing. Pt proceeded to MD appointment with medical  oncology.     Holding today treatment per JAREN Byrd. Provided AVS and pt aware of Venous scan today at the [facility] per order.         Ms. Lobito Moser's vitals were reviewed.  Patient Vitals for the past 12 hrs:   Temp Pulse Resp BP SpO2   24 0945 98.1 °F (36.7 °C) 84 17 111/70 94 %         Lab results were obtained and reviewed.  Recent Results (from the past 12 hour(s))   Comprehensive metabolic panel    Collection Time: 24  9:59 AM   Result Value Ref Range    Sodium 141 136 - 145 mmol/L    Potassium 3.1 (L) 3.5 - 5.1 mmol/L    Chloride 108 97 - 108 mmol/L    CO2 28 21 - 32 mmol/L    Anion Gap 5 5 - 15 mmol/L    Glucose 161 (H) 65 - 100 mg/dL    BUN 6 6 - 20 MG/DL    Creatinine 0.60 0.55 - 1.02 MG/DL    BUN/Creatinine Ratio 10 (L) 12 - 20      Est, Glom Filt Rate >90 >60 ml/min/1.73m2    Calcium 8.8 8.5 - 10.1 MG/DL    Total Bilirubin 0.3 0.2 - 1.0 MG/DL    ALT 12 12 - 78 U/L    AST 44 (H) 15 - 37 U/L    Alk Phosphatase 76 45 - 117 U/L    Total Protein 7.0 6.4 - 8.2 g/dL    Albumin 2.9 (L) 3.5 - 5.0 g/dL    Globulin 4.1 (H) 2.0 - 4.0 g/dL    Albumin/Globulin Ratio 0.7 (L) 1.1 - 2.2     CBC with Partial Differential    Collection Time: 06/04/24 10:00 AM   Result Value Ref Range    WBC 3.7 3.6 - 11.0 K/uL    RBC 3.69 (L) 3.80 - 5.20 M/uL    Hemoglobin 11.2 (L) 11.5 - 16.0 g/dL    Hematocrit 36.2 35.0 - 47.0 %    MCV 98.1 80.0 - 99.0 FL    MCH 30.4 26.0 - 34.0 PG    MCHC 30.9  30.0 - 36.5 g/dL    RDW 18.2 (H) 11.8 - 15.8 %    Platelets 487 (H) 150 - 400 K/uL    Neutrophils % 58 32 - 75 %    Mixed Cells 14 3.2 - 16.9 %    Lymphocytes % 28 12 - 49 %    Neutrophils Absolute 2.2 1.8 - 8.0 K/UL    ABSOLUTE MIXED CELLS 0.5 0.2 - 1.2 K/uL    Lymphocytes Absolute 1.0 0.8 - 3.5 K/UL    Differential Type AUTOMATED           Ms. Lobito Moser tolerated treatment well and was [facility] in stable condition at 1230.   Port flushed and de-accessed per protocol. She is to return on  June 25, 2024 at 1000 for her next appointment.    Nadja Webb RN  June 4, 2024

## 2024-06-05 ENCOUNTER — HOSPITAL ENCOUNTER (OUTPATIENT)
Facility: HOSPITAL | Age: 64
Discharge: HOME | End: 2024-06-08

## 2024-06-05 DIAGNOSIS — C50.811 MALIGNANT NEOPLASM OF OVERLAPPING SITES OF RIGHT BREAST IN FEMALE, ESTROGEN RECEPTOR NEGATIVE (HCC): ICD-10-CM

## 2024-06-05 DIAGNOSIS — R05.1 ACUTE COUGH: ICD-10-CM

## 2024-06-05 DIAGNOSIS — R93.89 ABNORMAL CT OF THE CHEST: ICD-10-CM

## 2024-06-05 DIAGNOSIS — Z17.1 MALIGNANT NEOPLASM OF OVERLAPPING SITES OF RIGHT BREAST IN FEMALE, ESTROGEN RECEPTOR NEGATIVE (HCC): ICD-10-CM

## 2024-06-05 LAB — ECHO BSA: 1.64 M2

## 2024-06-05 PROCEDURE — 71250 CT THORAX DX C-: CPT

## 2024-06-10 DIAGNOSIS — C50.811 MALIGNANT NEOPLASM OF OVERLAPPING SITES OF RIGHT BREAST IN FEMALE, ESTROGEN RECEPTOR NEGATIVE (HCC): Primary | ICD-10-CM

## 2024-06-10 DIAGNOSIS — C50.911 CARCINOMA OF RIGHT BREAST METASTATIC TO BONE (HCC): ICD-10-CM

## 2024-06-10 DIAGNOSIS — C78.00 BREAST CANCER METASTASIZED TO LUNG, RIGHT (HCC): ICD-10-CM

## 2024-06-10 DIAGNOSIS — J91.0 MALIGNANT PLEURAL EFFUSION: ICD-10-CM

## 2024-06-10 DIAGNOSIS — C79.51 CARCINOMA OF RIGHT BREAST METASTATIC TO BONE (HCC): ICD-10-CM

## 2024-06-10 DIAGNOSIS — C50.911 BREAST CANCER METASTASIZED TO LUNG, RIGHT (HCC): ICD-10-CM

## 2024-06-10 DIAGNOSIS — Z17.1 MALIGNANT NEOPLASM OF OVERLAPPING SITES OF RIGHT BREAST IN FEMALE, ESTROGEN RECEPTOR NEGATIVE (HCC): Primary | ICD-10-CM

## 2024-06-11 ENCOUNTER — OFFICE VISIT (OUTPATIENT)
Age: 64
End: 2024-06-11
Payer: SUBSIDIZED

## 2024-06-11 ENCOUNTER — HOSPITAL ENCOUNTER (OUTPATIENT)
Facility: HOSPITAL | Age: 64
Setting detail: INFUSION SERIES
Discharge: HOME | End: 2024-06-11
Payer: SUBSIDIZED

## 2024-06-11 VITALS
WEIGHT: 134 LBS | RESPIRATION RATE: 16 BRPM | DIASTOLIC BLOOD PRESSURE: 70 MMHG | BODY MASS INDEX: 24.51 KG/M2 | TEMPERATURE: 98.9 F | SYSTOLIC BLOOD PRESSURE: 114 MMHG | OXYGEN SATURATION: 97 % | HEART RATE: 88 BPM

## 2024-06-11 VITALS
DIASTOLIC BLOOD PRESSURE: 70 MMHG | WEIGHT: 134.7 LBS | HEIGHT: 62 IN | SYSTOLIC BLOOD PRESSURE: 114 MMHG | BODY MASS INDEX: 24.79 KG/M2 | HEART RATE: 88 BPM | RESPIRATION RATE: 18 BRPM | TEMPERATURE: 98.9 F | OXYGEN SATURATION: 96 %

## 2024-06-11 DIAGNOSIS — C50.919 MALIGNANT NEOPLASM OF BREAST IN FEMALE, ESTROGEN RECEPTOR NEGATIVE, UNSPECIFIED LATERALITY, UNSPECIFIED SITE OF BREAST (HCC): Primary | ICD-10-CM

## 2024-06-11 DIAGNOSIS — C50.919 CARCINOMA OF BREAST METASTATIC TO BONE, UNSPECIFIED LATERALITY (HCC): ICD-10-CM

## 2024-06-11 DIAGNOSIS — Z17.1 MALIGNANT NEOPLASM OF OVERLAPPING SITES OF RIGHT BREAST IN FEMALE, ESTROGEN RECEPTOR NEGATIVE (HCC): Primary | ICD-10-CM

## 2024-06-11 DIAGNOSIS — Z17.1 MALIGNANT NEOPLASM OF BREAST IN FEMALE, ESTROGEN RECEPTOR NEGATIVE, UNSPECIFIED LATERALITY, UNSPECIFIED SITE OF BREAST (HCC): Primary | ICD-10-CM

## 2024-06-11 DIAGNOSIS — C79.51 CARCINOMA OF BREAST METASTATIC TO BONE, UNSPECIFIED LATERALITY (HCC): ICD-10-CM

## 2024-06-11 DIAGNOSIS — C50.811 MALIGNANT NEOPLASM OF OVERLAPPING SITES OF RIGHT BREAST IN FEMALE, ESTROGEN RECEPTOR NEGATIVE (HCC): Primary | ICD-10-CM

## 2024-06-11 DIAGNOSIS — Z51.11 ENCOUNTER FOR ANTINEOPLASTIC CHEMOTHERAPY: ICD-10-CM

## 2024-06-11 LAB
ALBUMIN SERPL-MCNC: 3 G/DL (ref 3.5–5)
ALBUMIN/GLOB SERPL: 0.7 (ref 1.1–2.2)
ALP SERPL-CCNC: 80 U/L (ref 45–117)
ALT SERPL-CCNC: 11 U/L (ref 12–78)
ANION GAP SERPL CALC-SCNC: 4 MMOL/L (ref 5–15)
AST SERPL-CCNC: 38 U/L (ref 15–37)
BASOPHILS # BLD: 0.2 K/UL (ref 0–0.1)
BASOPHILS NFR BLD: 3 % (ref 0–1)
BILIRUB SERPL-MCNC: 0.3 MG/DL (ref 0.2–1)
BUN SERPL-MCNC: 8 MG/DL (ref 6–20)
BUN/CREAT SERPL: 13 (ref 12–20)
CALCIUM SERPL-MCNC: 9.2 MG/DL (ref 8.5–10.1)
CHLORIDE SERPL-SCNC: 106 MMOL/L (ref 97–108)
CO2 SERPL-SCNC: 30 MMOL/L (ref 21–32)
CREAT SERPL-MCNC: 0.62 MG/DL (ref 0.55–1.02)
DIFFERENTIAL METHOD BLD: ABNORMAL
EOSINOPHIL # BLD: 0.2 K/UL (ref 0–0.4)
EOSINOPHIL NFR BLD: 3 % (ref 0–7)
ERYTHROCYTE [DISTWIDTH] IN BLOOD BY AUTOMATED COUNT: 16.5 % (ref 11.5–14.5)
GLOBULIN SER CALC-MCNC: 4.2 G/DL (ref 2–4)
GLUCOSE SERPL-MCNC: 156 MG/DL (ref 65–100)
HCT VFR BLD AUTO: 37.5 % (ref 35–47)
HGB BLD-MCNC: 11.6 G/DL (ref 11.5–16)
IMM GRANULOCYTES # BLD AUTO: 0 K/UL (ref 0–0.04)
IMM GRANULOCYTES NFR BLD AUTO: 0 % (ref 0–0.5)
LYMPHOCYTES # BLD: 1.3 K/UL (ref 0.8–3.5)
LYMPHOCYTES NFR BLD: 20 % (ref 12–49)
MCH RBC QN AUTO: 30.2 PG (ref 26–34)
MCHC RBC AUTO-ENTMCNC: 30.9 G/DL (ref 30–36.5)
MCV RBC AUTO: 97.7 FL (ref 80–99)
MONOCYTES # BLD: 0.3 K/UL (ref 0–1)
MONOCYTES NFR BLD: 5 % (ref 5–13)
NEUTS BAND NFR BLD MANUAL: 1 %
NEUTS SEG # BLD: 4.5 K/UL (ref 1.8–8)
NEUTS SEG NFR BLD: 68 % (ref 32–75)
NRBC # BLD: 0 K/UL (ref 0–0.01)
NRBC BLD-RTO: 0 PER 100 WBC
PLATELET # BLD AUTO: 306 K/UL (ref 150–400)
PMV BLD AUTO: 8.6 FL (ref 8.9–12.9)
POTASSIUM SERPL-SCNC: 3.1 MMOL/L (ref 3.5–5.1)
PROT SERPL-MCNC: 7.2 G/DL (ref 6.4–8.2)
RBC # BLD AUTO: 3.84 M/UL (ref 3.8–5.2)
RBC MORPH BLD: ABNORMAL
RBC MORPH BLD: ABNORMAL
SODIUM SERPL-SCNC: 140 MMOL/L (ref 136–145)
WBC # BLD AUTO: 6.5 K/UL (ref 3.6–11)

## 2024-06-11 PROCEDURE — 85025 COMPLETE CBC W/AUTO DIFF WBC: CPT

## 2024-06-11 PROCEDURE — 6370000000 HC RX 637 (ALT 250 FOR IP): Performed by: NURSE PRACTITIONER

## 2024-06-11 PROCEDURE — 99215 OFFICE O/P EST HI 40 MIN: CPT | Performed by: INTERNAL MEDICINE

## 2024-06-11 PROCEDURE — 80053 COMPREHEN METABOLIC PANEL: CPT

## 2024-06-11 PROCEDURE — 36591 DRAW BLOOD OFF VENOUS DEVICE: CPT

## 2024-06-11 RX ORDER — METHYLPREDNISOLONE 4 MG/1
TABLET ORAL
Qty: 21 TABLET | Refills: 0 | Status: SHIPPED | OUTPATIENT
Start: 2024-06-11 | End: 2024-06-17

## 2024-06-11 RX ORDER — POTASSIUM CHLORIDE 750 MG/1
60 TABLET, FILM COATED, EXTENDED RELEASE ORAL ONCE
Status: COMPLETED | OUTPATIENT
Start: 2024-06-11 | End: 2024-06-11

## 2024-06-11 RX ADMIN — POTASSIUM CHLORIDE 60 MEQ: 750 TABLET, FILM COATED, EXTENDED RELEASE ORAL at 12:38

## 2024-06-11 ASSESSMENT — PAIN DESCRIPTION - ORIENTATION: ORIENTATION: RIGHT

## 2024-06-11 ASSESSMENT — PAIN SCALES - GENERAL: PAINLEVEL_OUTOF10: 5

## 2024-06-11 ASSESSMENT — PAIN DESCRIPTION - DESCRIPTORS: DESCRIPTORS: ACHING

## 2024-06-11 ASSESSMENT — PAIN DESCRIPTION - LOCATION: LOCATION: ARM

## 2024-06-11 NOTE — PROGRESS NOTES
[facility] [place] [facility]  [phone]    06/11/24 Chemotherapy/Immunotherapy/Targeted Therapy education Margetixumab and Eribulin provided via  Michelle. Educational handouts pertaining to the treatment and side effects were provided to the patient. All of the patient's questions and concerns were answered. Consent was discussed with the patient and the patient denied any questions or concerns. Consent form was signed. A hard copy of the signed consent form was offered to the patient and the patient accepted.   
Debra Moser is a 64 y.o. female here today to follow up for breast cancer    1. Have you been to the ER, urgent care clinic since your last visit?  Hospitalized since your last visit?no    2. Have you seen or consulted any other health care providers outside of the [facility] since your last visit?  Include any pap smears or colon screening. no    
difficult to exclude. Minimally hypermetabolic old left  rib fracture. Diffuse uptake involving the right shoulder joint may be  inflammatory in nature. There is otherwise no PET avid evidence to suggest  metastatic disease.    CT C/A/P 9/26/23  IMPRESSION:  3 liver lesions as described above, only one of which can be characterized  convincingly as a hemangioma. The other 2 are of uncertain etiology but are not  hypermetabolic on recent PET/CT. Bilateral pulmonary infiltrates are stable  compared to the prior PET/CT, close follow-up is recommended to ensure lack of  underlying neoplasm.    Bone Scan 9/25/23  IMPRESSION:  1. There is marked increased activity overlying right humeral head and AC joint  most likely degenerative. Continued close follow-up would be helpful.  2. Activity left 10th rib posteriorly is consistent with a fracture. Slight  activity left greater trochanter is most likely related to tendinopathy. A  definite pattern of bony metastatic disease is not identified    1/20/24 bone scan  FINDINGS: Increased activity in the right humeral head is unchanged and may be  degenerative.. Slight spotty activity thoracic spine is unchanged most likely  degenerative.. Slight activity left greater trochanter is unchanged and could be  related to tendinopathy... There is a new focus of activity left sixth rib  laterally and left eighth rib anteriorly which are slightly sclerotic on CT..   Incidental renal imaging shows no abnormality.      IMPRESSION:  There are 2 new foci of activity in the right sixth rib and left  eighth rib which are sclerotic on CT and could represent bony metastatic lesions  and close follow-up is recommended.. Activity in the right humeral head is  stable. Slight activity thoracic spine and left greater trochanter are unchanged                1/20/24 CT c/a/p  IMPRESSION:  1.  Progression of infiltrative metastasis in the medial right upper lobe as  well as lymphangitic spread of

## 2024-06-11 NOTE — PROGRESS NOTES
[facility] Chemo Progress Note    Date: June 11, 2024    1000am: Ms. Lobito Moser Arrived ambulatory and in stable condition to the [facility] for C6D1 Enhertu Regimen (HELD).  Assessment was completed, Patient reported worsening cough, increased fatigue and SOB with exertion. Port accessed with positive blood return. Labs drawn and sent for processing. Went to provider appointment with Medical Oncology. Returned from provider appointment. Labs reviewed.      #[id number] used.     Treatment HELD today. Treatment switched to a new treatment for next week by MD. Potassium 3.1 today. Oral Potassium ordered by MD and given.    Ms. Lobito Moser's vitals were reviewed.  Patient Vitals for the past 12 hrs:   Temp Pulse Resp BP SpO2   06/11/24 1000 98.9 °F (37.2 °C) 88 18 114/70 96 %     Lab results were obtained and reviewed.  Recent Results (from the past 12 hour(s))   CBC With Auto Differential    Collection Time: 06/11/24 10:15 AM   Result Value Ref Range    WBC 6.5 3.6 - 11.0 K/uL    RBC 3.84 3.80 - 5.20 M/uL    Hemoglobin 11.6 11.5 - 16.0 g/dL    Hematocrit 37.5 35.0 - 47.0 %    MCV 97.7 80.0 - 99.0 FL    MCH 30.2 26.0 - 34.0 PG    MCHC 30.9 30.0 - 36.5 g/dL    RDW 16.5 (H) 11.5 - 14.5 %    Platelets 306 150 - 400 K/uL    MPV 8.6 (L) 8.9 - 12.9 FL    Nucleated RBCs 0.0 0  WBC    nRBC 0.00 0.00 - 0.01 K/uL    Neutrophils % 68 32 - 75 %    Band Neutrophils 1 %    Lymphocytes % 20 12 - 49 %    Monocytes % 5 5 - 13 %    Eosinophils % 3 0 - 7 %    Basophils % 3 (H) 0 - 1 %    Immature Granulocytes % 0 0.0 - 0.5 %    Neutrophils Absolute 4.5 1.8 - 8.0 K/UL    Lymphocytes Absolute 1.3 0.8 - 3.5 K/UL    Monocytes Absolute 0.3 0.0 - 1.0 K/UL    Eosinophils Absolute 0.2 0.0 - 0.4 K/UL    Basophils Absolute 0.2 (H) 0.0 - 0.1 K/UL    Immature Granulocytes Absolute 0.0 0.00 - 0.04 K/UL    Differential Type MANUAL      RBC Comment ANISOCYTOSIS  1+        RBC Comment MACROCYTOSIS  1+       Comprehensive metabolic panel

## 2024-06-14 ENCOUNTER — HOSPITAL ENCOUNTER (OUTPATIENT)
Facility: HOSPITAL | Age: 64
Discharge: HOME | End: 2024-06-14
Attending: INTERNAL MEDICINE

## 2024-06-14 ENCOUNTER — HOSPITAL ENCOUNTER (OUTPATIENT)
Facility: HOSPITAL | Age: 64
End: 2024-06-14
Attending: INTERNAL MEDICINE

## 2024-06-14 VITALS
DIASTOLIC BLOOD PRESSURE: 72 MMHG | RESPIRATION RATE: 16 BRPM | SYSTOLIC BLOOD PRESSURE: 139 MMHG | HEART RATE: 87 BPM | OXYGEN SATURATION: 96 %

## 2024-06-14 DIAGNOSIS — C50.811 MALIGNANT NEOPLASM OF OVERLAPPING SITES OF RIGHT BREAST IN FEMALE, ESTROGEN RECEPTOR NEGATIVE (HCC): ICD-10-CM

## 2024-06-14 DIAGNOSIS — C78.00 BREAST CANCER METASTASIZED TO LUNG, RIGHT (HCC): ICD-10-CM

## 2024-06-14 DIAGNOSIS — J91.0 MALIGNANT PLEURAL EFFUSION: ICD-10-CM

## 2024-06-14 DIAGNOSIS — Z17.1 MALIGNANT NEOPLASM OF OVERLAPPING SITES OF RIGHT BREAST IN FEMALE, ESTROGEN RECEPTOR NEGATIVE (HCC): ICD-10-CM

## 2024-06-14 DIAGNOSIS — C50.911 CARCINOMA OF RIGHT BREAST METASTATIC TO BONE (HCC): ICD-10-CM

## 2024-06-14 DIAGNOSIS — C79.51 CARCINOMA OF RIGHT BREAST METASTATIC TO BONE (HCC): ICD-10-CM

## 2024-06-14 DIAGNOSIS — C50.911 BREAST CANCER METASTASIZED TO LUNG, RIGHT (HCC): ICD-10-CM

## 2024-06-14 PROCEDURE — 32555 ASPIRATE PLEURA W/ IMAGING: CPT

## 2024-06-14 PROCEDURE — 88112 CYTOPATH CELL ENHANCE TECH: CPT

## 2024-06-14 PROCEDURE — 88342 IMHCHEM/IMCYTCHM 1ST ANTB: CPT

## 2024-06-14 PROCEDURE — 2500000003 HC RX 250 WO HCPCS: Performed by: INTERNAL MEDICINE

## 2024-06-14 PROCEDURE — 88341 IMHCHEM/IMCYTCHM EA ADD ANTB: CPT

## 2024-06-14 PROCEDURE — 88360 TUMOR IMMUNOHISTOCHEM/MANUAL: CPT

## 2024-06-14 PROCEDURE — 88305 TISSUE EXAM BY PATHOLOGIST: CPT

## 2024-06-14 PROCEDURE — 71045 X-RAY EXAM CHEST 1 VIEW: CPT

## 2024-06-14 RX ORDER — LIDOCAINE HYDROCHLORIDE 10 MG/ML
10 INJECTION, SOLUTION EPIDURAL; INFILTRATION; INTRACAUDAL; PERINEURAL ONCE
Status: COMPLETED | OUTPATIENT
Start: 2024-06-14 | End: 2024-06-14

## 2024-06-14 RX ADMIN — LIDOCAINE HYDROCHLORIDE 10 ML: 10 INJECTION, SOLUTION EPIDURAL; INFILTRATION; INTRACAUDAL; PERINEURAL at 13:57

## 2024-06-14 NOTE — DISCHARGE INSTRUCTIONS
Toracocentesis: Qué esperar en el hogar  Thoracentesis: What to Expect at Home  Cueto recuperación  La toracocentesis es un procedimiento para extraer líquido del espacio entre los pulmones y la pared torácica (espacio pleural). Liz procedimiento también puede llamarse \"punción torácica\". Es normal tener juan c pequeña cantidad de líquido en el espacio pleural. Mehrdad se puede acumular demasiado líquido debido a problemas dexter infección, insuficiencia cardíaca o cáncer de pulmón. Es posible que el procedimiento se haya realizado para ayudar con la falta de aire y el dolor causado por la acumulación de líquido. O puede que le hayan hecho liz procedimiento para que el médico pueda analizar el líquido y encontrar la causa de la acumulación.  Cueto pecho podría estar adolorido en el lugar donde el médico insertó la aguja o el catéter a través de la piel (el sitio de punción). Por lo general, esto mejora después de mady o dos días. Puede volver al trabajo o deborah actividades habituales en cuanto se sienta capaz de hacerlo.  Si se eliminó juan c gran cantidad de líquido pleural orquidea el procedimiento, probablemente podrá respirar más fácilmente.  Si se acumula más líquido pleural y es necesario extraerlo, se puede realizar otra toracocentesis más tarde.  Esta hoja de cuidados le da juan c idea general de cuánto tiempo tardará en recuperarse. Sin embargo, cada persona se recupera a un ritmo diferente. Siga los pasos siguientes para sentirse mejor garay pronto dexter sea posible.  ¿Cómo puede cuidarse en el hogar?  Actividad    Descanse cuando se sienta cansado. Dormir lo suficiente le ayudará a recuperarse.     Evite las actividades vigorosas, dexter montar en bicicleta, trotar, levantar pesas o hacer ejercicios aeróbicos, hasta que cueot médico lo autorice.     Puede ducharse. No tome usama de inmersión en juan c shannon hasta que el sitio de punción haya sanado o hasta que cueto médico lo apruebe.     Pregúntele a cueto médico cuándo puede volver a  es juan c buena idea saber los resultados de deborah exámenes y mantener juan c lista de los medicamentos que santi.  ¿Cuándo debe pedir ayuda?   Llame al 911 en cualquier momento que considere que necesita atención de emergencia. Por ejemplo, llame si:    Se desmayó (perdió el conocimiento).     Tiene graves dificultades para respirar.     Tiene dolor repentino en el pecho y falta de aire, o tose finn.   Llame a cueto médico ahora mismo o busque atención médica inmediata si:    Tiene nueva falta de aire o juan c falta de aire que empeora.     Tiene nuevo dolor de pecho o éste empeora, especialmente al respirar profundamente.     Siente el estómago revuelto o no puede retener líquidos en el estómago.     Tiene fiebre de más de 100°F (37.8°C).     El vendaje que cubre el sitio de punción está empapado con finn de color head vivo.     Tiene señales de infección, tales dexter:  Aumento del dolor, la hinchazón, el enrojecimiento o la temperatura.  Vetas rojizas que comienzan donde está el sitio de punción.  Pus que supura del sitio de punción.  Ganglios linfáticos inflamados en el chad, las axilas o la ronaldo.  Fiebre.     Al toser expectora mucha más mucosidad (flema) que de costumbre o la mucosidad cambia de color.   Preste especial atención a los cambios en cueto osiel y asegúrese de comunicarse con cueto médico si tiene algún problema.  ¿Dónde puede encontrar más información?  Vaya a https://spanishkb.healthwise.net/patientedes e escriba Q755 para más información sobre \"Toracocentesis: Qué esperar en el hogar.\"  Revisado: 6 agosto, 2023  Versión del contenido: 14.1  © 2023-7002 [employer], [employer].   Las instrucciones de cuidado fueron adaptadas bajo licencia por [facility]. Si usted tiene preguntas sobre juan c afección médica o sobre estas instrucciones, siempre pregunte a cueto profesional de osiel. [employer] niega toda garantía o responsabilidad por cueto uso de esta información.

## 2024-06-14 NOTE — PROGRESS NOTES
1310  Patient found on stretcher in ultrasound with  on stratus. Patient identified with two identifiers. Stratus used for consent with consent obtained with patient and Dr. Lion.   1325   Dr. Lion at bedside and for  procedure. Time out 1330. Start time 1330 End Time  1349. Right lung field preped and draped with a total 1500 hazy pinkish red fluid removed and sample sent to lab. VSS with mild coughing during the procedure but tolerated well.   1350   Chest xray ordered post right Thoracentesis.  1358   Portable chest ray shot.   1409 Patient dressed for discharge home with  family discharge papers printed and given in Bermudian. used session code [id number].

## 2024-06-17 LAB — CYTOLOGY-NON GYN: NORMAL

## 2024-06-18 ENCOUNTER — OFFICE VISIT (OUTPATIENT)
Age: 64
End: 2024-06-18

## 2024-06-18 ENCOUNTER — HOSPITAL ENCOUNTER (OUTPATIENT)
Facility: HOSPITAL | Age: 64
Setting detail: INFUSION SERIES
Discharge: HOME | End: 2024-06-18

## 2024-06-18 VITALS
HEIGHT: 62 IN | HEART RATE: 94 BPM | DIASTOLIC BLOOD PRESSURE: 57 MMHG | RESPIRATION RATE: 18 BRPM | BODY MASS INDEX: 24.18 KG/M2 | SYSTOLIC BLOOD PRESSURE: 118 MMHG | WEIGHT: 131.4 LBS | OXYGEN SATURATION: 94 % | TEMPERATURE: 96.9 F

## 2024-06-18 VITALS
DIASTOLIC BLOOD PRESSURE: 58 MMHG | RESPIRATION RATE: 18 BRPM | SYSTOLIC BLOOD PRESSURE: 116 MMHG | WEIGHT: 131.4 LBS | HEART RATE: 88 BPM | BODY MASS INDEX: 24.18 KG/M2 | TEMPERATURE: 96.9 F | HEIGHT: 62 IN | OXYGEN SATURATION: 94 %

## 2024-06-18 DIAGNOSIS — C50.811 MALIGNANT NEOPLASM OF OVERLAPPING SITES OF RIGHT BREAST IN FEMALE, ESTROGEN RECEPTOR NEGATIVE (HCC): Primary | ICD-10-CM

## 2024-06-18 DIAGNOSIS — C79.51 CARCINOMA OF BREAST METASTATIC TO BONE, UNSPECIFIED LATERALITY (HCC): ICD-10-CM

## 2024-06-18 DIAGNOSIS — C50.919 CARCINOMA OF BREAST METASTATIC TO BONE, UNSPECIFIED LATERALITY (HCC): ICD-10-CM

## 2024-06-18 DIAGNOSIS — C50.811 MALIGNANT NEOPLASM OF OVERLAPPING SITES OF RIGHT BREAST IN FEMALE, ESTROGEN RECEPTOR NEGATIVE (HCC): ICD-10-CM

## 2024-06-18 DIAGNOSIS — C50.919 MALIGNANT NEOPLASM OF BREAST IN FEMALE, ESTROGEN RECEPTOR NEGATIVE, UNSPECIFIED LATERALITY, UNSPECIFIED SITE OF BREAST (HCC): ICD-10-CM

## 2024-06-18 DIAGNOSIS — Z17.1 MALIGNANT NEOPLASM OF BREAST IN FEMALE, ESTROGEN RECEPTOR NEGATIVE, UNSPECIFIED LATERALITY, UNSPECIFIED SITE OF BREAST (HCC): ICD-10-CM

## 2024-06-18 DIAGNOSIS — C50.911 BREAST CANCER METASTASIZED TO LUNG, RIGHT (HCC): ICD-10-CM

## 2024-06-18 DIAGNOSIS — Z17.1 MALIGNANT NEOPLASM OF OVERLAPPING SITES OF RIGHT BREAST IN FEMALE, ESTROGEN RECEPTOR NEGATIVE (HCC): ICD-10-CM

## 2024-06-18 DIAGNOSIS — C79.51 CARCINOMA OF RIGHT BREAST METASTATIC TO BONE (HCC): ICD-10-CM

## 2024-06-18 DIAGNOSIS — Z51.11 ENCOUNTER FOR ANTINEOPLASTIC CHEMOTHERAPY: Primary | ICD-10-CM

## 2024-06-18 DIAGNOSIS — Z17.1 MALIGNANT NEOPLASM OF OVERLAPPING SITES OF RIGHT BREAST IN FEMALE, ESTROGEN RECEPTOR NEGATIVE (HCC): Primary | ICD-10-CM

## 2024-06-18 DIAGNOSIS — J91.0 MALIGNANT PLEURAL EFFUSION: ICD-10-CM

## 2024-06-18 DIAGNOSIS — C78.00 BREAST CANCER METASTASIZED TO LUNG, RIGHT (HCC): ICD-10-CM

## 2024-06-18 DIAGNOSIS — C50.911 CARCINOMA OF RIGHT BREAST METASTATIC TO BONE (HCC): ICD-10-CM

## 2024-06-18 LAB
ALBUMIN SERPL-MCNC: 3 G/DL (ref 3.5–5)
ALBUMIN/GLOB SERPL: 0.7 (ref 1.1–2.2)
ALP SERPL-CCNC: 77 U/L (ref 45–117)
ALT SERPL-CCNC: 10 U/L (ref 12–78)
ANION GAP SERPL CALC-SCNC: 2 MMOL/L (ref 5–15)
AST SERPL-CCNC: 23 U/L (ref 15–37)
BASOPHILS # BLD: 0.1 K/UL (ref 0–0.1)
BASOPHILS NFR BLD: 1 % (ref 0–1)
BILIRUB SERPL-MCNC: 0.3 MG/DL (ref 0.2–1)
BUN SERPL-MCNC: 16 MG/DL (ref 6–20)
BUN/CREAT SERPL: 24 (ref 12–20)
CALCIUM SERPL-MCNC: 9.2 MG/DL (ref 8.5–10.1)
CHLORIDE SERPL-SCNC: 103 MMOL/L (ref 97–108)
CO2 SERPL-SCNC: 32 MMOL/L (ref 21–32)
CREAT SERPL-MCNC: 0.68 MG/DL (ref 0.55–1.02)
DIFFERENTIAL METHOD BLD: ABNORMAL
EOSINOPHIL # BLD: 0 K/UL (ref 0–0.4)
EOSINOPHIL NFR BLD: 0 % (ref 0–7)
ERYTHROCYTE [DISTWIDTH] IN BLOOD BY AUTOMATED COUNT: 16.3 % (ref 11.5–14.5)
GLOBULIN SER CALC-MCNC: 4.3 G/DL (ref 2–4)
GLUCOSE SERPL-MCNC: 193 MG/DL (ref 65–100)
HBV SURFACE AB SER QL: NONREACTIVE
HBV SURFACE AB SER-ACNC: <3.1 MIU/ML
HBV SURFACE AG SER QL: <0.1 INDEX
HBV SURFACE AG SER QL: NEGATIVE
HCT VFR BLD AUTO: 38.9 % (ref 35–47)
HGB BLD-MCNC: 12.2 G/DL (ref 11.5–16)
IMM GRANULOCYTES # BLD AUTO: 0.1 K/UL (ref 0–0.04)
IMM GRANULOCYTES NFR BLD AUTO: 1 % (ref 0–0.5)
LYMPHOCYTES # BLD: 1 K/UL (ref 0.8–3.5)
LYMPHOCYTES NFR BLD: 9 % (ref 12–49)
MAGNESIUM SERPL-MCNC: 2.1 MG/DL (ref 1.6–2.4)
MCH RBC QN AUTO: 29.7 PG (ref 26–34)
MCHC RBC AUTO-ENTMCNC: 31.4 G/DL (ref 30–36.5)
MCV RBC AUTO: 94.6 FL (ref 80–99)
MONOCYTES # BLD: 0.6 K/UL (ref 0–1)
MONOCYTES NFR BLD: 5 % (ref 5–13)
NEUTS SEG # BLD: 9 K/UL (ref 1.8–8)
NEUTS SEG NFR BLD: 84 % (ref 32–75)
NRBC # BLD: 0 K/UL (ref 0–0.01)
NRBC BLD-RTO: 0 PER 100 WBC
PLATELET # BLD AUTO: 300 K/UL (ref 150–400)
PMV BLD AUTO: 10 FL (ref 8.9–12.9)
POTASSIUM SERPL-SCNC: 3.3 MMOL/L (ref 3.5–5.1)
PROT SERPL-MCNC: 7.3 G/DL (ref 6.4–8.2)
RBC # BLD AUTO: 4.11 M/UL (ref 3.8–5.2)
SODIUM SERPL-SCNC: 137 MMOL/L (ref 136–145)
WBC # BLD AUTO: 10.7 K/UL (ref 3.6–11)

## 2024-06-18 PROCEDURE — 87340 HEPATITIS B SURFACE AG IA: CPT

## 2024-06-18 PROCEDURE — 96413 CHEMO IV INFUSION 1 HR: CPT

## 2024-06-18 PROCEDURE — 85025 COMPLETE CBC W/AUTO DIFF WBC: CPT

## 2024-06-18 PROCEDURE — 2580000003 HC RX 258: Performed by: INTERNAL MEDICINE

## 2024-06-18 PROCEDURE — 99215 OFFICE O/P EST HI 40 MIN: CPT | Performed by: INTERNAL MEDICINE

## 2024-06-18 PROCEDURE — 86704 HEP B CORE ANTIBODY TOTAL: CPT

## 2024-06-18 PROCEDURE — 6360000002 HC RX W HCPCS: Performed by: INTERNAL MEDICINE

## 2024-06-18 PROCEDURE — 96409 CHEMO IV PUSH SNGL DRUG: CPT

## 2024-06-18 PROCEDURE — 96415 CHEMO IV INFUSION ADDL HR: CPT

## 2024-06-18 PROCEDURE — 96367 TX/PROPH/DG ADDL SEQ IV INF: CPT

## 2024-06-18 PROCEDURE — 80053 COMPREHEN METABOLIC PANEL: CPT

## 2024-06-18 PROCEDURE — 6360000002 HC RX W HCPCS

## 2024-06-18 PROCEDURE — 86706 HEP B SURFACE ANTIBODY: CPT

## 2024-06-18 PROCEDURE — 96375 TX/PRO/DX INJ NEW DRUG ADDON: CPT

## 2024-06-18 PROCEDURE — 83735 ASSAY OF MAGNESIUM: CPT

## 2024-06-18 RX ORDER — SODIUM CHLORIDE 9 MG/ML
5-250 INJECTION, SOLUTION INTRAVENOUS PRN
Status: DISCONTINUED | OUTPATIENT
Start: 2024-06-18 | End: 2024-06-19 | Stop reason: HOSPADM

## 2024-06-18 RX ORDER — ONDANSETRON 2 MG/ML
8 INJECTION INTRAMUSCULAR; INTRAVENOUS
Status: DISCONTINUED | OUTPATIENT
Start: 2024-06-18 | End: 2024-06-19 | Stop reason: HOSPADM

## 2024-06-18 RX ORDER — FAMOTIDINE 10 MG/ML
20 INJECTION, SOLUTION INTRAVENOUS
Status: DISCONTINUED | OUTPATIENT
Start: 2024-06-18 | End: 2024-06-19 | Stop reason: HOSPADM

## 2024-06-18 RX ORDER — PROCHLORPERAZINE EDISYLATE 5 MG/ML
10 INJECTION INTRAMUSCULAR; INTRAVENOUS
OUTPATIENT
Start: 2024-06-25

## 2024-06-18 RX ORDER — ZOLEDRONIC ACID 0.04 MG/ML
4 INJECTION, SOLUTION INTRAVENOUS ONCE
Status: COMPLETED | OUTPATIENT
Start: 2024-06-18 | End: 2024-06-18

## 2024-06-18 RX ORDER — PROCHLORPERAZINE EDISYLATE 5 MG/ML
10 INJECTION INTRAMUSCULAR; INTRAVENOUS
Status: CANCELLED | OUTPATIENT
Start: 2024-06-18

## 2024-06-18 RX ORDER — FAMOTIDINE 10 MG/ML
20 INJECTION, SOLUTION INTRAVENOUS
OUTPATIENT
Start: 2024-06-25

## 2024-06-18 RX ORDER — HEPARIN SODIUM (PORCINE) LOCK FLUSH IV SOLN 100 UNIT/ML 100 UNIT/ML
500 SOLUTION INTRAVENOUS PRN
OUTPATIENT
Start: 2024-06-25

## 2024-06-18 RX ORDER — SODIUM CHLORIDE 9 MG/ML
INJECTION, SOLUTION INTRAVENOUS CONTINUOUS
Status: DISCONTINUED | OUTPATIENT
Start: 2024-06-18 | End: 2024-06-19 | Stop reason: HOSPADM

## 2024-06-18 RX ORDER — SODIUM CHLORIDE 0.9 % (FLUSH) 0.9 %
5-40 SYRINGE (ML) INJECTION PRN
Status: DISCONTINUED | OUTPATIENT
Start: 2024-06-18 | End: 2024-06-19 | Stop reason: HOSPADM

## 2024-06-18 RX ORDER — ACETAMINOPHEN 325 MG/1
650 TABLET ORAL
OUTPATIENT
Start: 2024-06-25

## 2024-06-18 RX ORDER — EPINEPHRINE 1 MG/ML
0.3 INJECTION, SOLUTION, CONCENTRATE INTRAVENOUS PRN
Status: CANCELLED | OUTPATIENT
Start: 2024-06-18

## 2024-06-18 RX ORDER — SODIUM CHLORIDE 9 MG/ML
5-250 INJECTION, SOLUTION INTRAVENOUS PRN
OUTPATIENT
Start: 2024-06-25

## 2024-06-18 RX ORDER — ONDANSETRON 2 MG/ML
8 INJECTION INTRAMUSCULAR; INTRAVENOUS ONCE
Status: CANCELLED | OUTPATIENT
Start: 2024-06-18 | End: 2024-06-18

## 2024-06-18 RX ORDER — ZOLEDRONIC ACID 0.04 MG/ML
4 INJECTION, SOLUTION INTRAVENOUS ONCE
OUTPATIENT
Start: 2024-08-27 | End: 2024-08-27

## 2024-06-18 RX ORDER — ONDANSETRON 2 MG/ML
8 INJECTION INTRAMUSCULAR; INTRAVENOUS
OUTPATIENT
Start: 2024-06-25

## 2024-06-18 RX ORDER — SODIUM CHLORIDE 9 MG/ML
INJECTION, SOLUTION INTRAVENOUS CONTINUOUS
OUTPATIENT
Start: 2024-06-25

## 2024-06-18 RX ORDER — DIPHENHYDRAMINE HYDROCHLORIDE 50 MG/ML
INJECTION INTRAMUSCULAR; INTRAVENOUS
Status: DISCONTINUED
Start: 2024-06-18 | End: 2024-06-18 | Stop reason: WASHOUT

## 2024-06-18 RX ORDER — SODIUM CHLORIDE 0.9 % (FLUSH) 0.9 %
5-40 SYRINGE (ML) INJECTION PRN
OUTPATIENT
Start: 2024-06-25

## 2024-06-18 RX ORDER — SODIUM CHLORIDE 9 MG/ML
INJECTION, SOLUTION INTRAVENOUS CONTINUOUS
OUTPATIENT
Start: 2024-08-27

## 2024-06-18 RX ORDER — ALBUTEROL SULFATE 90 UG/1
4 AEROSOL, METERED RESPIRATORY (INHALATION) PRN
Status: DISCONTINUED | OUTPATIENT
Start: 2024-06-18 | End: 2024-06-19 | Stop reason: HOSPADM

## 2024-06-18 RX ORDER — ALBUTEROL SULFATE 90 UG/1
4 AEROSOL, METERED RESPIRATORY (INHALATION) PRN
OUTPATIENT
Start: 2024-06-25

## 2024-06-18 RX ORDER — DIPHENHYDRAMINE HYDROCHLORIDE 50 MG/ML
50 INJECTION INTRAMUSCULAR; INTRAVENOUS
OUTPATIENT
Start: 2024-06-25

## 2024-06-18 RX ORDER — ACETAMINOPHEN 325 MG/1
650 TABLET ORAL
Status: DISCONTINUED | OUTPATIENT
Start: 2024-06-18 | End: 2024-06-19 | Stop reason: HOSPADM

## 2024-06-18 RX ORDER — SODIUM CHLORIDE 9 MG/ML
5-250 INJECTION, SOLUTION INTRAVENOUS PRN
OUTPATIENT
Start: 2024-08-27

## 2024-06-18 RX ORDER — DIPHENHYDRAMINE HYDROCHLORIDE 50 MG/ML
50 INJECTION INTRAMUSCULAR; INTRAVENOUS
Status: CANCELLED | OUTPATIENT
Start: 2024-06-18

## 2024-06-18 RX ORDER — FAMOTIDINE 10 MG/ML
20 INJECTION, SOLUTION INTRAVENOUS
Status: CANCELLED | OUTPATIENT
Start: 2024-06-18

## 2024-06-18 RX ORDER — EPINEPHRINE 1 MG/ML
0.3 INJECTION, SOLUTION, CONCENTRATE INTRAVENOUS PRN
OUTPATIENT
Start: 2024-06-25

## 2024-06-18 RX ORDER — ERIBULIN MESYLATE 0.5 MG/ML
1.4 INJECTION INTRAVENOUS ONCE
Status: COMPLETED | OUTPATIENT
Start: 2024-06-18 | End: 2024-06-18

## 2024-06-18 RX ORDER — PROCHLORPERAZINE EDISYLATE 5 MG/ML
10 INJECTION INTRAMUSCULAR; INTRAVENOUS
Status: DISCONTINUED | OUTPATIENT
Start: 2024-06-18 | End: 2024-06-19 | Stop reason: HOSPADM

## 2024-06-18 RX ORDER — SODIUM CHLORIDE 9 MG/ML
INJECTION, SOLUTION INTRAVENOUS CONTINUOUS
Status: CANCELLED | OUTPATIENT
Start: 2024-06-18

## 2024-06-18 RX ORDER — FAMOTIDINE 10 MG/ML
INJECTION, SOLUTION INTRAVENOUS
Status: DISCONTINUED
Start: 2024-06-18 | End: 2024-06-18 | Stop reason: WASHOUT

## 2024-06-18 RX ORDER — EPINEPHRINE 1 MG/ML
0.3 INJECTION, SOLUTION INTRAMUSCULAR; SUBCUTANEOUS PRN
Status: DISCONTINUED | OUTPATIENT
Start: 2024-06-18 | End: 2024-06-19 | Stop reason: HOSPADM

## 2024-06-18 RX ORDER — ONDANSETRON 2 MG/ML
8 INJECTION INTRAMUSCULAR; INTRAVENOUS ONCE
OUTPATIENT
Start: 2024-06-25 | End: 2024-06-25

## 2024-06-18 RX ORDER — ACETAMINOPHEN 325 MG/1
650 TABLET ORAL
OUTPATIENT
Start: 2024-08-27

## 2024-06-18 RX ORDER — SODIUM CHLORIDE 0.9 % (FLUSH) 0.9 %
5-40 SYRINGE (ML) INJECTION PRN
Status: CANCELLED | OUTPATIENT
Start: 2024-06-18

## 2024-06-18 RX ORDER — HEPARIN 100 UNIT/ML
500 SYRINGE INTRAVENOUS PRN
OUTPATIENT
Start: 2024-08-27

## 2024-06-18 RX ORDER — DIPHENHYDRAMINE HYDROCHLORIDE 50 MG/ML
50 INJECTION INTRAMUSCULAR; INTRAVENOUS
Status: DISCONTINUED | OUTPATIENT
Start: 2024-06-18 | End: 2024-06-19 | Stop reason: HOSPADM

## 2024-06-18 RX ORDER — HEPARIN 100 UNIT/ML
500 SYRINGE INTRAVENOUS PRN
Status: DISCONTINUED | OUTPATIENT
Start: 2024-06-18 | End: 2024-06-19 | Stop reason: HOSPADM

## 2024-06-18 RX ORDER — DIPHENHYDRAMINE HYDROCHLORIDE 50 MG/ML
50 INJECTION INTRAMUSCULAR; INTRAVENOUS
OUTPATIENT
Start: 2024-08-27

## 2024-06-18 RX ORDER — ALBUTEROL SULFATE 90 UG/1
4 AEROSOL, METERED RESPIRATORY (INHALATION) PRN
OUTPATIENT
Start: 2024-08-27

## 2024-06-18 RX ORDER — ACETAMINOPHEN 325 MG/1
650 TABLET ORAL
Status: CANCELLED | OUTPATIENT
Start: 2024-06-18

## 2024-06-18 RX ORDER — FAMOTIDINE 10 MG/ML
20 INJECTION, SOLUTION INTRAVENOUS
OUTPATIENT
Start: 2024-08-27

## 2024-06-18 RX ORDER — ONDANSETRON 2 MG/ML
8 INJECTION INTRAMUSCULAR; INTRAVENOUS ONCE
Status: COMPLETED | OUTPATIENT
Start: 2024-06-18 | End: 2024-06-18

## 2024-06-18 RX ORDER — HEPARIN SODIUM (PORCINE) LOCK FLUSH IV SOLN 100 UNIT/ML 100 UNIT/ML
500 SOLUTION INTRAVENOUS PRN
Status: CANCELLED | OUTPATIENT
Start: 2024-06-18

## 2024-06-18 RX ORDER — SODIUM CHLORIDE 0.9 % (FLUSH) 0.9 %
5-40 SYRINGE (ML) INJECTION PRN
OUTPATIENT
Start: 2024-08-27

## 2024-06-18 RX ORDER — EPINEPHRINE 1 MG/ML
0.3 INJECTION, SOLUTION INTRAMUSCULAR; SUBCUTANEOUS PRN
OUTPATIENT
Start: 2024-08-27

## 2024-06-18 RX ORDER — ONDANSETRON 2 MG/ML
8 INJECTION INTRAMUSCULAR; INTRAVENOUS
Status: CANCELLED | OUTPATIENT
Start: 2024-06-18

## 2024-06-18 RX ORDER — ALBUTEROL SULFATE 90 UG/1
4 AEROSOL, METERED RESPIRATORY (INHALATION) PRN
Status: CANCELLED | OUTPATIENT
Start: 2024-06-18

## 2024-06-18 RX ORDER — SODIUM CHLORIDE 9 MG/ML
5-250 INJECTION, SOLUTION INTRAVENOUS PRN
Status: CANCELLED | OUTPATIENT
Start: 2024-06-18

## 2024-06-18 RX ORDER — ONDANSETRON 2 MG/ML
8 INJECTION INTRAMUSCULAR; INTRAVENOUS
OUTPATIENT
Start: 2024-08-27

## 2024-06-18 RX ADMIN — ERIBULIN MESYLATE 2.3 MG: 0.5 INJECTION INTRAVENOUS at 13:49

## 2024-06-18 RX ADMIN — HYDROCORTISONE SODIUM SUCCINATE 100 MG: 100 INJECTION, POWDER, FOR SOLUTION INTRAMUSCULAR; INTRAVENOUS at 15:32

## 2024-06-18 RX ADMIN — ZOLEDRONIC ACID 4 MG: 0.04 INJECTION, SOLUTION INTRAVENOUS at 13:12

## 2024-06-18 RX ADMIN — MARGETUXIMAB-CMKB 912.5 MG: 25 INJECTION, SOLUTION, CONCENTRATE INTRAVENOUS at 14:25

## 2024-06-18 RX ADMIN — SODIUM CHLORIDE 25 ML/HR: 9 INJECTION, SOLUTION INTRAVENOUS at 13:08

## 2024-06-18 RX ADMIN — ONDANSETRON 8 MG: 2 INJECTION INTRAMUSCULAR; INTRAVENOUS at 13:08

## 2024-06-18 ASSESSMENT — PAIN SCALES - GENERAL: PAINLEVEL_OUTOF10: 0

## 2024-06-18 NOTE — PROGRESS NOTES
Debra MIGUEL Moser is a 64 y.o. female here for follow-up of breast cancer.    
ILD.    Discussed that her EF was stable for awhile on T-Dxd, will monitor for sx at this time as to not change life sustaining medication for a small number change    We will plan to see the patient in follow up at least once per cycle, or sooner if symptoms warrant. Labs with each cycle for intensive monitoring for toxicity    The duration of this treatment plan will be until progression, intolerable side effects, or patient choice.    Rx:  zofran, compazine     2. Bone Metastases: Discussed zometa 4 mg q 3 months IV:    We discussed side effects such as ONJ and the need to avoid invasive dental procedures while on these medications, renal damage, and hypocalcemia.  Last given 3/12/24. Due 6/18/24    3. Emotional well being:  She has excellent support and is coping well with her disease    4. DM2:  was on metformin, no longer taking    5. Right Shoulder pain:  on celebrex; she has started PT.    6.Cough/RLL pneumonia:                                                                                                                                                                                        She underwent bronchoscopy 11/16/23 with cultures negative at this point. Restarted 40 mg prednisone daily and planned to repeat high res CT which was not scheduled. Decreased to 30mg prednisone 12/12/23. Cough improved per patient. Decreased to 20mg daily 1/2/24. decreased to 10mg daily, 1/23/24. Decreased to 10 mg every other day 2/13/24. Discontinued 3/12/24.     She then continued with mild cough that was likely due to cancer progression, improved with tessalon 100mg TID prn. She reports she had improvement with levofloxacin but after completion she had worsening.     Cough is worse, appears due to worsening right pleural effusion.  Thoracentesis 6/14/24 with cytology pending.     7. HTN: amlodipine 5mg daily.    8. AST elevation:  mild, due to cancer and therapy    9. Hypokalemia:  will replete in opic prn    10. L

## 2024-06-18 NOTE — PROGRESS NOTES
[facility] Progress Note    Date: 2024    Name: Debra Moser    MRN: [id number]         : 1960    Ms. Lobito Moser Arrived ambulatory and in no distress for C1D1 of Regimen.  Assessment was completed, no acute issues at this time, no new complaints voiced.  Right chest wall port accessed without difficulty, labs drawn & sent for processing.         Patient proceed to appointment with Dr. Underwood.    Ms. Lobito Moser's vitals were reviewed.  Vitals:    24 1100   BP: (!) 116/58   Pulse: 88   Resp: 18   Temp: 96.9 °F (36.1 °C)   SpO2: 94%       Lab results were obtained and reviewed.  Recent Results (from the past 12 hour(s))   Magnesium    Collection Time: 24 11:05 AM   Result Value Ref Range    Magnesium 2.1 1.6 - 2.4 mg/dL   Comprehensive metabolic panel    Collection Time: 24 11:05 AM   Result Value Ref Range    Sodium 137 136 - 145 mmol/L    Potassium 3.3 (L) 3.5 - 5.1 mmol/L    Chloride 103 97 - 108 mmol/L    CO2 32 21 - 32 mmol/L    Anion Gap 2 (L) 5 - 15 mmol/L    Glucose 193 (H) 65 - 100 mg/dL    BUN 16 6 - 20 MG/DL    Creatinine 0.68 0.55 - 1.02 MG/DL    BUN/Creatinine Ratio 24 (H) 12 - 20      Est, Glom Filt Rate >90 >60 ml/min/1.73m2    Calcium 9.2 8.5 - 10.1 MG/DL    Total Bilirubin 0.3 0.2 - 1.0 MG/DL    ALT 10 (L) 12 - 78 U/L    AST 23 15 - 37 U/L    Alk Phosphatase 77 45 - 117 U/L    Total Protein 7.3 6.4 - 8.2 g/dL    Albumin 3.0 (L) 3.5 - 5.0 g/dL    Globulin 4.3 (H) 2.0 - 4.0 g/dL    Albumin/Globulin Ratio 0.7 (L) 1.1 - 2.2     CBC With Auto Differential    Collection Time: 24 11:05 AM   Result Value Ref Range    WBC 10.7 3.6 - 11.0 K/uL    RBC 4.11 3.80 - 5.20 M/uL    Hemoglobin 12.2 11.5 - 16.0 g/dL    Hematocrit 38.9 35.0 - 47.0 %    MCV 94.6 80.0 - 99.0 FL    MCH 29.7 26.0 - 34.0 PG    MCHC 31.4 30.0 - 36.5 g/dL    RDW 16.3 (H) 11.5 - 14.5 %    Platelets 300 150 - 400 K/uL    MPV 10.0 8.9 - 12.9 FL    Nucleated RBCs 0.0 0  WBC    nRBC 0.00 0.00 -

## 2024-06-19 LAB — HBV CORE AB SERPL QL IA: NEGATIVE

## 2024-06-25 ENCOUNTER — HOSPITAL ENCOUNTER (OUTPATIENT)
Facility: HOSPITAL | Age: 64
Setting detail: INFUSION SERIES
Discharge: HOME | End: 2024-06-25

## 2024-06-25 ENCOUNTER — OFFICE VISIT (OUTPATIENT)
Age: 64
End: 2024-06-25
Payer: SUBSIDIZED

## 2024-06-25 ENCOUNTER — HOSPITAL ENCOUNTER (OUTPATIENT)
Facility: HOSPITAL | Age: 64
Discharge: HOME | End: 2024-06-28

## 2024-06-25 VITALS
HEART RATE: 90 BPM | RESPIRATION RATE: 18 BRPM | WEIGHT: 131.6 LBS | TEMPERATURE: 98 F | DIASTOLIC BLOOD PRESSURE: 68 MMHG | SYSTOLIC BLOOD PRESSURE: 101 MMHG | HEIGHT: 62 IN | BODY MASS INDEX: 24.22 KG/M2 | OXYGEN SATURATION: 92 %

## 2024-06-25 VITALS
OXYGEN SATURATION: 94 % | HEART RATE: 93 BPM | RESPIRATION RATE: 18 BRPM | TEMPERATURE: 98 F | SYSTOLIC BLOOD PRESSURE: 113 MMHG | BODY MASS INDEX: 24.22 KG/M2 | HEIGHT: 62 IN | WEIGHT: 131.6 LBS | DIASTOLIC BLOOD PRESSURE: 56 MMHG

## 2024-06-25 DIAGNOSIS — C50.811 MALIGNANT NEOPLASM OF OVERLAPPING SITES OF RIGHT BREAST IN FEMALE, ESTROGEN RECEPTOR NEGATIVE (HCC): Primary | ICD-10-CM

## 2024-06-25 DIAGNOSIS — C79.51 CARCINOMA OF BREAST METASTATIC TO BONE, UNSPECIFIED LATERALITY (HCC): ICD-10-CM

## 2024-06-25 DIAGNOSIS — J91.0 MALIGNANT PLEURAL EFFUSION: ICD-10-CM

## 2024-06-25 DIAGNOSIS — C50.811 MALIGNANT NEOPLASM OF OVERLAPPING SITES OF RIGHT BREAST IN FEMALE, ESTROGEN RECEPTOR NEGATIVE (HCC): ICD-10-CM

## 2024-06-25 DIAGNOSIS — Z51.11 ENCOUNTER FOR ANTINEOPLASTIC CHEMOTHERAPY: ICD-10-CM

## 2024-06-25 DIAGNOSIS — Z17.1 MALIGNANT NEOPLASM OF BREAST IN FEMALE, ESTROGEN RECEPTOR NEGATIVE, UNSPECIFIED LATERALITY, UNSPECIFIED SITE OF BREAST (HCC): Primary | ICD-10-CM

## 2024-06-25 DIAGNOSIS — C50.919 CARCINOMA OF BREAST METASTATIC TO BONE, UNSPECIFIED LATERALITY (HCC): ICD-10-CM

## 2024-06-25 DIAGNOSIS — Z17.1 MALIGNANT NEOPLASM OF OVERLAPPING SITES OF RIGHT BREAST IN FEMALE, ESTROGEN RECEPTOR NEGATIVE (HCC): ICD-10-CM

## 2024-06-25 DIAGNOSIS — Z17.1 MALIGNANT NEOPLASM OF OVERLAPPING SITES OF RIGHT BREAST IN FEMALE, ESTROGEN RECEPTOR NEGATIVE (HCC): Primary | ICD-10-CM

## 2024-06-25 DIAGNOSIS — C50.919 MALIGNANT NEOPLASM OF BREAST IN FEMALE, ESTROGEN RECEPTOR NEGATIVE, UNSPECIFIED LATERALITY, UNSPECIFIED SITE OF BREAST (HCC): Primary | ICD-10-CM

## 2024-06-25 LAB
ANION GAP SERPL CALC-SCNC: 8 MMOL/L (ref 5–15)
BASOPHILS # BLD: 0 K/UL (ref 0–0.1)
BASOPHILS NFR BLD: 1 % (ref 0–1)
BUN SERPL-MCNC: 6 MG/DL (ref 6–20)
BUN/CREAT SERPL: 10 (ref 12–20)
CALCIUM SERPL-MCNC: 9.5 MG/DL (ref 8.5–10.1)
CHLORIDE SERPL-SCNC: 103 MMOL/L (ref 97–108)
CO2 SERPL-SCNC: 27 MMOL/L (ref 21–32)
CREAT SERPL-MCNC: 0.6 MG/DL (ref 0.55–1.02)
DIFFERENTIAL METHOD BLD: ABNORMAL
EOSINOPHIL # BLD: 0 K/UL (ref 0–0.4)
EOSINOPHIL NFR BLD: 0 % (ref 0–7)
ERYTHROCYTE [DISTWIDTH] IN BLOOD BY AUTOMATED COUNT: 15.9 % (ref 11.5–14.5)
GLUCOSE SERPL-MCNC: 182 MG/DL (ref 65–100)
HCT VFR BLD AUTO: 32.3 % (ref 35–47)
HGB BLD-MCNC: 10.3 G/DL (ref 11.5–16)
IMM GRANULOCYTES # BLD AUTO: 0 K/UL
IMM GRANULOCYTES NFR BLD AUTO: 0 %
LYMPHOCYTES # BLD: 0.9 K/UL (ref 0.8–3.5)
LYMPHOCYTES NFR BLD: 29 % (ref 12–49)
MAGNESIUM SERPL-MCNC: 2 MG/DL (ref 1.6–2.4)
MCH RBC QN AUTO: 29.9 PG (ref 26–34)
MCHC RBC AUTO-ENTMCNC: 31.9 G/DL (ref 30–36.5)
MCV RBC AUTO: 93.6 FL (ref 80–99)
MONOCYTES # BLD: 0.1 K/UL (ref 0–1)
MONOCYTES NFR BLD: 3 % (ref 5–13)
NEUTS SEG # BLD: 2.2 K/UL (ref 1.8–8)
NEUTS SEG NFR BLD: 67 % (ref 32–75)
NRBC # BLD: 0 K/UL (ref 0–0.01)
NRBC BLD-RTO: 0 PER 100 WBC
PLATELET # BLD AUTO: 289 K/UL (ref 150–400)
PMV BLD AUTO: 8.8 FL (ref 8.9–12.9)
POTASSIUM SERPL-SCNC: 3.1 MMOL/L (ref 3.5–5.1)
RBC # BLD AUTO: 3.45 M/UL (ref 3.8–5.2)
RBC MORPH BLD: ABNORMAL
SODIUM SERPL-SCNC: 138 MMOL/L (ref 136–145)
WBC # BLD AUTO: 3.2 K/UL (ref 3.6–11)
WBC MORPH BLD: ABNORMAL

## 2024-06-25 PROCEDURE — 96375 TX/PRO/DX INJ NEW DRUG ADDON: CPT

## 2024-06-25 PROCEDURE — 2580000003 HC RX 258: Performed by: INTERNAL MEDICINE

## 2024-06-25 PROCEDURE — 6360000002 HC RX W HCPCS: Performed by: INTERNAL MEDICINE

## 2024-06-25 PROCEDURE — 96409 CHEMO IV PUSH SNGL DRUG: CPT

## 2024-06-25 PROCEDURE — 6370000000 HC RX 637 (ALT 250 FOR IP): Performed by: NURSE PRACTITIONER

## 2024-06-25 PROCEDURE — 71046 X-RAY EXAM CHEST 2 VIEWS: CPT

## 2024-06-25 PROCEDURE — 85025 COMPLETE CBC W/AUTO DIFF WBC: CPT

## 2024-06-25 PROCEDURE — 99215 OFFICE O/P EST HI 40 MIN: CPT | Performed by: NURSE PRACTITIONER

## 2024-06-25 PROCEDURE — 83735 ASSAY OF MAGNESIUM: CPT

## 2024-06-25 PROCEDURE — 80048 BASIC METABOLIC PNL TOTAL CA: CPT

## 2024-06-25 RX ORDER — SODIUM CHLORIDE 9 MG/ML
5-250 INJECTION, SOLUTION INTRAVENOUS PRN
Status: DISCONTINUED | OUTPATIENT
Start: 2024-06-25 | End: 2024-06-26 | Stop reason: HOSPADM

## 2024-06-25 RX ORDER — ONDANSETRON 2 MG/ML
8 INJECTION INTRAMUSCULAR; INTRAVENOUS ONCE
Status: COMPLETED | OUTPATIENT
Start: 2024-06-25 | End: 2024-06-25

## 2024-06-25 RX ORDER — SODIUM CHLORIDE 0.9 % (FLUSH) 0.9 %
5-40 SYRINGE (ML) INJECTION PRN
Status: DISCONTINUED | OUTPATIENT
Start: 2024-06-25 | End: 2024-06-26 | Stop reason: HOSPADM

## 2024-06-25 RX ORDER — POTASSIUM CHLORIDE 750 MG/1
60 TABLET, FILM COATED, EXTENDED RELEASE ORAL ONCE
Status: COMPLETED | OUTPATIENT
Start: 2024-06-25 | End: 2024-06-25

## 2024-06-25 RX ORDER — ERIBULIN MESYLATE 0.5 MG/ML
1.4 INJECTION INTRAVENOUS ONCE
Status: COMPLETED | OUTPATIENT
Start: 2024-06-25 | End: 2024-06-25

## 2024-06-25 RX ADMIN — ONDANSETRON 8 MG: 2 INJECTION INTRAMUSCULAR; INTRAVENOUS at 12:56

## 2024-06-25 RX ADMIN — ERIBULIN MESYLATE 2.3 MG: 0.5 INJECTION INTRAVENOUS at 14:32

## 2024-06-25 RX ADMIN — POTASSIUM CHLORIDE 60 MEQ: 750 TABLET, FILM COATED, EXTENDED RELEASE ORAL at 12:54

## 2024-06-25 RX ADMIN — SODIUM CHLORIDE 25 ML/HR: 9 INJECTION, SOLUTION INTRAVENOUS at 12:55

## 2024-06-25 ASSESSMENT — PAIN SCALES - GENERAL: PAINLEVEL_OUTOF10: 0

## 2024-06-25 NOTE — PROGRESS NOTES
[facility] [place] [facility]  [address] [address], [address] [facility] [address]  W: [phone]  F: [phone]      Reason for Visit:   Debra Moser is a 64 y.o. female who has transferred to me from [place].    Treatment History:   2016:  Original right stage 3 breast cancer, s/p mastectomy with 5 cm IDC, 3/19 LN, ER negative, HI negative, HER 2 +, treated with chemotherapy (unknown) and XRT (in [facility])  9/2018:  recurrent breast cancer to her neck, tx with docetaxel q 3 weeks x 9, completed 4/12/2019 (PD) ([facility])  6/25/19 L ax LN FNA:  poorly differentiated carcinoma, c/w breast cancer, ER negative, HI negative, HER 2 positive at IHC 3+  Tempus testing:  HER 2 overexpression  7/26/19 Trastuzumab/pertuzumab/paclitaxel until 7/2021 after 32 cycles (PD) ([place], [place])  XRT to right neck 1/2021  T-DM1 8/10/21- T-Dxd  T-Dxd 7/2022- 9/26/23   Hold 9/26/23 due to gr 1 ILD - discontinued due to continued concern for chronic ILD on CT  Right shoulder dislocation and infection 11/17/22  Zometa 9/5/2023 - current  Bronch, BAL, malignant cells present, compatible with carcinoma, not able to do receptors  Trastuzumab 11/21/23 - 1/23/24 (PD)  Tucatinib 11/13/23 - 2/13/24 (PD)  Capecitabine 11/23/23 - 2/13/24 (PD)  T-Dxd 2/20/24- 6/4/24 (PD)  Margetuximab + eribulin 6/18/24-current  Right thoracentesis 6/14/24 1500 mL removed - + for metastatic carcinoma, ER/HI -, HER2 + by IHC    History of Present Illness:   Recently moved to [place] live with daughter.    Interval Hx: Patient presents today for follow up and treatment.  Reports gr 1 cough today.     Entire visit was done with Michelle from [facility].    Past Medical History:   Diagnosis Date    Cancer (HCC)     breast on right    Diabetes mellitus (HCC)     Hypertension       Past Surgical History:   Procedure Laterality Date    BRONCHOSCOPY N/A 11/16/2023    BRONCHOSCOPY performed by Flower Esteves MD at [facility] ENDOSCOPY

## 2024-06-25 NOTE — PROGRESS NOTES
[facility] Progress Note    Date: 2024    Name: Debra Moser    MRN: [id number]         : 1960    Ms. Lobito Moser Arrived ambulatory and in no distress for C1D8 of Eribulin Regimen.  Assessment was completed, no acute issues at this time, no new complaints voiced.  R chest chest wall port accessed without difficulty, labs drawn & sent for processing. Used  services with patient (Botswanan).    Patient proceed to appointment with Dr. Underwood.    Ms. Lobito Moser's vitals were reviewed.  Vitals:    24 1045   BP: (!) 113/56   Pulse: 93   Resp: 18   Temp: 98 °F (36.7 °C)   SpO2: 92%       Lab results were obtained and reviewed.  Recent Results (from the past 12 hour(s))   CBC with Auto Differential    Collection Time: 24 11:07 AM   Result Value Ref Range    WBC 3.2 (L) 3.6 - 11.0 K/uL    RBC 3.45 (L) 3.80 - 5.20 M/uL    Hemoglobin 10.3 (L) 11.5 - 16.0 g/dL    Hematocrit 32.3 (L) 35.0 - 47.0 %    MCV 93.6 80.0 - 99.0 FL    MCH 29.9 26.0 - 34.0 PG    MCHC 31.9 30.0 - 36.5 g/dL    RDW 15.9 (H) 11.5 - 14.5 %    Platelets 289 150 - 400 K/uL    MPV 8.8 (L) 8.9 - 12.9 FL    Nucleated RBCs 0.0 0  WBC    nRBC 0.00 0.00 - 0.01 K/uL    Neutrophils % 67 32 - 75 %    Lymphocytes % 29 12 - 49 %    Monocytes % 3 (L) 5 - 13 %    Eosinophils % 0 0 - 7 %    Basophils % 1 0 - 1 %    Immature Granulocytes % 0 %    Neutrophils Absolute 2.2 1.8 - 8.0 K/UL    Lymphocytes Absolute 0.9 0.8 - 3.5 K/UL    Monocytes Absolute 0.1 0.0 - 1.0 K/UL    Eosinophils Absolute 0.0 0.0 - 0.4 K/UL    Basophils Absolute 0.0 0.0 - 0.1 K/UL    Immature Granulocytes Absolute 0.0 K/UL    Differential Type MANUAL      RBC Comment ANISOCYTOSIS  1+        WBC Comment REACTIVE LYMPHS     Basic Metabolic Panel    Collection Time: 24 11:07 AM   Result Value Ref Range    Sodium 138 136 - 145 mmol/L    Potassium 3.1 (L) 3.5 - 5.1 mmol/L    Chloride 103 97 - 108 mmol/L    CO2 27 21 - 32 mmol/L    Anion Gap 8 5 - 15

## 2024-06-28 ENCOUNTER — TELEPHONE (OUTPATIENT)
Age: 64
End: 2024-06-28

## 2024-06-28 DIAGNOSIS — C50.811 MALIGNANT NEOPLASM OF OVERLAPPING SITES OF RIGHT BREAST IN FEMALE, ESTROGEN RECEPTOR NEGATIVE (HCC): Primary | ICD-10-CM

## 2024-06-28 DIAGNOSIS — Z17.1 MALIGNANT NEOPLASM OF OVERLAPPING SITES OF RIGHT BREAST IN FEMALE, ESTROGEN RECEPTOR NEGATIVE (HCC): Primary | ICD-10-CM

## 2024-06-28 DIAGNOSIS — J91.0 MALIGNANT PLEURAL EFFUSION: ICD-10-CM

## 2024-06-28 NOTE — TELEPHONE ENCOUNTER
[facility] [place] [facility]  [phone]    06/28/24 11:05 AM EDT - Called and spoke to patient's daughter with the assistance of an  and informed them that the patient would need another thoracentesis. It is scheduled for July 1st at 9:30 AM at [place]. We reviewed why it was needed as well as the scheduled. No further questions at this time.

## 2024-07-01 ENCOUNTER — HOSPITAL ENCOUNTER (OUTPATIENT)
Facility: HOSPITAL | Age: 64
Discharge: HOME | End: 2024-07-04

## 2024-07-01 VITALS
RESPIRATION RATE: 16 BRPM | SYSTOLIC BLOOD PRESSURE: 114 MMHG | OXYGEN SATURATION: 95 % | DIASTOLIC BLOOD PRESSURE: 64 MMHG | HEART RATE: 94 BPM

## 2024-07-01 DIAGNOSIS — J91.0 MALIGNANT PLEURAL EFFUSION: ICD-10-CM

## 2024-07-01 DIAGNOSIS — C50.811 MALIGNANT NEOPLASM OF OVERLAPPING SITES OF RIGHT BREAST IN FEMALE, ESTROGEN RECEPTOR NEGATIVE (HCC): Primary | ICD-10-CM

## 2024-07-01 DIAGNOSIS — Z17.1 MALIGNANT NEOPLASM OF OVERLAPPING SITES OF RIGHT BREAST IN FEMALE, ESTROGEN RECEPTOR NEGATIVE (HCC): ICD-10-CM

## 2024-07-01 DIAGNOSIS — Z17.1 MALIGNANT NEOPLASM OF OVERLAPPING SITES OF RIGHT BREAST IN FEMALE, ESTROGEN RECEPTOR NEGATIVE (HCC): Primary | ICD-10-CM

## 2024-07-01 DIAGNOSIS — C50.811 MALIGNANT NEOPLASM OF OVERLAPPING SITES OF RIGHT BREAST IN FEMALE, ESTROGEN RECEPTOR NEGATIVE (HCC): ICD-10-CM

## 2024-07-01 LAB
COMMENT:: NORMAL
SPECIMEN HOLD: NORMAL

## 2024-07-01 PROCEDURE — 83986 ASSAY PH BODY FLUID NOS: CPT

## 2024-07-01 PROCEDURE — 87116 MYCOBACTERIA CULTURE: CPT

## 2024-07-01 PROCEDURE — 87070 CULTURE OTHR SPECIMN AEROBIC: CPT

## 2024-07-01 PROCEDURE — 87206 SMEAR FLUORESCENT/ACID STAI: CPT

## 2024-07-01 PROCEDURE — 89050 BODY FLUID CELL COUNT: CPT

## 2024-07-01 PROCEDURE — 84157 ASSAY OF PROTEIN OTHER: CPT

## 2024-07-01 PROCEDURE — 2500000003 HC RX 250 WO HCPCS: Performed by: NURSE PRACTITIONER

## 2024-07-01 PROCEDURE — 83615 LACTATE (LD) (LDH) ENZYME: CPT

## 2024-07-01 PROCEDURE — 32555 ASPIRATE PLEURA W/ IMAGING: CPT

## 2024-07-01 PROCEDURE — 71045 X-RAY EXAM CHEST 1 VIEW: CPT

## 2024-07-01 PROCEDURE — 87205 SMEAR GRAM STAIN: CPT

## 2024-07-01 PROCEDURE — 87102 FUNGUS ISOLATION CULTURE: CPT

## 2024-07-01 PROCEDURE — 82945 GLUCOSE OTHER FLUID: CPT

## 2024-07-01 RX ORDER — SODIUM CHLORIDE 9 MG/ML
5-250 INJECTION, SOLUTION INTRAVENOUS PRN
Status: CANCELLED | OUTPATIENT
Start: 2024-07-09

## 2024-07-01 RX ORDER — EPINEPHRINE 1 MG/ML
0.3 INJECTION, SOLUTION INTRAMUSCULAR; SUBCUTANEOUS PRN
Status: CANCELLED | OUTPATIENT
Start: 2024-07-09

## 2024-07-01 RX ORDER — HEPARIN 100 UNIT/ML
500 SYRINGE INTRAVENOUS PRN
Status: CANCELLED | OUTPATIENT
Start: 2024-07-09

## 2024-07-01 RX ORDER — DIPHENHYDRAMINE HYDROCHLORIDE 50 MG/ML
50 INJECTION INTRAMUSCULAR; INTRAVENOUS
Status: CANCELLED | OUTPATIENT
Start: 2024-07-09

## 2024-07-01 RX ORDER — PROCHLORPERAZINE EDISYLATE 5 MG/ML
10 INJECTION INTRAMUSCULAR; INTRAVENOUS
Status: CANCELLED | OUTPATIENT
Start: 2024-07-09

## 2024-07-01 RX ORDER — SODIUM CHLORIDE 9 MG/ML
INJECTION, SOLUTION INTRAVENOUS CONTINUOUS
Status: CANCELLED | OUTPATIENT
Start: 2024-07-09

## 2024-07-01 RX ORDER — FAMOTIDINE 10 MG/ML
20 INJECTION, SOLUTION INTRAVENOUS
Status: CANCELLED | OUTPATIENT
Start: 2024-07-09

## 2024-07-01 RX ORDER — ACETAMINOPHEN 325 MG/1
650 TABLET ORAL
Status: CANCELLED | OUTPATIENT
Start: 2024-07-09

## 2024-07-01 RX ORDER — LIDOCAINE HYDROCHLORIDE 10 MG/ML
10 INJECTION, SOLUTION EPIDURAL; INFILTRATION; INTRACAUDAL; PERINEURAL ONCE
Status: COMPLETED | OUTPATIENT
Start: 2024-07-01 | End: 2024-07-01

## 2024-07-01 RX ORDER — ALBUTEROL SULFATE 90 UG/1
4 AEROSOL, METERED RESPIRATORY (INHALATION) PRN
Status: CANCELLED | OUTPATIENT
Start: 2024-07-09

## 2024-07-01 RX ORDER — ONDANSETRON 2 MG/ML
8 INJECTION INTRAMUSCULAR; INTRAVENOUS
Status: CANCELLED | OUTPATIENT
Start: 2024-07-09

## 2024-07-01 RX ADMIN — LIDOCAINE HYDROCHLORIDE 10 ML: 10 INJECTION, SOLUTION EPIDURAL; INFILTRATION; INTRACAUDAL; PERINEURAL at 10:00

## 2024-07-01 NOTE — DISCHARGE INSTRUCTIONS
[facility]  Radiology Department  [phone]  Radiologist: Sy HONG / Dr. Antony    Date: 07/01/2024    Thoracentesis Discharge Instructions    You may have an aching pain in the puncture site tonight as the numbing medicine wears off.  You may take Tylenol, as directed on the label, for pain or discomfort.  Avoid ibuprofen (Advil, Motrin) and aspirin products for the next 48 hours as these drugs may increase your chance of bleeding.     You have develop a mild cough as the lungs re expand with air, this should resolve with in the next 24 hours.      Resume your previous diet and continue your prescribed medicaitons.    Rest for the next 24 hours.    If you experience shortness of breath (other than your normal breathing pattern) difficulty breathing, or have severe chest pain, call 911 and go to the nearest Emergency Room.    Be sure to follow up with your referring physician as soon as possible    Other:

## 2024-07-01 NOTE — PROGRESS NOTES
Patient arrives in ultrasound today for an ultrasound guided thoracentesis.    Patient is Polish speaking, using  ID #[id number] Everardo and session ID: [id number]  ID verified, using 2 identifiers.  Assessment as follows:  Mrs. Nickolas Moser is alert and oriented x 4, she reports feeling fatigued with new chemotherapy treatment, short of breath on exertion, denies shortness of breath at rest. Pulse ox is 91-94% on room air.    Provider  Sy Breaux NP at bedside for consent at 10:05 AM, all questions answered    Time out completed at 10:05 AM    Procedure start time 10:05 AM    Right posterior lower chest wall prepped under sterile technique, percutaenously accessed with a 6 Kiswahili 12 cm pigtail, with sanguineous fluid return, color is dark maroon. Fluid is loculated, drainage catheter connected to Vacutainer drainage bottle. Fluid drainage completed and drain removed, gauze and band-aid to site, tolerated well.    Total drainage out was 250 ml    Procedure end time 10:19 AM    Portable chest xray ordered to be completed as stat.    10:25 AM VS stable. Discharge instructions reviewed with patient and daughter verbally using the interpretor for home care after thoracentesis to include signs and symptoms for which she should immediately go to the closest ER for including chest pain, increased shortness of breath or other unusual acute symptoms. Both daughter and patient voice good understanding.The discharge instructions printed in Polish for patient.    Patient tolerated overall procedure well    10:42 AM. Patient CXR completed. Patient dressed self. Chest expansion is symmetrical, denies chest pain or shortness of breath. Discharged ambulatory in stable condition with her daughter to waiting room.     Essence Morocho RN

## 2024-07-02 ENCOUNTER — HOSPITAL ENCOUNTER (INPATIENT)
Facility: HOSPITAL | Age: 64
LOS: 2 days | Discharge: HOME | DRG: 175 | End: 2024-07-04
Attending: STUDENT IN AN ORGANIZED HEALTH CARE EDUCATION/TRAINING PROGRAM | Admitting: INTERNAL MEDICINE

## 2024-07-02 ENCOUNTER — APPOINTMENT (OUTPATIENT)
Facility: HOSPITAL | Age: 64
DRG: 175 | End: 2024-07-02

## 2024-07-02 ENCOUNTER — APPOINTMENT (OUTPATIENT)
Dept: VASCULAR SURGERY | Facility: HOSPITAL | Age: 64
DRG: 175 | End: 2024-07-02
Attending: INTERNAL MEDICINE

## 2024-07-02 DIAGNOSIS — R91.8 BILATERAL PULMONARY INFILTRATES: ICD-10-CM

## 2024-07-02 DIAGNOSIS — I26.94 MULTIPLE SUBSEGMENTAL PULMONARY EMBOLI WITHOUT ACUTE COR PULMONALE (HCC): Primary | ICD-10-CM

## 2024-07-02 DIAGNOSIS — J90 PLEURAL EFFUSION ON RIGHT: ICD-10-CM

## 2024-07-02 PROBLEM — E88.09 HYPOALBUMINEMIA: Status: ACTIVE | Noted: 2024-07-02

## 2024-07-02 PROBLEM — D70.9 NEUTROPENIA (HCC): Status: ACTIVE | Noted: 2024-07-02

## 2024-07-02 PROBLEM — I10 HYPERTENSION: Status: ACTIVE | Noted: 2024-07-02

## 2024-07-02 PROBLEM — Z86.73 HX OF COMPLETED STROKE: Status: ACTIVE | Noted: 2024-07-02

## 2024-07-02 PROBLEM — C79.9 METASTASIS FROM BREAST CANCER (HCC): Status: ACTIVE | Noted: 2023-08-15

## 2024-07-02 PROBLEM — I26.99 PULMONARY EMBOLISM AND INFARCTION (HCC): Status: ACTIVE | Noted: 2024-07-02

## 2024-07-02 PROBLEM — D64.9 ANEMIA: Status: ACTIVE | Noted: 2024-07-02

## 2024-07-02 PROBLEM — E11.59 DM TYPE 2 CAUSING VASCULAR DISEASE (HCC): Status: ACTIVE | Noted: 2024-07-02

## 2024-07-02 LAB
ALBUMIN SERPL-MCNC: 2.7 G/DL (ref 3.5–5)
ALBUMIN/GLOB SERPL: 0.6 (ref 1.1–2.2)
ALP SERPL-CCNC: 85 U/L (ref 45–117)
ALT SERPL-CCNC: 10 U/L (ref 12–78)
ANION GAP SERPL CALC-SCNC: 8 MMOL/L (ref 5–15)
APPEARANCE UR: CLEAR
AST SERPL-CCNC: 40 U/L (ref 15–37)
BASOPHILS # BLD: 0.1 K/UL (ref 0–0.1)
BASOPHILS NFR BLD: 3 % (ref 0–1)
BILIRUB SERPL-MCNC: 0.5 MG/DL (ref 0.2–1)
BILIRUB UR QL: NEGATIVE
BUN SERPL-MCNC: 6 MG/DL (ref 6–20)
BUN/CREAT SERPL: 9 (ref 12–20)
CALCIUM SERPL-MCNC: 9.2 MG/DL (ref 8.5–10.1)
CHLORIDE SERPL-SCNC: 104 MMOL/L (ref 97–108)
CO2 SERPL-SCNC: 26 MMOL/L (ref 21–32)
COLOR UR: NORMAL
CREAT SERPL-MCNC: 0.69 MG/DL (ref 0.55–1.02)
D DIMER PPP FEU-MCNC: 2.56 MG/L FEU (ref 0–0.65)
DIFFERENTIAL METHOD BLD: ABNORMAL
EKG ATRIAL RATE: 97 BPM
EKG DIAGNOSIS: NORMAL
EKG P AXIS: 59 DEGREES
EKG P-R INTERVAL: 176 MS
EKG Q-T INTERVAL: 366 MS
EKG QRS DURATION: 86 MS
EKG QTC CALCULATION (BAZETT): 464 MS
EKG R AXIS: -50 DEGREES
EKG T AXIS: 60 DEGREES
EKG VENTRICULAR RATE: 97 BPM
EOSINOPHIL # BLD: 0 K/UL (ref 0–0.4)
EOSINOPHIL NFR BLD: 0 % (ref 0–7)
ERYTHROCYTE [DISTWIDTH] IN BLOOD BY AUTOMATED COUNT: 16.5 % (ref 11.5–14.5)
GLOBULIN SER CALC-MCNC: 4.5 G/DL (ref 2–4)
GLUCOSE BLD STRIP.AUTO-MCNC: 125 MG/DL (ref 65–117)
GLUCOSE SERPL-MCNC: 162 MG/DL (ref 65–100)
GLUCOSE UR STRIP.AUTO-MCNC: NEGATIVE MG/DL
HCT VFR BLD AUTO: 33.9 % (ref 35–47)
HGB BLD-MCNC: 10.7 G/DL (ref 11.5–16)
HGB UR QL STRIP: NEGATIVE
IMM GRANULOCYTES # BLD AUTO: 0 K/UL
IMM GRANULOCYTES NFR BLD AUTO: 0 %
KETONES UR QL STRIP.AUTO: NEGATIVE MG/DL
LACTATE SERPL-SCNC: 1.2 MMOL/L (ref 0.4–2)
LDH SERPL L TO P-CCNC: 553 U/L (ref 81–246)
LEUKOCYTE ESTERASE UR QL STRIP.AUTO: NEGATIVE
LYMPHOCYTES # BLD: 0.9 K/UL (ref 0.8–3.5)
LYMPHOCYTES NFR BLD: 48 % (ref 12–49)
MAGNESIUM SERPL-MCNC: 1.6 MG/DL (ref 1.6–2.4)
MCH RBC QN AUTO: 29.8 PG (ref 26–34)
MCHC RBC AUTO-ENTMCNC: 31.6 G/DL (ref 30–36.5)
MCV RBC AUTO: 94.4 FL (ref 80–99)
MONOCYTES # BLD: 0.2 K/UL (ref 0–1)
MONOCYTES NFR BLD: 11 % (ref 5–13)
NEUTS SEG # BLD: 0.8 K/UL (ref 1.8–8)
NEUTS SEG NFR BLD: 38 % (ref 32–75)
NITRITE UR QL STRIP.AUTO: NEGATIVE
NRBC # BLD: 0.02 K/UL (ref 0–0.01)
NRBC BLD-RTO: 1 PER 100 WBC
NT PRO BNP: 37 PG/ML
PH UR STRIP: 7.5 (ref 5–8)
PHOSPHATE SERPL-MCNC: 3.4 MG/DL (ref 2.6–4.7)
PLATELET # BLD AUTO: 356 K/UL (ref 150–400)
PMV BLD AUTO: 9.5 FL (ref 8.9–12.9)
POTASSIUM SERPL-SCNC: 3.6 MMOL/L (ref 3.5–5.1)
PROCALCITONIN SERPL-MCNC: 0.15 NG/ML
PROCALCITONIN SERPL-MCNC: 0.17 NG/ML
PROT SERPL-MCNC: 7.2 G/DL (ref 6.4–8.2)
PROT UR STRIP-MCNC: NEGATIVE MG/DL
RBC # BLD AUTO: 3.59 M/UL (ref 3.8–5.2)
RBC MORPH BLD: ABNORMAL
SERVICE CMNT-IMP: ABNORMAL
SODIUM SERPL-SCNC: 138 MMOL/L (ref 136–145)
SP GR UR REFRACTOMETRY: 1.02 (ref 1–1.03)
TROPONIN I SERPL HS-MCNC: <4 NG/L (ref 0–51)
UFH PPP CHRO-ACNC: <0.1 IU/ML
UROBILINOGEN UR QL STRIP.AUTO: 0.2 EU/DL (ref 0.2–1)
WBC # BLD AUTO: 2 K/UL (ref 3.6–11)
WBC MORPH BLD: ABNORMAL

## 2024-07-02 PROCEDURE — 93005 ELECTROCARDIOGRAM TRACING: CPT | Performed by: STUDENT IN AN ORGANIZED HEALTH CARE EDUCATION/TRAINING PROGRAM

## 2024-07-02 PROCEDURE — 93010 ELECTROCARDIOGRAM REPORT: CPT | Performed by: SPECIALIST

## 2024-07-02 PROCEDURE — 70491 CT SOFT TISSUE NECK W/DYE: CPT

## 2024-07-02 PROCEDURE — 84484 ASSAY OF TROPONIN QUANT: CPT

## 2024-07-02 PROCEDURE — 96367 TX/PROPH/DG ADDL SEQ IV INF: CPT

## 2024-07-02 PROCEDURE — 96375 TX/PRO/DX INJ NEW DRUG ADDON: CPT

## 2024-07-02 PROCEDURE — 94761 N-INVAS EAR/PLS OXIMETRY MLT: CPT

## 2024-07-02 PROCEDURE — 87086 URINE CULTURE/COLONY COUNT: CPT

## 2024-07-02 PROCEDURE — 2580000003 HC RX 258: Performed by: INTERNAL MEDICINE

## 2024-07-02 PROCEDURE — 99285 EMERGENCY DEPT VISIT HI MDM: CPT

## 2024-07-02 PROCEDURE — 84145 PROCALCITONIN (PCT): CPT

## 2024-07-02 PROCEDURE — 85520 HEPARIN ASSAY: CPT

## 2024-07-02 PROCEDURE — 6360000004 HC RX CONTRAST MEDICATION: Performed by: STUDENT IN AN ORGANIZED HEALTH CARE EDUCATION/TRAINING PROGRAM

## 2024-07-02 PROCEDURE — 71275 CT ANGIOGRAPHY CHEST: CPT

## 2024-07-02 PROCEDURE — 80053 COMPREHEN METABOLIC PANEL: CPT

## 2024-07-02 PROCEDURE — 96365 THER/PROPH/DIAG IV INF INIT: CPT

## 2024-07-02 PROCEDURE — 84100 ASSAY OF PHOSPHORUS: CPT

## 2024-07-02 PROCEDURE — 6360000002 HC RX W HCPCS: Performed by: STUDENT IN AN ORGANIZED HEALTH CARE EDUCATION/TRAINING PROGRAM

## 2024-07-02 PROCEDURE — 83550 IRON BINDING TEST: CPT

## 2024-07-02 PROCEDURE — 36415 COLL VENOUS BLD VENIPUNCTURE: CPT

## 2024-07-02 PROCEDURE — 85379 FIBRIN DEGRADATION QUANT: CPT

## 2024-07-02 PROCEDURE — 2700000000 HC OXYGEN THERAPY PER DAY

## 2024-07-02 PROCEDURE — 83540 ASSAY OF IRON: CPT

## 2024-07-02 PROCEDURE — 82962 GLUCOSE BLOOD TEST: CPT

## 2024-07-02 PROCEDURE — 87040 BLOOD CULTURE FOR BACTERIA: CPT

## 2024-07-02 PROCEDURE — 96366 THER/PROPH/DIAG IV INF ADDON: CPT

## 2024-07-02 PROCEDURE — 83605 ASSAY OF LACTIC ACID: CPT

## 2024-07-02 PROCEDURE — 83880 ASSAY OF NATRIURETIC PEPTIDE: CPT

## 2024-07-02 PROCEDURE — 71045 X-RAY EXAM CHEST 1 VIEW: CPT

## 2024-07-02 PROCEDURE — 81002 URINALYSIS NONAUTO W/O SCOPE: CPT

## 2024-07-02 PROCEDURE — 82607 VITAMIN B-12: CPT

## 2024-07-02 PROCEDURE — 6360000002 HC RX W HCPCS: Performed by: INTERNAL MEDICINE

## 2024-07-02 PROCEDURE — 83010 ASSAY OF HAPTOGLOBIN QUANT: CPT

## 2024-07-02 PROCEDURE — 82728 ASSAY OF FERRITIN: CPT

## 2024-07-02 PROCEDURE — 2060000000 HC ICU INTERMEDIATE R&B

## 2024-07-02 PROCEDURE — 82746 ASSAY OF FOLIC ACID SERUM: CPT

## 2024-07-02 PROCEDURE — 85025 COMPLETE CBC W/AUTO DIFF WBC: CPT

## 2024-07-02 PROCEDURE — 83735 ASSAY OF MAGNESIUM: CPT

## 2024-07-02 PROCEDURE — 83615 LACTATE (LD) (LDH) ENZYME: CPT

## 2024-07-02 PROCEDURE — 2580000003 HC RX 258: Performed by: STUDENT IN AN ORGANIZED HEALTH CARE EDUCATION/TRAINING PROGRAM

## 2024-07-02 PROCEDURE — 93970 EXTREMITY STUDY: CPT

## 2024-07-02 RX ORDER — ACETAMINOPHEN 650 MG/1
650 SUPPOSITORY RECTAL EVERY 6 HOURS PRN
Status: DISCONTINUED | OUTPATIENT
Start: 2024-07-02 | End: 2024-07-02

## 2024-07-02 RX ORDER — 0.9 % SODIUM CHLORIDE 0.9 %
500 INTRAVENOUS SOLUTION INTRAVENOUS ONCE
Status: COMPLETED | OUTPATIENT
Start: 2024-07-02 | End: 2024-07-02

## 2024-07-02 RX ORDER — DIPHENHYDRAMINE HYDROCHLORIDE AND LIDOCAINE HYDROCHLORIDE AND ALUMINUM HYDROXIDE AND MAGNESIUM HYDRO
5 KIT 4 TIMES DAILY PRN
Status: DISCONTINUED | OUTPATIENT
Start: 2024-07-02 | End: 2024-07-04 | Stop reason: HOSPADM

## 2024-07-02 RX ORDER — ENOXAPARIN SODIUM 100 MG/ML
1 INJECTION SUBCUTANEOUS 2 TIMES DAILY
Status: DISCONTINUED | OUTPATIENT
Start: 2024-07-02 | End: 2024-07-04

## 2024-07-02 RX ORDER — HEPARIN SODIUM 10000 [USP'U]/100ML
5-30 INJECTION, SOLUTION INTRAVENOUS CONTINUOUS
Status: DISCONTINUED | OUTPATIENT
Start: 2024-07-02 | End: 2024-07-02

## 2024-07-02 RX ORDER — ONDANSETRON 2 MG/ML
4 INJECTION INTRAMUSCULAR; INTRAVENOUS EVERY 6 HOURS PRN
Status: DISCONTINUED | OUTPATIENT
Start: 2024-07-02 | End: 2024-07-02

## 2024-07-02 RX ORDER — POLYETHYLENE GLYCOL 3350 17 G/17G
17 POWDER, FOR SOLUTION ORAL DAILY PRN
Status: DISCONTINUED | OUTPATIENT
Start: 2024-07-02 | End: 2024-07-04 | Stop reason: HOSPADM

## 2024-07-02 RX ORDER — ONDANSETRON 4 MG/1
4 TABLET, ORALLY DISINTEGRATING ORAL EVERY 8 HOURS PRN
Status: DISCONTINUED | OUTPATIENT
Start: 2024-07-02 | End: 2024-07-04 | Stop reason: HOSPADM

## 2024-07-02 RX ORDER — SODIUM CHLORIDE 9 MG/ML
INJECTION, SOLUTION INTRAVENOUS PRN
Status: DISCONTINUED | OUTPATIENT
Start: 2024-07-02 | End: 2024-07-04 | Stop reason: HOSPADM

## 2024-07-02 RX ORDER — HEPARIN SODIUM 1000 [USP'U]/ML
40 INJECTION, SOLUTION INTRAVENOUS; SUBCUTANEOUS PRN
Status: DISCONTINUED | OUTPATIENT
Start: 2024-07-02 | End: 2024-07-02

## 2024-07-02 RX ORDER — HEPARIN SODIUM 1000 [USP'U]/ML
80 INJECTION, SOLUTION INTRAVENOUS; SUBCUTANEOUS ONCE
Status: COMPLETED | OUTPATIENT
Start: 2024-07-02 | End: 2024-07-02

## 2024-07-02 RX ORDER — HEPARIN SODIUM 1000 [USP'U]/ML
80 INJECTION, SOLUTION INTRAVENOUS; SUBCUTANEOUS PRN
Status: DISCONTINUED | OUTPATIENT
Start: 2024-07-02 | End: 2024-07-02

## 2024-07-02 RX ORDER — ACETAMINOPHEN 325 MG/1
650 TABLET ORAL EVERY 6 HOURS PRN
Status: DISCONTINUED | OUTPATIENT
Start: 2024-07-02 | End: 2024-07-04 | Stop reason: HOSPADM

## 2024-07-02 RX ORDER — SODIUM CHLORIDE 0.9 % (FLUSH) 0.9 %
5-40 SYRINGE (ML) INJECTION PRN
Status: DISCONTINUED | OUTPATIENT
Start: 2024-07-02 | End: 2024-07-04 | Stop reason: HOSPADM

## 2024-07-02 RX ORDER — SODIUM CHLORIDE 0.9 % (FLUSH) 0.9 %
5-40 SYRINGE (ML) INJECTION EVERY 12 HOURS SCHEDULED
Status: DISCONTINUED | OUTPATIENT
Start: 2024-07-02 | End: 2024-07-04 | Stop reason: HOSPADM

## 2024-07-02 RX ORDER — MORPHINE SULFATE 4 MG/ML
4 INJECTION, SOLUTION INTRAMUSCULAR; INTRAVENOUS
Status: COMPLETED | OUTPATIENT
Start: 2024-07-02 | End: 2024-07-02

## 2024-07-02 RX ADMIN — SODIUM CHLORIDE 500 ML: 9 INJECTION, SOLUTION INTRAVENOUS at 11:17

## 2024-07-02 RX ADMIN — ENOXAPARIN SODIUM 60 MG: 100 INJECTION SUBCUTANEOUS at 21:54

## 2024-07-02 RX ADMIN — MORPHINE SULFATE 4 MG: 4 INJECTION INTRAVENOUS at 11:16

## 2024-07-02 RX ADMIN — IOPAMIDOL 100 ML: 755 INJECTION, SOLUTION INTRAVENOUS at 11:34

## 2024-07-02 RX ADMIN — WATER 2000 MG: 1 INJECTION INTRAMUSCULAR; INTRAVENOUS; SUBCUTANEOUS at 12:51

## 2024-07-02 RX ADMIN — HEPARIN SODIUM AND DEXTROSE 18 UNITS/KG/HR: 10000; 5 INJECTION INTRAVENOUS at 14:59

## 2024-07-02 RX ADMIN — SODIUM CHLORIDE 500 ML: 9 INJECTION, SOLUTION INTRAVENOUS at 17:41

## 2024-07-02 RX ADMIN — HEPARIN SODIUM 4800 UNITS: 1000 INJECTION INTRAVENOUS; SUBCUTANEOUS at 14:55

## 2024-07-02 RX ADMIN — SODIUM CHLORIDE 1500 MG: 9 INJECTION, SOLUTION INTRAVENOUS at 12:51

## 2024-07-02 RX ADMIN — SODIUM CHLORIDE, PRESERVATIVE FREE 10 ML: 5 INJECTION INTRAVENOUS at 21:58

## 2024-07-02 ASSESSMENT — PAIN DESCRIPTION - LOCATION: LOCATION: OTHER (COMMENT)

## 2024-07-02 ASSESSMENT — PAIN SCALES - GENERAL
PAINLEVEL_OUTOF10: 10
PAINLEVEL_OUTOF10: 10

## 2024-07-02 ASSESSMENT — PAIN - FUNCTIONAL ASSESSMENT: PAIN_FUNCTIONAL_ASSESSMENT: 0-10

## 2024-07-02 NOTE — ED TRIAGE NOTES
Pt arrives to the ER for complaints of difficulty swallowing due to pain and shortness of breath that started about five days ago.     Pt states that she has been getting radiation to right breast along with chemotherapy.     Pt reports that she was seen here yesterday and had fluid removed from lungs.     Reports taking tylenol yesterday.

## 2024-07-02 NOTE — CONSULTS
Thoracic Surgery Phone Consult    Requested by Dr Mota, Emergency Medicine    Debra Moser is a 64 y.o. female who presents with shortness of breath. 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 Her loculated pleural effusion is small, and compared to May to July the chest imaging is fairly stable in regards to pleural disease, consolidated lung. She has multiple pulmonary embolizations which also explain SOB and dyspnea. From the pleural disease standpoint, this is malignant and chronic. Surgical intervention is unlikely to improve her outcomes. Repeat thoracentesis unless significantly increases in size are unlikely to help her symptoms without systemic treatment of her cancer and improvement in her disease burden. Given all this, there is no thoracic surgery intervention I would recommend. Based on this I do not believe she requires transfer to [facility] for higher level of care OR thoracic intervention, unless there is another medical reason for transfer. Discussed the imaging, my recommendations via the call center and with Dr. Mota in the [facility] emergency department.    Patient not seen, imaging, labs and chart reviewed. Remote consult for in-network patient.     Lou Benitez MD  Thoracic Surgeon  [facility] Thoracic Surgery at [address], [address]  Phone: [phone]  Fax: [phone]

## 2024-07-02 NOTE — ED NOTES
TRANSFER - OUT REPORT:    Verbal report given to CORY Velásquez on Debra Moser  being transferred to [facility] for routine progression of patient care       Report consisted of patient's Situation, Background, Assessment and   Recommendations(SBAR).     Information from the following report(s) Nurse Handoff Report, Index, ED SBAR, Intake/Output, MAR, Recent Results, and Cardiac Rhythm NSR  was reviewed with the receiving nurse.          Lines:   Implantable Port Left Subclavian (Active)       Peripheral IV 07/02/24 Left Antecubital (Active)   Site Assessment Clean, dry & intact 07/02/24 0943   Line Status Blood return noted 07/02/24 0943   Phlebitis Assessment No symptoms 07/02/24 0943   Infiltration Assessment 0 07/02/24 0943       Peripheral IV 07/02/24 Distal;Left;Anterior Forearm (Active)        Opportunity for questions and clarification was provided.

## 2024-07-02 NOTE — ED PROVIDER NOTES
Globulin 4.5 (*)     Albumin/Globulin Ratio 0.6 (*)     All other components within normal limits   TROPONIN   BRAIN NATRIURETIC PEPTIDE   HEPARIN, ANTI-XA   EXTRA TUBES HOLD   APTT   PROTIME-INR   HEPARIN, ANTI-XA   HEPARIN, ANTI-XA       All other labs were within normal range or not returned as of this dictation.    EMERGENCY DEPARTMENT COURSE and DIFFERENTIAL DIAGNOSIS/MDM:   Vitals:    Vitals:    07/02/24 1116 07/02/24 1400 07/02/24 1445 07/02/24 1500   BP: 116/63 (!) 99/58 (!) 111/58 (!) 108/57   Pulse: 91 88 90 92   Resp: 26 (!) 31 (!) 44 23   Temp:       TempSrc:       SpO2: 95% 93% 93% 92%   Weight:       Height:               Medical Decision Making  Amount and/or Complexity of Data Reviewed  Labs: ordered.  Radiology: ordered.  ECG/medicine tests: ordered.    Risk  Prescription drug management.  Decision regarding hospitalization.        This patient presents with dyspnea, most likely secondary to pulmonary embolus. Differential diagnosis includes pulmonary embolus, ACS, COPD exacerbation, restrictive lung disease, pneumonia, anemia, malignancy, and pleural effusion. Lower suspicion for acute cardiac etiologies to include ACS (HEART score ), CHF, pericardial effusion / tamponade. Presentation not consistent with asthma, COPD exacerbation, allergic etiologies. Dysphagia with broad differential including viral infection, mass lesion causing compression. Exam overall reassuring aside from lateral neck raised erythema and mildly swollen tonsils without exudate.     Plan: supplemental O2, CTA chest and CT neck, labs, troponin and BNP to assess for right heart strain or ACS, close hemodynamic monitoring, serial reassessment        REASSESSMENT   MEDICATIONS GIVEN:   Medications   heparin (porcine) injection 4,800 Units (has no administration in time range)   heparin (porcine) injection 2,400 Units (has no administration in time range)   heparin 25,000 units in dextrose 5% 250 mL (premix) infusion (18  Units/kg/hr × 59.4 kg IntraVENous New Bag 7/2/24 1459)   sodium chloride 0.9 % bolus 500 mL (0 mLs IntraVENous Stopped 7/2/24 1420)   morphine sulfate (PF) injection 4 mg (4 mg IntraVENous Given 7/2/24 1116)   iopamidol (ISOVUE-370) 76 % injection 100 mL (100 mLs IntraVENous Given 7/2/24 1134)   vancomycin (VANCOCIN) 1,500 mg in sodium chloride 0.9 % 250 mL IVPB ([id number]) (0 mg IntraVENous Stopped 7/2/24 1459)   ceFEPIme (MAXIPIME) 2,000 mg in sterile water 20 mL IV syringe (2,000 mg IntraVENous Given 7/2/24 1251)   heparin (porcine) injection 4,800 Units (4,800 Units IntraVENous Given 7/2/24 1455)       ED Course as of 07/02/24 1801   Tue Jul 02, 2024   0950 EKG documented and interpreted by Mireya Moat MD as: Normal sinus rhythm, HR approximately 97, regular rate, regular intervals, normal axis, no ST segment elevation   [LT]   1328 Dr. Bowles took report on CTA chest while I was in a procedure, will discuss results with patient, start anticoagulation, and plan on admission.  [LT]   1645 Discussed with hospitalist, would recommend transfer to [facility] for thoracic surgery availability. Discussed with patient and they are agreeable with a plan for transfer.  reaching out to transfer center. [LT]   1747 Discussed with thoracic surgeon on call at [facility] who reported no surgical intervention would be considered as on her review of imaging findings, chronic malignant effusion similar to prior. Would not recommend further drainage of effusion unless size of effusion significantly increased. Note will be added in chart to document phone consultation and review of imaging.  [LT]      ED Course User Index  [LT] Mireya Mota MD           CONSULTS:  None    PROCEDURES:  Unless otherwise noted below, none     Procedures      FINAL IMPRESSION      1. Multiple subsegmental pulmonary emboli without acute cor pulmonale (HCC)    2. Bilateral pulmonary infiltrates    3. Pleural effusion on

## 2024-07-02 NOTE — H&P
[facility]    Hospitalist Admission Note                                                                                                                                  NAME:  Debra Moser   :   1960   MRN:  [id number]     PCP:  None, None     Date/Time of service:  2024 7:00 PM  To assist coordination of care and communication with nursing and staff, this note may be preliminary early in the day, but finalized by end of the day.        Subjective:     CHIEF COMPLAINT: dyspnea, cough, pain     HISTORY OF PRESENT ILLNESS:     Ms. Lobito Moser is a 64 y.o. female who presented to the Emergency Department complaining of dyspnea, cough and chest pain.  These issues are somewhat chronic, now worse after thoracentesis recently.  ER finds multiple small PE on CTA scan.  We will admit her for management.    Past Medical History:   Diagnosis Date    DM type 2 causing vascular disease (HCC)     Hx of completed stroke     L posterior parietal    HX: breast cancer     Hypertension     Metastasis from breast cancer (HCC)     liver, lung, bone, lymph nodes        Past Surgical History:   Procedure Laterality Date    BRONCHOSCOPY N/A 2023    BRONCHOSCOPY performed by Flower Esteves MD at [facility] ENDOSCOPY    BRONCHOSCOPY  2023    BRONCHOSCOPY ALVEOLAR LAVAGE performed by Flower Esteves MD at [facility] ENDOSCOPY    CT NEEDLE BIOPSY SPINE  2019    CT NEEDLE BIOPSY SPINE 2019    HYSTERECTOMY (CERVIX STATUS UNKNOWN)  2012    MASTECTOMY Right     MEDICATION INJECTION N/A 2023    INJECTION MEDICATION performed by Flower Esteves MD at [facility] ENDOSCOPY       Social History     Tobacco Use    Smoking status: Never    Smokeless tobacco: Never   Substance Use Topics    Alcohol use: Not Currently        No family history on file.     No Known Allergies     Prior to Admission medications    Medication Sig Start Date End Date Taking? Authorizing Provider   [email or web address]

## 2024-07-03 ENCOUNTER — APPOINTMENT (OUTPATIENT)
Facility: HOSPITAL | Age: 64
DRG: 175 | End: 2024-07-03
Attending: INTERNAL MEDICINE

## 2024-07-03 LAB
ALBUMIN SERPL-MCNC: 2.3 G/DL (ref 3.5–5)
ALBUMIN/GLOB SERPL: 0.7 (ref 1.1–2.2)
ALP SERPL-CCNC: 77 U/L (ref 45–117)
ALT SERPL-CCNC: 8 U/L (ref 12–78)
ANION GAP SERPL CALC-SCNC: 7 MMOL/L (ref 5–15)
APPEARANCE FLD: ABNORMAL
AST SERPL-CCNC: 30 U/L (ref 15–37)
BACTERIA SPEC CULT: NORMAL
BILIRUB SERPL-MCNC: 0.5 MG/DL (ref 0.2–1)
BODY FLD TYPE: NORMAL
BUN SERPL-MCNC: 6 MG/DL (ref 6–20)
BUN/CREAT SERPL: 12 (ref 12–20)
CALCIUM SERPL-MCNC: 8.2 MG/DL (ref 8.5–10.1)
CHLORIDE SERPL-SCNC: 106 MMOL/L (ref 97–108)
CO2 SERPL-SCNC: 26 MMOL/L (ref 21–32)
COLOR FLD: ABNORMAL
COMMENT:: NORMAL
CREAT SERPL-MCNC: 0.51 MG/DL (ref 0.55–1.02)
ECHO AO ARCH DIAM: 1.9 CM
ECHO AO ASC DIAM: 2.7 CM
ECHO AO ASCENDING AORTA INDEX: 1.69 CM/M2
ECHO AO ROOT DIAM: 2.8 CM
ECHO AO ROOT INDEX: 1.75 CM/M2
ECHO AV AREA PEAK VELOCITY: 2.2 CM2
ECHO AV AREA/BSA PEAK VELOCITY: 1.4 CM2/M2
ECHO AV PEAK GRADIENT: 10 MMHG
ECHO AV PEAK VELOCITY: 1.6 M/S
ECHO AV VELOCITY RATIO: 0.81
ECHO BSA: 1.61 M2
ECHO BSA: 1.61 M2
ECHO LA DIAMETER INDEX: 1.5 CM/M2
ECHO LA DIAMETER: 2.4 CM
ECHO LA TO AORTIC ROOT RATIO: 0.86
ECHO LA VOL A-L A2C: 21 ML (ref 22–52)
ECHO LA VOL A-L A4C: 20 ML (ref 22–52)
ECHO LA VOL BP: 20 ML (ref 22–52)
ECHO LA VOL MOD A2C: 20 ML (ref 22–52)
ECHO LA VOL MOD A4C: 18 ML (ref 22–52)
ECHO LA VOL/BSA BIPLANE: 13 ML/M2 (ref 16–34)
ECHO LA VOLUME AREA LENGTH: 22 ML
ECHO LA VOLUME INDEX A-L A2C: 13 ML/M2 (ref 16–34)
ECHO LA VOLUME INDEX A-L A4C: 13 ML/M2 (ref 16–34)
ECHO LA VOLUME INDEX AREA LENGTH: 14 ML/M2 (ref 16–34)
ECHO LA VOLUME INDEX MOD A2C: 13 ML/M2 (ref 16–34)
ECHO LA VOLUME INDEX MOD A4C: 11 ML/M2 (ref 16–34)
ECHO LV E' LATERAL VELOCITY: 11 CM/S
ECHO LV E' SEPTAL VELOCITY: 9 CM/S
ECHO LV EDV A2C: 85 ML
ECHO LV EDV A4C: 58 ML
ECHO LV EDV BP: 72 ML (ref 56–104)
ECHO LV EDV INDEX A4C: 36 ML/M2
ECHO LV EDV INDEX BP: 45 ML/M2
ECHO LV EDV NDEX A2C: 53 ML/M2
ECHO LV EJECTION FRACTION A2C: 79 %
ECHO LV EJECTION FRACTION A4C: 68 %
ECHO LV ESV A2C: 18 ML
ECHO LV ESV A4C: 19 ML
ECHO LV ESV BP: 18 ML (ref 19–49)
ECHO LV ESV INDEX A2C: 11 ML/M2
ECHO LV ESV INDEX A4C: 12 ML/M2
ECHO LV ESV INDEX BP: 11 ML/M2
ECHO LV FRACTIONAL SHORTENING: 36 % (ref 28–44)
ECHO LV INTERNAL DIMENSION DIASTOLE INDEX: 2.75 CM/M2
ECHO LV INTERNAL DIMENSION DIASTOLIC: 4.4 CM (ref 3.9–5.3)
ECHO LV INTERNAL DIMENSION SYSTOLIC INDEX: 1.75 CM/M2
ECHO LV INTERNAL DIMENSION SYSTOLIC: 2.8 CM
ECHO LV IVSD: 0.6 CM (ref 0.6–0.9)
ECHO LV MASS 2D: 68.2 G (ref 67–162)
ECHO LV MASS INDEX 2D: 42.6 G/M2 (ref 43–95)
ECHO LV POSTERIOR WALL DIASTOLIC: 0.5 CM (ref 0.6–0.9)
ECHO LV RELATIVE WALL THICKNESS RATIO: 0.23
ECHO LVOT AREA: 2.8 CM2
ECHO LVOT DIAM: 1.9 CM
ECHO LVOT MEAN GRADIENT: 4 MMHG
ECHO LVOT PEAK GRADIENT: 7 MMHG
ECHO LVOT PEAK VELOCITY: 1.3 M/S
ECHO LVOT STROKE VOLUME INDEX: 40.2 ML/M2
ECHO LVOT SV: 64.3 ML
ECHO LVOT VTI: 22.7 CM
ECHO MV A VELOCITY: 0.72 M/S
ECHO MV E DECELERATION TIME (DT): 180.9 MS
ECHO MV E VELOCITY: 0.53 M/S
ECHO MV E/A RATIO: 0.74
ECHO MV E/E' LATERAL: 4.82
ECHO MV E/E' RATIO (AVERAGED): 5.35
ECHO MV E/E' SEPTAL: 5.89
ECHO RV INTERNAL DIMENSION: 3.9 CM
ECHO RV TAPSE: 2 CM (ref 1.7–?)
ECHO TV REGURGITANT MAX VELOCITY: 2.56 M/S
ECHO TV REGURGITANT PEAK GRADIENT: 26 MMHG
ERYTHROCYTE [DISTWIDTH] IN BLOOD BY AUTOMATED COUNT: 16.8 % (ref 11.5–14.5)
FERRITIN SERPL-MCNC: 422 NG/ML (ref 26–388)
FOLATE SERPL-MCNC: 26.1 NG/ML (ref 5–21)
GLOBULIN SER CALC-MCNC: 3.5 G/DL (ref 2–4)
GLUCOSE BLD STRIP.AUTO-MCNC: 151 MG/DL (ref 65–117)
GLUCOSE BLD STRIP.AUTO-MCNC: 159 MG/DL (ref 65–117)
GLUCOSE BLD STRIP.AUTO-MCNC: 160 MG/DL (ref 65–117)
GLUCOSE FLD-MCNC: 139 MG/DL
GLUCOSE SERPL-MCNC: 142 MG/DL (ref 65–100)
HAPTOGLOB SERPL-MCNC: 353 MG/DL (ref 30–200)
HCT VFR BLD AUTO: 28.6 % (ref 35–47)
HGB BLD-MCNC: 9 G/DL (ref 11.5–16)
IRON SATN MFR SERPL: 12 % (ref 20–50)
IRON SERPL-MCNC: 26 UG/DL (ref 35–150)
LDH FLD L TO P-CCNC: 589 U/L
LYMPHOCYTES NFR FLD: 69 %
MAGNESIUM SERPL-MCNC: 1.8 MG/DL (ref 1.6–2.4)
MCH RBC QN AUTO: 29.4 PG (ref 26–34)
MCHC RBC AUTO-ENTMCNC: 31.5 G/DL (ref 30–36.5)
MCV RBC AUTO: 93.5 FL (ref 80–99)
MONOS+MACROS NFR FLD: 8 %
NEUTROPHILS NFR FLD: 23 %
NRBC # BLD: 0.02 K/UL (ref 0–0.01)
NRBC BLD-RTO: 1.1 PER 100 WBC
NUC CELL # FLD: 79 /CU MM
PH FLD: 7
PHOSPHATE SERPL-MCNC: 3.2 MG/DL (ref 2.6–4.7)
PLATELET # BLD AUTO: 341 K/UL (ref 150–400)
PMV BLD AUTO: 9 FL (ref 8.9–12.9)
POTASSIUM SERPL-SCNC: 3.4 MMOL/L (ref 3.5–5.1)
PROT FLD-MCNC: 4.8 G/DL
PROT SERPL-MCNC: 5.8 G/DL (ref 6.4–8.2)
RBC # BLD AUTO: 3.06 M/UL (ref 3.8–5.2)
RBC # FLD: >100 /CU MM
SERVICE CMNT-IMP: ABNORMAL
SERVICE CMNT-IMP: NORMAL
SODIUM SERPL-SCNC: 139 MMOL/L (ref 136–145)
SPECIMEN HOLD: NORMAL
SPECIMEN SOURCE FLD: ABNORMAL
SPECIMEN SOURCE FLD: NORMAL
TIBC SERPL-MCNC: 215 UG/DL (ref 250–450)
VIT B12 SERPL-MCNC: 890 PG/ML (ref 193–986)
WBC # BLD AUTO: 1.9 K/UL (ref 3.6–11)

## 2024-07-03 PROCEDURE — 83735 ASSAY OF MAGNESIUM: CPT

## 2024-07-03 PROCEDURE — 93308 TTE F-UP OR LMTD: CPT

## 2024-07-03 PROCEDURE — 2580000003 HC RX 258: Performed by: INTERNAL MEDICINE

## 2024-07-03 PROCEDURE — 88305 TISSUE EXAM BY PATHOLOGIST: CPT

## 2024-07-03 PROCEDURE — 6360000002 HC RX W HCPCS: Performed by: INTERNAL MEDICINE

## 2024-07-03 PROCEDURE — 99223 1ST HOSP IP/OBS HIGH 75: CPT | Performed by: INTERNAL MEDICINE

## 2024-07-03 PROCEDURE — 84100 ASSAY OF PHOSPHORUS: CPT

## 2024-07-03 PROCEDURE — 80053 COMPREHEN METABOLIC PANEL: CPT

## 2024-07-03 PROCEDURE — 88112 CYTOPATH CELL ENHANCE TECH: CPT

## 2024-07-03 PROCEDURE — 2060000000 HC ICU INTERMEDIATE R&B

## 2024-07-03 PROCEDURE — 6370000000 HC RX 637 (ALT 250 FOR IP): Performed by: INTERNAL MEDICINE

## 2024-07-03 PROCEDURE — 93308 TTE F-UP OR LMTD: CPT | Performed by: STUDENT IN AN ORGANIZED HEALTH CARE EDUCATION/TRAINING PROGRAM

## 2024-07-03 PROCEDURE — 94761 N-INVAS EAR/PLS OXIMETRY MLT: CPT

## 2024-07-03 PROCEDURE — 93325 DOPPLER ECHO COLOR FLOW MAPG: CPT | Performed by: STUDENT IN AN ORGANIZED HEALTH CARE EDUCATION/TRAINING PROGRAM

## 2024-07-03 PROCEDURE — 85027 COMPLETE CBC AUTOMATED: CPT

## 2024-07-03 PROCEDURE — 82962 GLUCOSE BLOOD TEST: CPT

## 2024-07-03 PROCEDURE — 93321 DOPPLER ECHO F-UP/LMTD STD: CPT | Performed by: STUDENT IN AN ORGANIZED HEALTH CARE EDUCATION/TRAINING PROGRAM

## 2024-07-03 RX ORDER — DEXTROSE MONOHYDRATE 100 MG/ML
INJECTION, SOLUTION INTRAVENOUS CONTINUOUS PRN
Status: DISCONTINUED | OUTPATIENT
Start: 2024-07-03 | End: 2024-07-04 | Stop reason: HOSPADM

## 2024-07-03 RX ORDER — INSULIN LISPRO 100 [IU]/ML
0-4 INJECTION, SOLUTION INTRAVENOUS; SUBCUTANEOUS NIGHTLY
Status: DISCONTINUED | OUTPATIENT
Start: 2024-07-03 | End: 2024-07-04 | Stop reason: HOSPADM

## 2024-07-03 RX ORDER — POTASSIUM CHLORIDE 750 MG/1
40 TABLET, FILM COATED, EXTENDED RELEASE ORAL ONCE
Status: COMPLETED | OUTPATIENT
Start: 2024-07-03 | End: 2024-07-03

## 2024-07-03 RX ORDER — METRONIDAZOLE 500 MG/100ML
500 INJECTION, SOLUTION INTRAVENOUS EVERY 8 HOURS
Status: DISCONTINUED | OUTPATIENT
Start: 2024-07-03 | End: 2024-07-04 | Stop reason: HOSPADM

## 2024-07-03 RX ORDER — INSULIN LISPRO 100 [IU]/ML
0-4 INJECTION, SOLUTION INTRAVENOUS; SUBCUTANEOUS
Status: DISCONTINUED | OUTPATIENT
Start: 2024-07-03 | End: 2024-07-04 | Stop reason: HOSPADM

## 2024-07-03 RX ADMIN — ENOXAPARIN SODIUM 60 MG: 100 INJECTION SUBCUTANEOUS at 21:30

## 2024-07-03 RX ADMIN — SODIUM CHLORIDE 100 ML: 9 INJECTION, SOLUTION INTRAVENOUS at 22:51

## 2024-07-03 RX ADMIN — SODIUM CHLORIDE, PRESERVATIVE FREE 10 ML: 5 INJECTION INTRAVENOUS at 08:56

## 2024-07-03 RX ADMIN — WATER 2000 MG: 1 INJECTION INTRAMUSCULAR; INTRAVENOUS; SUBCUTANEOUS at 14:15

## 2024-07-03 RX ADMIN — ENOXAPARIN SODIUM 60 MG: 100 INJECTION SUBCUTANEOUS at 08:55

## 2024-07-03 RX ADMIN — METRONIDAZOLE 500 MG: 500 INJECTION, SOLUTION INTRAVENOUS at 22:52

## 2024-07-03 RX ADMIN — SODIUM CHLORIDE, PRESERVATIVE FREE 10 ML: 5 INJECTION INTRAVENOUS at 22:46

## 2024-07-03 RX ADMIN — POTASSIUM CHLORIDE 40 MEQ: 750 TABLET, FILM COATED, EXTENDED RELEASE ORAL at 08:55

## 2024-07-03 RX ADMIN — VANCOMYCIN HYDROCHLORIDE 1000 MG: 1 INJECTION, POWDER, LYOPHILIZED, FOR SOLUTION INTRAVENOUS at 14:15

## 2024-07-03 RX ADMIN — METRONIDAZOLE 500 MG: 500 INJECTION, SOLUTION INTRAVENOUS at 14:15

## 2024-07-03 ASSESSMENT — PAIN SCALES - GENERAL
PAINLEVEL_OUTOF10: 0
PAINLEVEL_OUTOF10: 0

## 2024-07-03 NOTE — PROGRESS NOTES
Pharmacy Note - Extended Infusion Beta-Lactam Adjustment    Cefepime 2000mg Q12h for treatment of Community acquired pneumonia. Per [facility] Extended Infusion Beta-Lactam Policy, cefepime will be changed to 2000mg load followed by 2000mg Q8h extended infusion    Estimated Creatinine Clearance: Estimated Creatinine Clearance: 88 mL/min (A) (based on SCr of 0.51 mg/dL (L)).    BMI: Body mass index is 23.96 kg/m².    Please call with any questions.    Thank you,    LAMAR DESAI, [facility]

## 2024-07-03 NOTE — PROGRESS NOTES
Pt ordered for Home O2 evaluaiton last night.  Spoke with Dr. Duval.  Will assess when pt is ready for discharge.

## 2024-07-03 NOTE — PROGRESS NOTES
[facility]  [address] [place] [address] [phone]      Medical Progress Note      NAME: Debra Moser   :  1960  MRM:  [id number]    Date/Time of service: 7/3/2024        Subjective:     Chief Complaint:  Patient was personally seen and examined by me during this time period.  Chart reviewed. FU PE. Reports shoulder and throat pain w/ eating. Family at bedside during evaluation          Objective:       Vitals:       Last 24hrs VS reviewed since prior progress note. Most recent are:    Vitals:    24 0740   BP: 110/61   Pulse: 89   Resp: 15   Temp: 97.5 °F (36.4 °C)   SpO2: 95%     SpO2 Readings from Last 6 Encounters:   24 95%   24 95%   24 92%   24 94%   24 94%   24 94%          Intake/Output Summary (Last 24 hours) at 7/3/2024 0744  Last data filed at 7/3/2024 0403  Gross per 24 hour   Intake --   Output 950 ml   Net -950 ml        Exam:     Physical Exam:    Gen:   frail, in no acute distress  HEENT:  Pink conjunctivae, PERRL, hearing intact to voice  Resp:  No accessory muscle use, clear breath sounds without wheezes rales or rhonchi  Card:  RRR, No murmurs, normal S1, S2  Abd:  Soft, non-tender, non-distended, normoactive bowel sounds are present, no palpable organomegaly   Lymph:  No cervical adenopathy  Musc:  No cyanosis or clubbing  Skin:  No rashes or ulcers, skin turgor is good  Neuro:  Cranial nerves 3-12 are grossly intact, follows commands appropriately  Psych:  fair insight      Medications Reviewed: (see below)    Lab Data Reviewed: (see below)    ______________________________________________________________________    Medications:     Current Facility-Administered Medications   Medication Dose Route Frequency    sodium chloride flush 0.9 % injection 5-40 mL  5-40 mL IntraVENous 2 times per day    sodium chloride flush 0.9 % injection 5-40 mL  5-40 mL IntraVENous PRN    0.9 % sodium chloride  patient during this time period**                 Care Plan discussed with: Patient and Nursing Staff    Discussed:  Care Plan    Prophylaxis:  Lovenox    Disposition:   PT, OT, RN           ___________________________________________________    Attending Physician: Diane Duval DO

## 2024-07-03 NOTE — CONSULTS
[facility] [place] at [facility]  [address], [address] [facility] [address]  W: [phone]  F: [phone]      Reason for Visit:   Debra Moser is a 64 y.o. female who is seen today for evaluation of breast cancer/ now PE    Hematology/Oncology Treatment History:     2016:  Original right stage 3 breast cancer, s/p mastectomy with 5 cm IDC, 3/19 LN, ER negative, RI negative, HER 2 +, treated with chemotherapy (unknown) and XRT (in [facility])  9/2018:  recurrent breast cancer to her neck, tx with docetaxel q 3 weeks x 9, completed 4/12/2019 (PD) ([facility])  6/25/19 L ax LN FNA:  poorly differentiated carcinoma, c/w breast cancer, ER negative, RI negative, HER 2 positive at IHC 3+  Tempus testing:  HER 2 overexpression  7/26/19 Trastuzumab/pertuzumab/paclitaxel until 7/2021 after 32 cycles (PD) ([place], [place])  XRT to right neck 1/2021  T-DM1 8/10/21- T-Dxd  T-Dxd 7/2022- 9/26/23   Hold 9/26/23 due to gr 1 ILD - discontinued due to continued concern for chronic ILD on CT  Right shoulder dislocation and infection 11/17/22  Zometa 9/5/2023 - current  Bronch, BAL, malignant cells present, compatible with carcinoma, not able to do receptors  Trastuzumab 11/21/23 - 1/23/24 (PD)  Tucatinib 11/13/23 - 2/13/24 (PD)  Capecitabine 11/23/23 - 2/13/24 (PD)  T-Dxd 2/20/24- 6/4/24 (PD)  Margetuximab + eribulin 6/18/24-current  Right thoracentesis 6/14/24 1500 mL removed - + for metastatic carcinoma, ER/RI -, HER2 + by IHC    History of Present Illness:   Debra Moser is a pleasant 64 y.o. female who was admitted on 7/2/24  for SOB, caough and chest pain. ED imaging reveals Pulmonary embolus/ . BLE doppler venous negative for BLE  DVT     Hx obtained with use of AMN interpretation machine :  # [id number]  Pt reports feeling better. Was having problems swallowing but that has improved. Denies problems with liquids or solids. Remains tired due to cancer tx. Bowels normal       #Right pleural effusion.  Thoracentesis 6/14/24 with cytology + for metastatic breast cancer, ER/TX -, HER2 positive by IHC.   --rt thoracentesis 250 ml 7/1/24     Plan reviewed with Dr Summers covering physician for Dr Underwood.       Signed By: Ann Schoeneweis, APRN - NP   7/3/24 at 4:19 PM EDT         Session ID: [id number]  Language: Mongolian   ID: #[id number]   Name: Valencia ROUSE:  Patient reports that she came to the hospital since she has had difficulty swallowing food but notes that this has improved; also reports that she had shortness of breath but this overall is better. Reports that she is without any bleeding. Notes that she is eager to go home. Her son joined the hospital visit later in the discussion for which she gave permission to update him with her care. She denies any fevers.    Review of Systems Provided by:  Patient  Review of Systems: A complete review of systems was obtained, reviewed.  Pertinent findings reviewed above.    O:  Vitals:    07/03/24 1509   BP: 103/62   Pulse: 90   Resp: 17   Temp: 97.9 °F (36.6 °C)   SpO2: 93%     Physical Exam  Constitutional: No acute distress., Non-toxic appearance., and Chronic ill appearance.  HENT: Normocephalic and atraumatic head; wearing a head covering.  Eyes: Anicteric sclerae.  Cardiovascular: S1,S2 auscultated. No pitting edema.  Pulmonary: Normal Respiratory Effort. No wheezing. No rhonchi. No rales.   Abdominal: Normal bowel sounds. Soft Abdomen to palpation. No abdominal tenderness. No guarding.   Skin: No jaundice. No rash.   Musculoskeletal: No muscle pain on palpation. No temporal muscle wasting on inspection.  Neurological: Alert and oriented. No tremor on inspection.    Psychiatric: mood normal. normal speech rate normal affect    Lab Results   Component Value Date/Time    WBC 1.9 07/03/2024 05:57 AM    HGB 9.0 07/03/2024 05:57 AM    HCT 28.6 07/03/2024 05:57 AM     07/03/2024 05:57 AM    MCV 93.5 07/03/2024 05:57

## 2024-07-03 NOTE — CONSULTS
Prior to Admission medications    Medication Sig Start Date End Date Taking? Authorizing Provider   amLODIPine (NORVASC) 5 MG tablet Take 1 tablet by mouth daily 5/14/24   Doyle Underwood MD   OLANZapine (ZYPREXA) 2.5 MG tablet Take 1 tablet by mouth nightly for 10 days following each chemotherapy treatment.  Patient not taking: Reported on 6/25/2024 4/23/24   Rochelle Florian APRN - NP   lidocaine viscous hcl (XYLOCAINE) 2 % SOLN solution Take 15 mLs by mouth every 3 hours as needed for Irritation  Patient not taking: Reported on 6/18/2024 4/23/24   Rochelle Florian APRN - NP   ondansetron (ZOFRAN) 8 MG tablet Take 1 tablet by mouth every 8 hours as needed for Nausea or Vomiting  Patient not taking: Reported on 6/25/2024 8/14/23   Doyle Underwood MD   prochlorperazine (COMPAZINE) 10 MG tablet Take 1 tablet by mouth every 6 hours as needed (for nausea)  Patient not taking: Reported on 6/25/2024 8/14/23   Doyle Underwood MD   lidocaine-prilocaine (EMLA) 2.5-2.5 % cream Apply topically as needed. 8/14/23   Doyle Underwood MD     @[facility]@  No Known Allergies   Social History     Tobacco Use    Smoking status: Never    Smokeless tobacco: Never   Substance Use Topics    Alcohol use: Not Currently      No family history on file.       Laboratory: I have personally reviewed the critical care flowsheet and labs.     Recent Labs     07/02/24  0944 07/03/24  0557   WBC 2.0* 1.9*   HGB 10.7* 9.0*   HCT 33.9* 28.6*    341     Recent Labs     07/02/24  0944 07/02/24 1944 07/03/24  0557     --  139   K 3.6  --  3.4*     --  106   CO2 26  --  26   BUN 6  --  6   MG  --  1.6 1.8   PHOS  --  3.4 3.2   ALT 10*  --  8*       Objective:     Mode Rate Tidal Volume Pressure FiO2 PEEP                    Vital Signs:     TMAX(24)      Intake/Output:   Last shift:         Last 3 shifts: No intake/output data recorded.[id number]  Intake/Output Summary (Last 24 hours) at 7/3/2024 0743  Last data filed at 7/3/2024

## 2024-07-03 NOTE — PROGRESS NOTES
[facility] Dosing Services: Antimicrobial Stewardship Daily Doc  Consult for antibiotic dosing of vanc by Dr. Duval  Indication: CAP  Day of Therapy: 2  Immunocompromised     Ht Readings from Last 1 Encounters:   07/03/24 1.575 m (5' 2\")        Wt Readings from Last 1 Encounters:   07/03/24 59.4 kg (131 lb)      Vancomycin therapy:  Loading dose: Vancomycin 1500 mg x1 dose now/given  Maintenance dose: Vancomycin 1000 mg IV every 12 hours   Dose calculated to approximate a           a. Target AUC/RICKI of 400-600          b. Trough of 15-20 mcg/mL   Last level:  mcg/mL  Plan: WBC 1.9, Scr 0.51, afebrile. Procal 0.17 on 7.2, Will start vanc 1g q12h (~auc 496). Will order a level for janay afternoon.       Non-Kinetic Antimicrobial Dosing Regimen:   Current Regimen:  cefepime 2g q8h D1  Recommendation: continue    Other Antimicrobial   (not dosed by pharmacist) Flagyl D1   Cultures 7/3: MRSA (s/p vanc x1):   7/2: urine:  7/2: blood x1:  7/1: body fluid:  7/1: fungus   Serum Creatinine Lab Results   Component Value Date/Time    CREATININE 0.51 07/03/2024 05:57 AM          Creatinine Clearance Estimated Creatinine Clearance: 88 mL/min (A) (based on SCr of 0.51 mg/dL (L)).     Temp Temp: 97.9 °F (36.6 °C) (Temporal)       WBC Lab Results   Component Value Date/Time    WBC 1.9 07/03/2024 05:57 AM          Procalcitonin Lab Results   Component Value Date/Time    PROCAL 0.17 07/02/2024 07:44 PM      For Antifungals, Metronidazole and Nafcillin: Lab Results   Component Value Date/Time    ALT 8 07/03/2024 05:57 AM    AST 30 07/03/2024 05:57 AM        Pharmacist: Oliva Lion, PharmD, BCPS  [phone]

## 2024-07-04 VITALS
WEIGHT: 131 LBS | DIASTOLIC BLOOD PRESSURE: 60 MMHG | TEMPERATURE: 97.5 F | RESPIRATION RATE: 16 BRPM | SYSTOLIC BLOOD PRESSURE: 103 MMHG | BODY MASS INDEX: 24.11 KG/M2 | HEART RATE: 86 BPM | OXYGEN SATURATION: 90 % | HEIGHT: 62 IN

## 2024-07-04 LAB
BACTERIA SPEC CULT: NORMAL
BACTERIA SPEC CULT: NORMAL
GLUCOSE BLD STRIP.AUTO-MCNC: 129 MG/DL (ref 65–117)
GLUCOSE BLD STRIP.AUTO-MCNC: 151 MG/DL (ref 65–117)
SERVICE CMNT-IMP: ABNORMAL
SERVICE CMNT-IMP: ABNORMAL
SERVICE CMNT-IMP: NORMAL

## 2024-07-04 PROCEDURE — 94618 PULMONARY STRESS TESTING: CPT

## 2024-07-04 PROCEDURE — 94761 N-INVAS EAR/PLS OXIMETRY MLT: CPT

## 2024-07-04 PROCEDURE — 2580000003 HC RX 258: Performed by: INTERNAL MEDICINE

## 2024-07-04 PROCEDURE — 6360000002 HC RX W HCPCS: Performed by: INTERNAL MEDICINE

## 2024-07-04 PROCEDURE — 2700000000 HC OXYGEN THERAPY PER DAY

## 2024-07-04 PROCEDURE — 82962 GLUCOSE BLOOD TEST: CPT

## 2024-07-04 RX ORDER — CEFDINIR 300 MG/1
300 CAPSULE ORAL 2 TIMES DAILY
Qty: 10 CAPSULE | Refills: 0 | Status: SHIPPED | OUTPATIENT
Start: 2024-07-04 | End: 2024-07-09

## 2024-07-04 RX ORDER — DOXYCYCLINE HYCLATE 100 MG
100 TABLET ORAL 2 TIMES DAILY
Qty: 14 TABLET | Refills: 0 | Status: SHIPPED | OUTPATIENT
Start: 2024-07-04 | End: 2024-07-11

## 2024-07-04 RX ADMIN — SODIUM CHLORIDE 100 ML: 9 INJECTION, SOLUTION INTRAVENOUS at 03:07

## 2024-07-04 RX ADMIN — METRONIDAZOLE 500 MG: 500 INJECTION, SOLUTION INTRAVENOUS at 05:48

## 2024-07-04 RX ADMIN — VANCOMYCIN HYDROCHLORIDE 1000 MG: 1 INJECTION, POWDER, LYOPHILIZED, FOR SOLUTION INTRAVENOUS at 02:35

## 2024-07-04 RX ADMIN — SODIUM CHLORIDE 100 ML: 9 INJECTION, SOLUTION INTRAVENOUS at 03:09

## 2024-07-04 RX ADMIN — CEFEPIME 2000 MG: 2 INJECTION, POWDER, FOR SOLUTION INTRAVENOUS at 10:20

## 2024-07-04 RX ADMIN — CEFEPIME 2000 MG: 2 INJECTION, POWDER, FOR SOLUTION INTRAVENOUS at 02:30

## 2024-07-04 RX ADMIN — ENOXAPARIN SODIUM 60 MG: 100 INJECTION SUBCUTANEOUS at 09:30

## 2024-07-04 ASSESSMENT — PAIN SCALES - GENERAL
PAINLEVEL_OUTOF10: 0
PAINLEVEL_OUTOF10: 0

## 2024-07-04 NOTE — PROGRESS NOTES
07/04/24 1306   Resting (Room Air)   SpO2 90      During Walk (Room Air)   SpO2 92      Walk/Assistance Device Ambulation   After Walk   SpO2 90      O2 Device Other (comment)  (Test started and completed on room air)   Comments Test preformed on RA  and completed without complication.   Does the Patient Qualify for Home O2 No   Does the Patient Need Portable Oxygen Tanks No

## 2024-07-04 NOTE — PLAN OF CARE
Problem: Pain  Goal: Verbalizes/displays adequate comfort level or baseline comfort level  Outcome: Progressing     Problem: Discharge Planning  Goal: Discharge to home or other facility with appropriate resources  Outcome: Progressing     Problem: Safety - Adult  Goal: Free from fall injury  Outcome: Progressing     Problem: Chronic Conditions and Co-morbidities  Goal: Patient's chronic conditions and co-morbidity symptoms are monitored and maintained or improved  Outcome: Progressing

## 2024-07-04 NOTE — PROGRESS NOTES
To assist primary nurse with discharge, I have completed the AVS Med updates and added information on new medications to the AVS. Care Plans and Education were resolved and the AVS was printed and placed on the chart for the nurse.

## 2024-07-04 NOTE — PROGRESS NOTES
[facility]  Mrs. Lobito Moser is Sikhism. She was not available for a visit. Prayer for spiritual communion offered for her well-being.     Sr. ADRIAN Ch, RN, ACSW, LCSW   Page:  [phone]

## 2024-07-04 NOTE — DISCHARGE SUMMARY
[facility]  [address] [place], [place] [id number] [phone]          Hospitalist Discharge Summary     Patient ID:  Debra Moser  [id number]  64 y.o.  1960    Admit date: 7/2/2024    Discharge date and time: 7/4/2024 11:38 AM    Admission Diagnoses: Pulmonary embolism and infarction (HCC) [I26.99]  Pleural effusion on right [J90]  Multiple subsegmental pulmonary emboli without acute cor pulmonale (HCC) [I26.94]  Bilateral pulmonary infiltrates [R91.8]    Discharge Diagnoses:  Principal Diagnosis Pulmonary embolism and infarction (HCC)                                            Principal Problem:    Pulmonary embolism and infarction (HCC)  Active Problems:    Metastasis from breast cancer (HCC)    Malignant neoplasm of breast in female, estrogen receptor negative (HCC)    Hypoalbuminemia    Hypertension    Hx of completed stroke    DM type 2 causing vascular disease (HCC)    Anemia    Neutropenia (HCC)    Pleural effusion  Resolved Problems:    * No resolved hospital problems. *         Hospital Course:     Pulmonary embolism and infarction POA: suspect due to underlying malignancy.  neg LE dopplers.  ECHO  w/no over strain.  Sp therapeutic lovenox; eliquis on DC.  Hematology evaluated; fu op      Hypoxia POA: suspect due to PE and possibly due to right pleural effusion. 02 testing prior to DC. Fu pulm op      Pleural effusion POA: suspect due to malignancy. Recent thoracentesis 7/01 prior to admission. Repeat imaging op. Evaluated by thoracic surgery who rec no intervention from surgical standpoint. Pulm also evaluated and no acute concerns     Concern for PNA: infiltrates on CT. Follow cx. Sp IV vanc, cefepime and flagyl; omnicef and docy on DC      Malignant neoplasm of breast in female, estrogen receptor negative with metastasis to bone and liver POA: she is on chemotherapy.  Fu hematology op     Anemia/ Leukopenia POA: suspect multifactorial due to chronic

## 2024-07-04 NOTE — PROGRESS NOTES
[facility]     Name: Debra Moser MRN: [id number]   : 1960 Hospital: [facility]   Date: 2024        Impression Plan   Acute respiratory failure  Hypoxia  Pulmonary embolism  Metastatic breast cancer  Pleural effusions               Wean O2 as tolerated, keeping sats above 90%  Needs home O2 assessment prior to discharge  Agree with therapeutic lovenox, can transition to Eliquis at discharge per hematology/oncology  Pleural effusion is small- no intervention needed. Air bubbles likely secondary to thoracentesis the day before. Follow-up on pleural fluid studies  empiric antibiotics, would discharge home on omnicef/doxy to complete a 7 day course of abx  Repeat CT in 6-8 weeks  OOB into chair.     She is stable for discharge from a pulmonary standpoint once home O2 needs determined.           Radiology  ( personally reviewed) CT chest reviewed- Bilateral infiltrates. Small right pleural effusion   ABG Invalid input(s): \"PHI\", \"PO2I\", \"PCO2I\"       Subjective     Cc: shortness of breath    63 yo with PMHx of metastatic breast cancer presenting with increasing shortness of breath after thoracentesis the day before. CTA chest revealed a small right pleural effusion with air bubbles, bilateral infiltrates and a right sided PE. Pt currently on 2 liters and states she feels better.     Interval history:  No acute events. She has a mild cough, but improved. Denies chest pain. No shortness of breath. Overall feeling much better. Satting well on 2L.    Review of Systems:  Pertinent items are noted in HPI.    Past Medical History:   Diagnosis Date    DM type 2 causing vascular disease (HCC)     Hx of completed stroke     L posterior parietal    HX: breast cancer     Hypertension     Metastasis from breast cancer (HCC)     liver, lung, bone, lymph nodes      Past Surgical History:   Procedure Laterality Date    BRONCHOSCOPY N/A 2023    BRONCHOSCOPY performed by  --  3.4*     --  106   CO2 26  --  26   BUN 6  --  6   MG  --  1.6 1.8   PHOS  --  3.4 3.2   ALT 10*  --  8*         Objective:     Mode Rate Tidal Volume Pressure FiO2 PEEP                    Vital Signs:     TMAX(24)      Intake/Output:   Last shift:         Last 3 shifts: No intake/output data recorded.[id number] intake or output data in the 24 hours ending 07/04/24 0950    EXAM:   GENERAL: awake, alert, thin HEENT:  PERRL, EOMI, no alar flaring or epistaxis, oral mucosa moist without cyanosis, NECK:  no jugular vein distention, no retractions, no thyromegaly or masses, LUNGS: CTA, no w/r/r HEART:  Regular rate and rhythm with no MGR; no edema is present, ABDOMEN:  soft with no tenderness, bowel sounds present, EXTREMITIES:  warm with no cyanosis, SKIN:  no jaundice or ecchymosis, and NEUROLOGIC:  alert and oriented, grossly non-focal    Ingrid Rainey MD  [facility]

## 2024-07-04 NOTE — DISCHARGE INSTRUCTIONS
medicines your doctor prescribes.  If you are taking a blood thinner, be sure you get instructions about how to take your medicine safely. Blood thinners can cause serious bleeding problems.  Try to walk several times a day. Walking helps keep blood moving in your legs. Before doing other types of exercise, ask your doctor what type and level of exercise is safe for you.  Take steps to help prevent blood clots in your legs. For example:  Exercise your lower leg muscles if you sit for long periods of time. Pump your feet up and down by pulling your toes up toward your knees then pointing them down. Repeat.  After an illness or surgery, try to get up and out of bed often. If you can't get out of bed, flex your feet every hour to keep the blood moving through your legs.  Take plenty of breaks when you travel. On long car trips, stop the car and walk around every hour or so. On the bus, plane, or train, get out of your seat and walk up and down the aisle every hour, if you can.  Wear compression stockings if your doctor recommends them.  Check with your doctor about whether you should use hormonal forms of birth control or hormone therapy. These may increase your risk of blood clots.  Have a healthy lifestyle. This includes being active, staying at a healthy weight, and not smoking.  Get vaccinated against COVID-19, the flu, and pneumonia.  When should you call for help?   Call 911 anytime you think you may need emergency care. For example, call if:    You have shortness of breath.     You have chest pain.     You passed out (lost consciousness).     You cough up blood.   Call your doctor now or seek immediate medical care if:    You have new or worsening pain or swelling in your leg.   Watch closely for changes in your health, and be sure to contact your doctor if:    You do not get better as expected.   Where can you learn more?  Go to https://www.healthwise.net/patientEd and enter A877 to learn more about \"Pulmonary

## 2024-07-05 LAB
ACID FAST STN SPEC: NEGATIVE
CYTOLOGY-NON GYN: NORMAL
MYCOBACTERIUM SPEC QL CULT: NORMAL
SPECIMEN PREPARATION: NORMAL
SPECIMEN SOURCE: NORMAL

## 2024-07-07 LAB
BACTERIA SPEC CULT: NORMAL
BACTERIA SPEC CULT: NORMAL
GRAM STN SPEC: NORMAL
SERVICE CMNT-IMP: NORMAL
SERVICE CMNT-IMP: NORMAL

## 2024-07-08 LAB
BACTERIA SPEC CULT: NORMAL
BACTERIA SPEC CULT: NORMAL
FUNGUS SPEC FUNGUS STN: NORMAL
FUNGUS STAIN: NORMAL
SERVICE CMNT-IMP: NORMAL
SERVICE CMNT-IMP: NORMAL
SPECIMEN SOURCE: NORMAL
SPECIMEN SOURCE: NORMAL

## 2024-07-09 ENCOUNTER — HOSPITAL ENCOUNTER (OUTPATIENT)
Facility: HOSPITAL | Age: 64
Setting detail: INFUSION SERIES
Discharge: HOME | End: 2024-07-09

## 2024-07-09 ENCOUNTER — OFFICE VISIT (OUTPATIENT)
Age: 64
End: 2024-07-09

## 2024-07-09 ENCOUNTER — APPOINTMENT (OUTPATIENT)
Facility: HOSPITAL | Age: 64
End: 2024-07-09
Payer: MEDICAID

## 2024-07-09 VITALS
HEART RATE: 92 BPM | OXYGEN SATURATION: 93 % | SYSTOLIC BLOOD PRESSURE: 119 MMHG | WEIGHT: 130.8 LBS | BODY MASS INDEX: 24.07 KG/M2 | TEMPERATURE: 98.1 F | DIASTOLIC BLOOD PRESSURE: 64 MMHG | RESPIRATION RATE: 16 BRPM | HEIGHT: 62 IN

## 2024-07-09 VITALS
SYSTOLIC BLOOD PRESSURE: 131 MMHG | TEMPERATURE: 98.1 F | HEIGHT: 62 IN | HEART RATE: 85 BPM | BODY MASS INDEX: 24.05 KG/M2 | OXYGEN SATURATION: 93 % | DIASTOLIC BLOOD PRESSURE: 60 MMHG | WEIGHT: 130.7 LBS

## 2024-07-09 DIAGNOSIS — C50.919 CARCINOMA OF BREAST METASTATIC TO BONE, UNSPECIFIED LATERALITY (HCC): ICD-10-CM

## 2024-07-09 DIAGNOSIS — Z51.11 ENCOUNTER FOR ANTINEOPLASTIC CHEMOTHERAPY: Primary | ICD-10-CM

## 2024-07-09 DIAGNOSIS — Z17.1 MALIGNANT NEOPLASM OF OVERLAPPING SITES OF RIGHT BREAST IN FEMALE, ESTROGEN RECEPTOR NEGATIVE (HCC): Primary | ICD-10-CM

## 2024-07-09 DIAGNOSIS — Z17.1 MALIGNANT NEOPLASM OF BREAST IN FEMALE, ESTROGEN RECEPTOR NEGATIVE, UNSPECIFIED LATERALITY, UNSPECIFIED SITE OF BREAST (HCC): ICD-10-CM

## 2024-07-09 DIAGNOSIS — C50.811 MALIGNANT NEOPLASM OF OVERLAPPING SITES OF RIGHT BREAST IN FEMALE, ESTROGEN RECEPTOR NEGATIVE (HCC): Primary | ICD-10-CM

## 2024-07-09 DIAGNOSIS — C79.9 METASTASIS FROM BREAST CANCER (HCC): ICD-10-CM

## 2024-07-09 DIAGNOSIS — C50.919 METASTASIS FROM BREAST CANCER (HCC): ICD-10-CM

## 2024-07-09 DIAGNOSIS — C50.919 MALIGNANT NEOPLASM OF BREAST IN FEMALE, ESTROGEN RECEPTOR NEGATIVE, UNSPECIFIED LATERALITY, UNSPECIFIED SITE OF BREAST (HCC): ICD-10-CM

## 2024-07-09 DIAGNOSIS — C79.51 CARCINOMA OF BREAST METASTATIC TO BONE, UNSPECIFIED LATERALITY (HCC): ICD-10-CM

## 2024-07-09 LAB
ALBUMIN SERPL-MCNC: 2.7 G/DL (ref 3.5–5)
ALBUMIN/GLOB SERPL: 0.6 (ref 1.1–2.2)
ALP SERPL-CCNC: 91 U/L (ref 45–117)
ALT SERPL-CCNC: 11 U/L (ref 12–78)
ANION GAP SERPL CALC-SCNC: 7 MMOL/L (ref 5–15)
AST SERPL-CCNC: 38 U/L (ref 15–37)
BASOPHILS # BLD: 0.1 K/UL (ref 0–0.1)
BASOPHILS NFR BLD: 1 % (ref 0–1)
BILIRUB SERPL-MCNC: 0.3 MG/DL (ref 0.2–1)
BUN SERPL-MCNC: 12 MG/DL (ref 6–20)
BUN/CREAT SERPL: 17 (ref 12–20)
CALCIUM SERPL-MCNC: 8.8 MG/DL (ref 8.5–10.1)
CHLORIDE SERPL-SCNC: 103 MMOL/L (ref 97–108)
CO2 SERPL-SCNC: 28 MMOL/L (ref 21–32)
CREAT SERPL-MCNC: 0.72 MG/DL (ref 0.55–1.02)
DIFFERENTIAL METHOD BLD: ABNORMAL
EOSINOPHIL # BLD: 0 K/UL (ref 0–0.4)
EOSINOPHIL NFR BLD: 0 % (ref 0–7)
ERYTHROCYTE [DISTWIDTH] IN BLOOD BY AUTOMATED COUNT: 17.1 % (ref 11.5–14.5)
GLOBULIN SER CALC-MCNC: 4.8 G/DL (ref 2–4)
GLUCOSE SERPL-MCNC: 121 MG/DL (ref 65–100)
HCT VFR BLD AUTO: 31.3 % (ref 35–47)
HGB BLD-MCNC: 9.8 G/DL (ref 11.5–16)
IMM GRANULOCYTES # BLD AUTO: 0 K/UL (ref 0–0.04)
IMM GRANULOCYTES NFR BLD AUTO: 1 % (ref 0–0.5)
LYMPHOCYTES # BLD: 0.9 K/UL (ref 0.8–3.5)
LYMPHOCYTES NFR BLD: 15 % (ref 12–49)
MAGNESIUM SERPL-MCNC: 1.8 MG/DL (ref 1.6–2.4)
MCH RBC QN AUTO: 28.8 PG (ref 26–34)
MCHC RBC AUTO-ENTMCNC: 31.3 G/DL (ref 30–36.5)
MCV RBC AUTO: 92.1 FL (ref 80–99)
MONOCYTES # BLD: 0.6 K/UL (ref 0–1)
MONOCYTES NFR BLD: 11 % (ref 5–13)
NEUTS SEG # BLD: 4.1 K/UL (ref 1.8–8)
NEUTS SEG NFR BLD: 72 % (ref 32–75)
NRBC # BLD: 0 K/UL (ref 0–0.01)
NRBC BLD-RTO: 0 PER 100 WBC
PLATELET # BLD AUTO: 455 K/UL (ref 150–400)
PMV BLD AUTO: 8.7 FL (ref 8.9–12.9)
POTASSIUM SERPL-SCNC: 3.6 MMOL/L (ref 3.5–5.1)
PROT SERPL-MCNC: 7.5 G/DL (ref 6.4–8.2)
RBC # BLD AUTO: 3.4 M/UL (ref 3.8–5.2)
SODIUM SERPL-SCNC: 138 MMOL/L (ref 136–145)
WBC # BLD AUTO: 5.7 K/UL (ref 3.6–11)

## 2024-07-09 PROCEDURE — 99215 OFFICE O/P EST HI 40 MIN: CPT | Performed by: INTERNAL MEDICINE

## 2024-07-09 PROCEDURE — 3074F SYST BP LT 130 MM HG: CPT | Performed by: INTERNAL MEDICINE

## 2024-07-09 PROCEDURE — 85025 COMPLETE CBC W/AUTO DIFF WBC: CPT

## 2024-07-09 PROCEDURE — 96375 TX/PRO/DX INJ NEW DRUG ADDON: CPT

## 2024-07-09 PROCEDURE — 96413 CHEMO IV INFUSION 1 HR: CPT

## 2024-07-09 PROCEDURE — 6360000002 HC RX W HCPCS: Performed by: INTERNAL MEDICINE

## 2024-07-09 PROCEDURE — 80053 COMPREHEN METABOLIC PANEL: CPT

## 2024-07-09 PROCEDURE — 2580000003 HC RX 258: Performed by: INTERNAL MEDICINE

## 2024-07-09 PROCEDURE — 3078F DIAST BP <80 MM HG: CPT | Performed by: INTERNAL MEDICINE

## 2024-07-09 PROCEDURE — 96409 CHEMO IV PUSH SNGL DRUG: CPT

## 2024-07-09 PROCEDURE — 83735 ASSAY OF MAGNESIUM: CPT

## 2024-07-09 PROCEDURE — 36415 COLL VENOUS BLD VENIPUNCTURE: CPT

## 2024-07-09 RX ORDER — DIPHENHYDRAMINE HYDROCHLORIDE 50 MG/ML
50 INJECTION INTRAMUSCULAR; INTRAVENOUS
Status: CANCELLED | OUTPATIENT
Start: 2024-07-16

## 2024-07-09 RX ORDER — ONDANSETRON 2 MG/ML
8 INJECTION INTRAMUSCULAR; INTRAVENOUS
Status: CANCELLED | OUTPATIENT
Start: 2024-07-16

## 2024-07-09 RX ORDER — PROCHLORPERAZINE EDISYLATE 5 MG/ML
10 INJECTION INTRAMUSCULAR; INTRAVENOUS
Status: CANCELLED | OUTPATIENT
Start: 2024-07-16

## 2024-07-09 RX ORDER — SODIUM CHLORIDE 0.9 % (FLUSH) 0.9 %
5-40 SYRINGE (ML) INJECTION PRN
Status: DISCONTINUED | OUTPATIENT
Start: 2024-07-09 | End: 2024-07-10 | Stop reason: HOSPADM

## 2024-07-09 RX ORDER — ONDANSETRON 2 MG/ML
8 INJECTION INTRAMUSCULAR; INTRAVENOUS ONCE
Status: COMPLETED | OUTPATIENT
Start: 2024-07-09 | End: 2024-07-09

## 2024-07-09 RX ORDER — SODIUM CHLORIDE 9 MG/ML
INJECTION, SOLUTION INTRAVENOUS CONTINUOUS
Status: CANCELLED | OUTPATIENT
Start: 2024-07-16

## 2024-07-09 RX ORDER — SODIUM CHLORIDE 9 MG/ML
5-250 INJECTION, SOLUTION INTRAVENOUS PRN
Status: DISCONTINUED | OUTPATIENT
Start: 2024-07-09 | End: 2024-07-10 | Stop reason: HOSPADM

## 2024-07-09 RX ORDER — SODIUM CHLORIDE 9 MG/ML
5-250 INJECTION, SOLUTION INTRAVENOUS PRN
Status: CANCELLED | OUTPATIENT
Start: 2024-07-16

## 2024-07-09 RX ORDER — ALBUTEROL SULFATE 90 UG/1
4 AEROSOL, METERED RESPIRATORY (INHALATION) PRN
Status: CANCELLED | OUTPATIENT
Start: 2024-07-16

## 2024-07-09 RX ORDER — FAMOTIDINE 10 MG/ML
20 INJECTION, SOLUTION INTRAVENOUS
Status: CANCELLED | OUTPATIENT
Start: 2024-07-16

## 2024-07-09 RX ORDER — EPINEPHRINE 1 MG/ML
0.3 INJECTION, SOLUTION INTRAMUSCULAR; SUBCUTANEOUS PRN
Status: CANCELLED | OUTPATIENT
Start: 2024-07-16

## 2024-07-09 RX ORDER — ERIBULIN MESYLATE 0.5 MG/ML
1.4 INJECTION INTRAVENOUS ONCE
Status: COMPLETED | OUTPATIENT
Start: 2024-07-09 | End: 2024-07-09

## 2024-07-09 RX ORDER — HEPARIN 100 UNIT/ML
500 SYRINGE INTRAVENOUS PRN
Status: CANCELLED | OUTPATIENT
Start: 2024-07-16

## 2024-07-09 RX ORDER — ACETAMINOPHEN 325 MG/1
650 TABLET ORAL
Status: CANCELLED | OUTPATIENT
Start: 2024-07-16

## 2024-07-09 RX ADMIN — ERIBULIN MESYLATE 2.3 MG: 0.5 INJECTION INTRAVENOUS at 14:35

## 2024-07-09 RX ADMIN — ONDANSETRON 8 MG: 2 INJECTION INTRAMUSCULAR; INTRAVENOUS at 14:01

## 2024-07-09 RX ADMIN — MARGETUXIMAB-CMKB 912.5 MG: 25 INJECTION, SOLUTION, CONCENTRATE INTRAVENOUS at 14:46

## 2024-07-09 RX ADMIN — ALTEPLASE 2 MG: 2.2 INJECTION, POWDER, LYOPHILIZED, FOR SOLUTION INTRAVENOUS at 11:50

## 2024-07-09 RX ADMIN — SODIUM CHLORIDE 25 ML/HR: 9 INJECTION, SOLUTION INTRAVENOUS at 14:00

## 2024-07-09 RX ADMIN — HYDROCORTISONE SODIUM SUCCINATE 100 MG: 100 INJECTION, POWDER, FOR SOLUTION INTRAMUSCULAR; INTRAVENOUS at 14:04

## 2024-07-09 ASSESSMENT — PAIN SCALES - GENERAL: PAINLEVEL_OUTOF10: 0

## 2024-07-09 NOTE — PROGRESS NOTES
[facility]  [address], [address] [facility] [address]  W: [phone]  F: [phone]      Reason for Visit:   Debra Moser is a 64 y.o. female who has transferred to me from [place].    Treatment History:   2016:  Original right stage 3 breast cancer, s/p mastectomy with 5 cm IDC, 3/19 LN, ER negative, AZ negative, HER 2 +, treated with chemotherapy (unknown) and XRT (in [facility])  9/2018:  recurrent breast cancer to her neck, tx with docetaxel q 3 weeks x 9, completed 4/12/2019 (PD) ([facility])  6/25/19 L ax LN FNA:  poorly differentiated carcinoma, c/w breast cancer, ER negative, AZ negative, HER 2 positive at IHC 3+  [facility] testing:  HER 2 overexpression  7/26/19 Trastuzumab/pertuzumab/paclitaxel until 7/2021 after 32 cycles (PD) ([place], [place])  XRT to right neck 1/2021  T-DM1 8/10/21- T-Dxd  T-Dxd 7/2022- 9/26/23   Hold 9/26/23 due to gr 1 ILD - discontinued due to continued concern for chronic ILD on CT  Right shoulder dislocation and infection 11/17/22  Zometa 9/5/2023 - current  Bronch, BAL, malignant cells present, compatible with carcinoma, not able to do receptors  Trastuzumab 11/21/23 - 1/23/24 (PD)  Tucatinib 11/13/23 - 2/13/24 (PD)  Capecitabine 11/23/23 - 2/13/24 (PD)  T-Dxd 2/20/24- 6/4/24 (PD)  Margetuximab + eribulin 6/18/24-current  Right thoracentesis 6/14/24 1500 mL removed - + for metastatic carcinoma, ER/AZ -, HER2 + by IHC    History of Present Illness:   Recently moved to [place] to live with daughter.    Interval Hx: Patient presents today for follow up and treatment.  Reports gr 1 cough today.     Entire visit was done with Michelle from [place].    Was admitted from 7/2/24-7/4/24 for PE and PNA    Past Medical History:   Diagnosis Date    DM type 2 causing vascular disease (HCC)     Hx of completed stroke     L posterior parietal    HX: breast cancer     Hypertension     Metastasis from breast cancer (HCC)     liver,

## 2024-07-09 NOTE — PROGRESS NOTES
[facility] Progress Note    Date: 2024    Name: Debra Moser    MRN: [id number]         : 1960    Ms. Lobito Moser Arrived ambulatory and in no distress for C2D1 of Eribulin + Margetuximab.  Assessment was completed via , no acute issues at this time, no new complaints voiced.  LCW chest wall port accessed without difficulty, labs drawn & sent for processing.    Ms. Lobito Moser's vitals were reviewed.  Vitals:    24 1115   BP: 119/64   Pulse: 92   Temp: 98.1 °F (36.7 °C)   SpO2: 93%     Magnesium <2. Notified MD office and received ok to treat.    Lab results were obtained and reviewed.  Recent Results (from the past 12 hour(s))   CBC with Auto Differential    Collection Time: 24 11:35 AM   Result Value Ref Range    WBC 5.7 3.6 - 11.0 K/uL    RBC 3.40 (L) 3.80 - 5.20 M/uL    Hemoglobin 9.8 (L) 11.5 - 16.0 g/dL    Hematocrit 31.3 (L) 35.0 - 47.0 %    MCV 92.1 80.0 - 99.0 FL    MCH 28.8 26.0 - 34.0 PG    MCHC 31.3 30.0 - 36.5 g/dL    RDW 17.1 (H) 11.5 - 14.5 %    Platelets 455 (H) 150 - 400 K/uL    MPV 8.7 (L) 8.9 - 12.9 FL    Nucleated RBCs 0.0 0  WBC    nRBC 0.00 0.00 - 0.01 K/uL    Neutrophils % 72 32 - 75 %    Lymphocytes % 15 12 - 49 %    Monocytes % 11 5 - 13 %    Eosinophils % 0 0 - 7 %    Basophils % 1 0 - 1 %    Immature Granulocytes % 1 (H) 0.0 - 0.5 %    Neutrophils Absolute 4.1 1.8 - 8.0 K/UL    Lymphocytes Absolute 0.9 0.8 - 3.5 K/UL    Monocytes Absolute 0.6 0.0 - 1.0 K/UL    Eosinophils Absolute 0.0 0.0 - 0.4 K/UL    Basophils Absolute 0.1 0.0 - 0.1 K/UL    Immature Granulocytes Absolute 0.0 0.00 - 0.04 K/UL    Differential Type AUTOMATED     Comprehensive Metabolic Panel    Collection Time: 24 11:35 AM   Result Value Ref Range    Sodium 138 136 - 145 mmol/L    Potassium 3.6 3.5 - 5.1 mmol/L    Chloride 103 97 - 108 mmol/L    CO2 28 21 - 32 mmol/L    Anion Gap 7 5 - 15 mmol/L    Glucose 121 (H) 65 - 100 mg/dL    BUN 12 6 - 20 MG/DL    Creatinine

## 2024-07-10 ENCOUNTER — TELEPHONE (OUTPATIENT)
Age: 64
End: 2024-07-10

## 2024-07-10 NOTE — TELEPHONE ENCOUNTER
7/10/24- Called patient using AMN  #[id number] to discuss financial assistance for Eliquis and explained to patient that the assistance is thru BMSPAF and a application will need to be signed. Patient stated she will be in the office on 7/11/24 to sign the application.

## 2024-07-15 LAB
BACTERIA SPEC CULT: NORMAL
SERVICE CMNT-IMP: NORMAL

## 2024-07-16 ENCOUNTER — HOSPITAL ENCOUNTER (OUTPATIENT)
Facility: HOSPITAL | Age: 64
Setting detail: INFUSION SERIES
Discharge: HOME | End: 2024-07-16

## 2024-07-16 ENCOUNTER — OFFICE VISIT (OUTPATIENT)
Age: 64
End: 2024-07-16

## 2024-07-16 ENCOUNTER — APPOINTMENT (OUTPATIENT)
Facility: HOSPITAL | Age: 64
End: 2024-07-16
Payer: MEDICAID

## 2024-07-16 VITALS
OXYGEN SATURATION: 92 % | TEMPERATURE: 98.8 F | DIASTOLIC BLOOD PRESSURE: 58 MMHG | SYSTOLIC BLOOD PRESSURE: 115 MMHG | HEART RATE: 85 BPM | RESPIRATION RATE: 18 BRPM | WEIGHT: 129.5 LBS | HEIGHT: 62 IN | BODY MASS INDEX: 23.83 KG/M2

## 2024-07-16 VITALS
WEIGHT: 129 LBS | BODY MASS INDEX: 23.74 KG/M2 | OXYGEN SATURATION: 95 % | HEIGHT: 62 IN | SYSTOLIC BLOOD PRESSURE: 122 MMHG | DIASTOLIC BLOOD PRESSURE: 79 MMHG | HEART RATE: 94 BPM | TEMPERATURE: 98 F

## 2024-07-16 DIAGNOSIS — C79.51 CARCINOMA OF BREAST METASTATIC TO BONE, UNSPECIFIED LATERALITY (HCC): ICD-10-CM

## 2024-07-16 DIAGNOSIS — Z17.1 MALIGNANT NEOPLASM OF BREAST IN FEMALE, ESTROGEN RECEPTOR NEGATIVE, UNSPECIFIED LATERALITY, UNSPECIFIED SITE OF BREAST (HCC): ICD-10-CM

## 2024-07-16 DIAGNOSIS — C50.919 MALIGNANT NEOPLASM OF BREAST IN FEMALE, ESTROGEN RECEPTOR NEGATIVE, UNSPECIFIED LATERALITY, UNSPECIFIED SITE OF BREAST (HCC): ICD-10-CM

## 2024-07-16 DIAGNOSIS — Z51.11 ENCOUNTER FOR ANTINEOPLASTIC CHEMOTHERAPY: Primary | ICD-10-CM

## 2024-07-16 DIAGNOSIS — C50.811 MALIGNANT NEOPLASM OF OVERLAPPING SITES OF RIGHT BREAST IN FEMALE, ESTROGEN RECEPTOR NEGATIVE (HCC): Primary | ICD-10-CM

## 2024-07-16 DIAGNOSIS — C50.919 CARCINOMA OF BREAST METASTATIC TO BONE, UNSPECIFIED LATERALITY (HCC): ICD-10-CM

## 2024-07-16 DIAGNOSIS — C50.919 METASTASIS FROM BREAST CANCER (HCC): ICD-10-CM

## 2024-07-16 DIAGNOSIS — C79.9 METASTASIS FROM BREAST CANCER (HCC): ICD-10-CM

## 2024-07-16 DIAGNOSIS — Z17.1 MALIGNANT NEOPLASM OF OVERLAPPING SITES OF RIGHT BREAST IN FEMALE, ESTROGEN RECEPTOR NEGATIVE (HCC): Primary | ICD-10-CM

## 2024-07-16 LAB
ALBUMIN SERPL-MCNC: 2.8 G/DL (ref 3.5–5)
ALBUMIN/GLOB SERPL: 0.6 (ref 1.1–2.2)
ALP SERPL-CCNC: 79 U/L (ref 45–117)
ALT SERPL-CCNC: 11 U/L (ref 12–78)
ANION GAP SERPL CALC-SCNC: 5 MMOL/L (ref 5–15)
AST SERPL-CCNC: 34 U/L (ref 15–37)
BASOPHILS # BLD: 0 K/UL (ref 0–0.1)
BASOPHILS NFR BLD: 1 % (ref 0–1)
BILIRUB SERPL-MCNC: 0.4 MG/DL (ref 0.2–1)
BUN SERPL-MCNC: 7 MG/DL (ref 6–20)
BUN/CREAT SERPL: 10 (ref 12–20)
CALCIUM SERPL-MCNC: 9 MG/DL (ref 8.5–10.1)
CHLORIDE SERPL-SCNC: 104 MMOL/L (ref 97–108)
CO2 SERPL-SCNC: 30 MMOL/L (ref 21–32)
CREAT SERPL-MCNC: 0.69 MG/DL (ref 0.55–1.02)
DIFFERENTIAL METHOD BLD: ABNORMAL
EOSINOPHIL # BLD: 0 K/UL (ref 0–0.4)
EOSINOPHIL NFR BLD: 0 % (ref 0–7)
ERYTHROCYTE [DISTWIDTH] IN BLOOD BY AUTOMATED COUNT: 16.5 % (ref 11.5–14.5)
GLOBULIN SER CALC-MCNC: 4.9 G/DL (ref 2–4)
GLUCOSE SERPL-MCNC: 151 MG/DL (ref 65–100)
HCT VFR BLD AUTO: 30.2 % (ref 35–47)
HGB BLD-MCNC: 9.4 G/DL (ref 11.5–16)
IMM GRANULOCYTES # BLD AUTO: 0 K/UL
IMM GRANULOCYTES NFR BLD AUTO: 0 %
LYMPHOCYTES # BLD: 1.1 K/UL (ref 0.8–3.5)
LYMPHOCYTES NFR BLD: 39 % (ref 12–49)
MAGNESIUM SERPL-MCNC: 2.1 MG/DL (ref 1.6–2.4)
MCH RBC QN AUTO: 28.7 PG (ref 26–34)
MCHC RBC AUTO-ENTMCNC: 31.1 G/DL (ref 30–36.5)
MCV RBC AUTO: 92.1 FL (ref 80–99)
MONOCYTES # BLD: 0.3 K/UL (ref 0–1)
MONOCYTES NFR BLD: 12 % (ref 5–13)
NEUTS SEG # BLD: 1.4 K/UL (ref 1.8–8)
NEUTS SEG NFR BLD: 48 % (ref 32–75)
NRBC # BLD: 0 K/UL (ref 0–0.01)
NRBC BLD-RTO: 0 PER 100 WBC
PLATELET # BLD AUTO: 411 K/UL (ref 150–400)
PMV BLD AUTO: 8.8 FL (ref 8.9–12.9)
POTASSIUM SERPL-SCNC: 3.3 MMOL/L (ref 3.5–5.1)
PROT SERPL-MCNC: 7.7 G/DL (ref 6.4–8.2)
RBC # BLD AUTO: 3.28 M/UL (ref 3.8–5.2)
RBC MORPH BLD: ABNORMAL
SODIUM SERPL-SCNC: 139 MMOL/L (ref 136–145)
WBC # BLD AUTO: 2.8 K/UL (ref 3.6–11)
WBC MORPH BLD: ABNORMAL

## 2024-07-16 PROCEDURE — 6360000002 HC RX W HCPCS: Performed by: INTERNAL MEDICINE

## 2024-07-16 PROCEDURE — 99215 OFFICE O/P EST HI 40 MIN: CPT | Performed by: INTERNAL MEDICINE

## 2024-07-16 PROCEDURE — 2580000003 HC RX 258: Performed by: INTERNAL MEDICINE

## 2024-07-16 PROCEDURE — 80053 COMPREHEN METABOLIC PANEL: CPT

## 2024-07-16 PROCEDURE — 85025 COMPLETE CBC W/AUTO DIFF WBC: CPT

## 2024-07-16 PROCEDURE — 96409 CHEMO IV PUSH SNGL DRUG: CPT

## 2024-07-16 PROCEDURE — 83735 ASSAY OF MAGNESIUM: CPT

## 2024-07-16 PROCEDURE — 96375 TX/PRO/DX INJ NEW DRUG ADDON: CPT

## 2024-07-16 PROCEDURE — 3078F DIAST BP <80 MM HG: CPT | Performed by: INTERNAL MEDICINE

## 2024-07-16 PROCEDURE — 3074F SYST BP LT 130 MM HG: CPT | Performed by: INTERNAL MEDICINE

## 2024-07-16 RX ORDER — SODIUM CHLORIDE 0.9 % (FLUSH) 0.9 %
5-40 SYRINGE (ML) INJECTION PRN
Status: DISCONTINUED | OUTPATIENT
Start: 2024-07-16 | End: 2024-07-17 | Stop reason: HOSPADM

## 2024-07-16 RX ORDER — ONDANSETRON 2 MG/ML
8 INJECTION INTRAMUSCULAR; INTRAVENOUS ONCE
Status: COMPLETED | OUTPATIENT
Start: 2024-07-16 | End: 2024-07-16

## 2024-07-16 RX ORDER — ERIBULIN MESYLATE 0.5 MG/ML
1.4 INJECTION INTRAVENOUS ONCE
Status: COMPLETED | OUTPATIENT
Start: 2024-07-16 | End: 2024-07-16

## 2024-07-16 RX ORDER — SODIUM CHLORIDE 9 MG/ML
5-250 INJECTION, SOLUTION INTRAVENOUS PRN
Status: DISCONTINUED | OUTPATIENT
Start: 2024-07-16 | End: 2024-07-17 | Stop reason: HOSPADM

## 2024-07-16 RX ADMIN — ONDANSETRON 8 MG: 2 INJECTION INTRAMUSCULAR; INTRAVENOUS at 12:34

## 2024-07-16 RX ADMIN — ERIBULIN MESYLATE 2.3 MG: 0.5 INJECTION INTRAVENOUS at 13:33

## 2024-07-16 RX ADMIN — SODIUM CHLORIDE 25 ML/HR: 9 INJECTION, SOLUTION INTRAVENOUS at 12:34

## 2024-07-16 RX ADMIN — SODIUM CHLORIDE, PRESERVATIVE FREE 20 ML: 5 INJECTION INTRAVENOUS at 13:41

## 2024-07-16 ASSESSMENT — PAIN SCALES - GENERAL: PAINLEVEL_OUTOF10: 0

## 2024-07-16 NOTE — PROGRESS NOTES
I have reviewed all needed documentation in preparation for visit. Verified patient by name and date of birth  Debra Moser is a 64 y.o. female Chemotherapy (Malignant neoplasm of overlapping sites of right breast in female, estrogen receptor negative (HCC))  .    Vitals:    07/16/24 1123   BP: 122/79   Pulse: 94   Temp: 98 °F (36.7 °C)   SpO2: 95%   Weight: 58.5 kg (129 lb)   Height: 1.575 m (5' 2\")       No LMP recorded. Patient is postmenopausal.         Health Maintenance Review: Patient reminded of \"due or due soon\" health maintenance. I have asked the patient to contact his/her primary care provider (PCP) for follow-up on his/her health maintenance.    \"Have you been to the ER, urgent care clinic since your last visit?  Hospitalized since your last visit?\"    NO    “Have you seen or consulted any other health care providers outside of [facility] since your last visit?”    NO      
mild, due to cancer and therapy    9. Hypokalemia:  will replete in opic prn    10. L posterior parietal CVA:  referred to neurology, sees them in oct    11. Nausea: recommend scheduled ondansetron and PRN compazine.     12. Mouth sores: Viscous lidocaine ordered previously.     13. Right upper arm edema: venous doppler negative 6/4/24    14. Acute RUL PE:  7/2/24, on eliquis, Ana is in touch with her    15. Constipation:  discussed colace    16. Weight monitoring:  loss of appetite, weight 129 lbs      [phone], fani alexander, son    I appreciate the opportunity to participate in Ms. Debra Alexander's care.    Signed By: Doyle Underwood MD      No follow-ups on file.

## 2024-07-16 NOTE — PROGRESS NOTES
Comment REACTIVE LYMPHS     Comprehensive Metabolic Panel    Collection Time: 07/16/24 11:15 AM   Result Value Ref Range    Sodium 139 136 - 145 mmol/L    Potassium 3.3 (L) 3.5 - 5.1 mmol/L    Chloride 104 97 - 108 mmol/L    CO2 30 21 - 32 mmol/L    Anion Gap 5 5 - 15 mmol/L    Glucose 151 (H) 65 - 100 mg/dL    BUN 7 6 - 20 MG/DL    Creatinine 0.69 0.55 - 1.02 MG/DL    BUN/Creatinine Ratio 10 (L) 12 - 20      Est, Glom Filt Rate >90 >60 ml/min/1.73m2    Calcium 9.0 8.5 - 10.1 MG/DL    Total Bilirubin 0.4 0.2 - 1.0 MG/DL    ALT 11 (L) 12 - 78 U/L    AST 34 15 - 37 U/L    Alk Phosphatase 79 45 - 117 U/L    Total Protein 7.7 6.4 - 8.2 g/dL    Albumin 2.8 (L) 3.5 - 5.0 g/dL    Globulin 4.9 (H) 2.0 - 4.0 g/dL    Albumin/Globulin Ratio 0.6 (L) 1.1 - 2.2           Medications Administered         0.9 % sodium chloride infusion Admin Date  07/16/2024 Action  New Bag Dose  25 mL/hr Rate  25 mL/hr Route  IntraVENous Administered By  Nadja Webb RN        eriBULin mesylate (HALAVEN) injection SOLN 2.3 mg Admin Date  07/16/2024 Action  Given Dose  2.3 mg Rate   Route  IntraVENous Administered By  Nadja Webb RN        ondansetron (ZOFRAN) injection 8 mg Admin Date  07/16/2024 Action  Given Dose  8 mg Rate   Route  IntraVENous Administered By  Nadja Webb RN        sodium chloride flush 0.9 % injection 5-40 mL Admin Date  07/16/2024 Action  Given Dose  20 mL Rate   Route  IntraVENous Administered By  Nadja Webb RN              Two nurses verified prior to administering:    Drug name  Drug dose  Infusion volume or drug volume when prepared in a syringe  Rate of administration  Route of administration  Expiration dates and/or times  Appearance and physical integrity of the drugs  Rate set on infusion pump, when used  Sequencing of drug administration        Ms. Lobito Moser tolerated treatment well and [facility] condition at 1341.   Port flushed and de-accessed per protocol. She is

## 2024-07-18 ENCOUNTER — TELEPHONE (OUTPATIENT)
Age: 64
End: 2024-07-18

## 2024-07-18 NOTE — TELEPHONE ENCOUNTER
7/18/24- Called [facility] to check status of patients application. Per representative patient was approved on 7/12/24. This user requested an approval letter to be faxed to the provider. Per representative this will be faxed and that the pharmacy will make 3 attempts to reach patient to schedule shipment of the Eliquis but patient can call [facility] at [phone] to schedule shipment and they do have translators available.

## 2024-07-18 NOTE — TELEPHONE ENCOUNTER
7/11/24- Met with patient in office and patient signed the BMSPAF application. Application faxed and fax confirmation received.

## 2024-07-19 NOTE — TELEPHONE ENCOUNTER
7/19/24- Called patient using AMN  to inform patient that she is approved for Eliquis with BMSPAF effective now until July 2025. Informed patient to call [facility] to set up shipment of Eliquis when patient is able and make sure to use the [place] option. Patient verbalized understanding and was appreciative of the assistance with no further questions. No further financial assistance needed at this time.

## 2024-07-22 LAB
FUNGUS STAIN: NORMAL
SPECIMEN SOURCE: NORMAL

## 2024-07-23 ENCOUNTER — APPOINTMENT (OUTPATIENT)
Facility: HOSPITAL | Age: 64
End: 2024-07-23
Payer: MEDICAID

## 2024-07-23 RX ORDER — PROCHLORPERAZINE EDISYLATE 5 MG/ML
10 INJECTION INTRAMUSCULAR; INTRAVENOUS
OUTPATIENT
Start: 2024-08-06

## 2024-07-23 RX ORDER — ERIBULIN MESYLATE 0.5 MG/ML
1.4 INJECTION INTRAVENOUS ONCE
OUTPATIENT
Start: 2024-08-06 | End: 2024-07-30

## 2024-07-23 RX ORDER — DIPHENHYDRAMINE HYDROCHLORIDE 50 MG/ML
50 INJECTION INTRAMUSCULAR; INTRAVENOUS
OUTPATIENT
Start: 2024-08-06

## 2024-07-23 RX ORDER — FAMOTIDINE 10 MG/ML
20 INJECTION, SOLUTION INTRAVENOUS
OUTPATIENT
Start: 2024-08-06

## 2024-07-23 RX ORDER — SODIUM CHLORIDE 9 MG/ML
5-250 INJECTION, SOLUTION INTRAVENOUS PRN
OUTPATIENT
Start: 2024-08-06

## 2024-07-23 RX ORDER — SODIUM CHLORIDE 0.9 % (FLUSH) 0.9 %
5-40 SYRINGE (ML) INJECTION PRN
OUTPATIENT
Start: 2024-08-06

## 2024-07-23 RX ORDER — SODIUM CHLORIDE 9 MG/ML
INJECTION, SOLUTION INTRAVENOUS CONTINUOUS
OUTPATIENT
Start: 2024-08-06

## 2024-07-23 RX ORDER — ACETAMINOPHEN 325 MG/1
650 TABLET ORAL
OUTPATIENT
Start: 2024-08-06

## 2024-07-23 RX ORDER — ONDANSETRON 2 MG/ML
8 INJECTION INTRAMUSCULAR; INTRAVENOUS ONCE
OUTPATIENT
Start: 2024-08-06 | End: 2024-07-30

## 2024-07-23 RX ORDER — HEPARIN 100 UNIT/ML
500 SYRINGE INTRAVENOUS PRN
OUTPATIENT
Start: 2024-08-06

## 2024-07-23 RX ORDER — EPINEPHRINE 1 MG/ML
0.3 INJECTION, SOLUTION INTRAMUSCULAR; SUBCUTANEOUS PRN
OUTPATIENT
Start: 2024-08-06

## 2024-07-23 RX ORDER — ALBUTEROL SULFATE 90 UG/1
4 AEROSOL, METERED RESPIRATORY (INHALATION) PRN
OUTPATIENT
Start: 2024-08-06

## 2024-07-23 RX ORDER — ONDANSETRON 2 MG/ML
8 INJECTION INTRAMUSCULAR; INTRAVENOUS
OUTPATIENT
Start: 2024-08-06

## 2024-07-29 LAB
BACTERIA SPEC CULT: NORMAL
SERVICE CMNT-IMP: NORMAL

## 2024-07-30 ENCOUNTER — APPOINTMENT (OUTPATIENT)
Facility: HOSPITAL | Age: 64
End: 2024-07-30
Payer: MEDICAID

## 2024-07-30 ENCOUNTER — OFFICE VISIT (OUTPATIENT)
Age: 64
End: 2024-07-30

## 2024-07-30 ENCOUNTER — HOSPITAL ENCOUNTER (OUTPATIENT)
Facility: HOSPITAL | Age: 64
Setting detail: INFUSION SERIES
Discharge: HOME | End: 2024-07-30

## 2024-07-30 VITALS
OXYGEN SATURATION: 91 % | RESPIRATION RATE: 17 BRPM | BODY MASS INDEX: 23.55 KG/M2 | WEIGHT: 128 LBS | HEART RATE: 83 BPM | DIASTOLIC BLOOD PRESSURE: 73 MMHG | SYSTOLIC BLOOD PRESSURE: 114 MMHG | TEMPERATURE: 98.1 F | HEIGHT: 62 IN

## 2024-07-30 VITALS
TEMPERATURE: 98.1 F | BODY MASS INDEX: 23.55 KG/M2 | SYSTOLIC BLOOD PRESSURE: 114 MMHG | OXYGEN SATURATION: 91 % | HEART RATE: 83 BPM | RESPIRATION RATE: 18 BRPM | DIASTOLIC BLOOD PRESSURE: 73 MMHG | HEIGHT: 62 IN | WEIGHT: 128 LBS

## 2024-07-30 DIAGNOSIS — C50.919 CARCINOMA OF BREAST METASTATIC TO BONE, UNSPECIFIED LATERALITY (HCC): ICD-10-CM

## 2024-07-30 DIAGNOSIS — C50.811 MALIGNANT NEOPLASM OF OVERLAPPING SITES OF RIGHT BREAST IN FEMALE, ESTROGEN RECEPTOR NEGATIVE (HCC): Primary | ICD-10-CM

## 2024-07-30 DIAGNOSIS — Z51.11 ENCOUNTER FOR ANTINEOPLASTIC CHEMOTHERAPY: Primary | ICD-10-CM

## 2024-07-30 DIAGNOSIS — C79.51 CARCINOMA OF BREAST METASTATIC TO BONE, UNSPECIFIED LATERALITY (HCC): ICD-10-CM

## 2024-07-30 DIAGNOSIS — C79.9 METASTASIS FROM BREAST CANCER (HCC): ICD-10-CM

## 2024-07-30 DIAGNOSIS — C50.919 MALIGNANT NEOPLASM OF BREAST IN FEMALE, ESTROGEN RECEPTOR NEGATIVE, UNSPECIFIED LATERALITY, UNSPECIFIED SITE OF BREAST (HCC): ICD-10-CM

## 2024-07-30 DIAGNOSIS — C50.919 METASTASIS FROM BREAST CANCER (HCC): ICD-10-CM

## 2024-07-30 DIAGNOSIS — Z17.1 MALIGNANT NEOPLASM OF OVERLAPPING SITES OF RIGHT BREAST IN FEMALE, ESTROGEN RECEPTOR NEGATIVE (HCC): Primary | ICD-10-CM

## 2024-07-30 DIAGNOSIS — J91.0 MALIGNANT PLEURAL EFFUSION: ICD-10-CM

## 2024-07-30 DIAGNOSIS — Z51.11 ENCOUNTER FOR ANTINEOPLASTIC CHEMOTHERAPY: ICD-10-CM

## 2024-07-30 DIAGNOSIS — Z17.1 MALIGNANT NEOPLASM OF BREAST IN FEMALE, ESTROGEN RECEPTOR NEGATIVE, UNSPECIFIED LATERALITY, UNSPECIFIED SITE OF BREAST (HCC): ICD-10-CM

## 2024-07-30 LAB
ALBUMIN SERPL-MCNC: 2.9 G/DL (ref 3.5–5)
ALBUMIN/GLOB SERPL: 0.6 (ref 1.1–2.2)
ALP SERPL-CCNC: 75 U/L (ref 45–117)
ALT SERPL-CCNC: 9 U/L (ref 12–78)
ANION GAP SERPL CALC-SCNC: 2 MMOL/L (ref 5–15)
AST SERPL-CCNC: 33 U/L (ref 15–37)
BASOPHILS # BLD: 0.1 K/UL (ref 0–0.1)
BASOPHILS NFR BLD: 1 % (ref 0–1)
BILIRUB SERPL-MCNC: 0.4 MG/DL (ref 0.2–1)
BUN SERPL-MCNC: 7 MG/DL (ref 6–20)
BUN/CREAT SERPL: 11 (ref 12–20)
CALCIUM SERPL-MCNC: 8.7 MG/DL (ref 8.5–10.1)
CHLORIDE SERPL-SCNC: 105 MMOL/L (ref 97–108)
CO2 SERPL-SCNC: 29 MMOL/L (ref 21–32)
COMMENT:: NORMAL
CREAT SERPL-MCNC: 0.66 MG/DL (ref 0.55–1.02)
DIFFERENTIAL METHOD BLD: ABNORMAL
EOSINOPHIL # BLD: 0 K/UL (ref 0–0.4)
EOSINOPHIL NFR BLD: 0 % (ref 0–7)
ERYTHROCYTE [DISTWIDTH] IN BLOOD BY AUTOMATED COUNT: 17.2 % (ref 11.5–14.5)
GLOBULIN SER CALC-MCNC: 4.7 G/DL (ref 2–4)
GLUCOSE SERPL-MCNC: 144 MG/DL (ref 65–100)
HCT VFR BLD AUTO: 31 % (ref 35–47)
HGB BLD-MCNC: 9.3 G/DL (ref 11.5–16)
IMM GRANULOCYTES # BLD AUTO: 0 K/UL (ref 0–0.04)
IMM GRANULOCYTES NFR BLD AUTO: 1 % (ref 0–0.5)
LYMPHOCYTES # BLD: 0.9 K/UL (ref 0.8–3.5)
LYMPHOCYTES NFR BLD: 16 % (ref 12–49)
MAGNESIUM SERPL-MCNC: 2.3 MG/DL (ref 1.6–2.4)
MCH RBC QN AUTO: 27.9 PG (ref 26–34)
MCHC RBC AUTO-ENTMCNC: 30 G/DL (ref 30–36.5)
MCV RBC AUTO: 93.1 FL (ref 80–99)
MONOCYTES # BLD: 0.5 K/UL (ref 0–1)
MONOCYTES NFR BLD: 8 % (ref 5–13)
NEUTS SEG # BLD: 4.5 K/UL (ref 1.8–8)
NEUTS SEG NFR BLD: 74 % (ref 32–75)
NRBC # BLD: 0 K/UL (ref 0–0.01)
NRBC BLD-RTO: 0 PER 100 WBC
PLATELET # BLD AUTO: 450 K/UL (ref 150–400)
PMV BLD AUTO: 8.3 FL (ref 8.9–12.9)
POTASSIUM SERPL-SCNC: 3.3 MMOL/L (ref 3.5–5.1)
PROT SERPL-MCNC: 7.6 G/DL (ref 6.4–8.2)
RBC # BLD AUTO: 3.33 M/UL (ref 3.8–5.2)
SODIUM SERPL-SCNC: 136 MMOL/L (ref 136–145)
SPECIMEN HOLD: NORMAL
WBC # BLD AUTO: 6 K/UL (ref 3.6–11)

## 2024-07-30 PROCEDURE — 99215 OFFICE O/P EST HI 40 MIN: CPT | Performed by: NURSE PRACTITIONER

## 2024-07-30 PROCEDURE — 3074F SYST BP LT 130 MM HG: CPT | Performed by: NURSE PRACTITIONER

## 2024-07-30 PROCEDURE — 6370000000 HC RX 637 (ALT 250 FOR IP): Performed by: NURSE PRACTITIONER

## 2024-07-30 PROCEDURE — 80053 COMPREHEN METABOLIC PANEL: CPT

## 2024-07-30 PROCEDURE — 36415 COLL VENOUS BLD VENIPUNCTURE: CPT

## 2024-07-30 PROCEDURE — 36591 DRAW BLOOD OFF VENOUS DEVICE: CPT

## 2024-07-30 PROCEDURE — 85025 COMPLETE CBC W/AUTO DIFF WBC: CPT

## 2024-07-30 PROCEDURE — 3078F DIAST BP <80 MM HG: CPT | Performed by: NURSE PRACTITIONER

## 2024-07-30 PROCEDURE — 83735 ASSAY OF MAGNESIUM: CPT

## 2024-07-30 RX ORDER — POTASSIUM CHLORIDE 750 MG/1
40 TABLET, FILM COATED, EXTENDED RELEASE ORAL ONCE
Status: COMPLETED | OUTPATIENT
Start: 2024-07-30 | End: 2024-07-30

## 2024-07-30 RX ADMIN — POTASSIUM CHLORIDE 40 MEQ: 750 TABLET, FILM COATED, EXTENDED RELEASE ORAL at 12:47

## 2024-07-30 ASSESSMENT — PAIN SCALES - GENERAL: PAINLEVEL_OUTOF10: 0

## 2024-07-30 NOTE — PROGRESS NOTES
[facility] Chemo Progress Note    Date: July 30, 2024        1100: Ms. Lobito Moser Arrived to [facility] for  Eribulin + Margetuximab C3 D1 HELD ambulatory in stable condition.  Assessment was completed and port accessed by Paula Lee RN. Labs drawn and sent for processing. Went to provider appointment with Medical Oncology.  Returned from provider appointment. Labs reviewed. Criteria for treatment was not met. Per MD holding treatment today.    Ms. Lobito Moser's vitals were reviewed.  Patient Vitals for the past 12 hrs:   Temp Pulse Resp BP SpO2   07/30/24 1101 98.1 °F (36.7 °C) 83 17 114/73 91 %         Lab results were obtained and reviewed.  Recent Results (from the past 12 hour(s))   CBC With Auto Differential    Collection Time: 07/30/24 11:10 AM   Result Value Ref Range    WBC 6.0 3.6 - 11.0 K/uL    RBC 3.33 (L) 3.80 - 5.20 M/uL    Hemoglobin 9.3 (L) 11.5 - 16.0 g/dL    Hematocrit 31.0 (L) 35.0 - 47.0 %    MCV 93.1 80.0 - 99.0 FL    MCH 27.9 26.0 - 34.0 PG    MCHC 30.0 30.0 - 36.5 g/dL    RDW 17.2 (H) 11.5 - 14.5 %    Platelets 450 (H) 150 - 400 K/uL    MPV 8.3 (L) 8.9 - 12.9 FL    Nucleated RBCs 0.0 0  WBC    nRBC 0.00 0.00 - 0.01 K/uL    Neutrophils % 74 32 - 75 %    Lymphocytes % 16 12 - 49 %    Monocytes % 8 5 - 13 %    Eosinophils % 0 0 - 7 %    Basophils % 1 0 - 1 %    Immature Granulocytes % 1 (H) 0.0 - 0.5 %    Neutrophils Absolute 4.5 1.8 - 8.0 K/UL    Lymphocytes Absolute 0.9 0.8 - 3.5 K/UL    Monocytes Absolute 0.5 0.0 - 1.0 K/UL    Eosinophils Absolute 0.0 0.0 - 0.4 K/UL    Basophils Absolute 0.1 0.0 - 0.1 K/UL    Immature Granulocytes Absolute 0.0 0.00 - 0.04 K/UL    Differential Type AUTOMATED     Comprehensive metabolic panel    Collection Time: 07/30/24 11:10 AM   Result Value Ref Range    Sodium 136 136 - 145 mmol/L    Potassium 3.3 (L) 3.5 - 5.1 mmol/L    Chloride 105 97 - 108 mmol/L    CO2 29 21 - 32 mmol/L    Anion Gap 2 (L) 5 - 15 mmol/L    Glucose 144 (H) 65 - 100 mg/dL    BUN 7 6 -

## 2024-07-30 NOTE — PROGRESS NOTES
Debra RIVERA Lobitostacey Moser is a 64 y.o. female follow up for  breast cancer.    1. Have you been to the ER, urgent care clinic since your last visit?  Hospitalized since your last visit?no    2. Have you seen or consulted any other health care providers outside of the [facility] since your last visit?  Include any pap smears or colon screening. No     7/30/2024  11:01 AM   Vitals    SYSTOLIC 114    DIASTOLIC 73    Site    Position    Pulse 83    Temp 98.1 °F (36.7 °C)    Respirations 17    SpO2 91 %    Weight - Scale 128 lb    Height 5' 2.008\"    Body Mass Index 23.41 kg/m2    Pain Score    Pain Loc    Pain Level 0        
[de-identified] : Injection: Right knee joint.\par Indication: Osteoarthritis.\par \par A discussion was had with the patient regarding this procedure and all questions were answered. All risks, benefits and alternatives were discussed. These included but were not limited to bleeding, infection, and allergic reaction. Alcohol was used to clean the skin, and betadine was used to sterilize and prep the area in the supero-lateral aspect of the right knee. Ethyl chloride spray was then used as a topical anesthetic. A 21-gauge needle was used to inject 2 cc of Euflexxa into the knee. A sterile bandage was then applied. The patient tolerated the procedure well and there were no complications. \par \par 
zofran, compazine     2. Bone Metastases: Discussed zometa 4 mg q 3 months IV:    We discussed side effects such as ONJ and the need to avoid invasive dental procedures while on these medications, renal damage, and hypocalcemia.  Last given 6/18/24.     3. Emotional well being:  She has excellent support and is coping well with her disease    4. DM2:  was on metformin, no longer taking    5. Right Shoulder pain:  on celebrex; she has started PT.    6.Cough/RLL pneumonia:                                                                                                                                                                                        She underwent bronchoscopy 11/16/23 with cultures negative at this point. Restarted 40 mg prednisone daily and planned to repeat high res CT which was not scheduled. Decreased to 30mg prednisone 12/12/23. Cough improved per patient. Decreased to 20mg daily 1/2/24. decreased to 10mg daily, 1/23/24. Decreased to 10 mg every other day 2/13/24. Discontinued 3/12/24.     She then continued with mild cough that was likely due to cancer progression, improved with tessalon 100mg TID prn. She reports she had improvement with levofloxacin but after completion she had worsening.     Right pleural effusion.  Thoracentesis 6/14/24 with cytology + for metastatic breast cancer, ER/[place] -, HER2 positive by IHC.     7/1/24 right thoracentesis:  250 mL removed    7. HTN: stopped amlodipine, normal bp today    8. AST elevation:  mild, due to cancer and therapy    9. Hypokalemia:  will replete in opic prn    10. L posterior parietal CVA:  referred to neurology, sees them in oct    11. Nausea: PRN compazine and ondansetron    12. Mouth sores: Viscous lidocaine ordered previously, none present today.     13. Right upper arm edema: venous doppler negative 6/4/24    14. Acute RUL PE:  7/2/24, on eliquis, coordinating     15. Constipation:  discussed colace    16. Weight monitoring:  loss of

## 2024-07-31 ENCOUNTER — HOSPITAL ENCOUNTER (OUTPATIENT)
Facility: HOSPITAL | Age: 64
Discharge: HOME | End: 2024-08-03

## 2024-07-31 DIAGNOSIS — Z17.1 MALIGNANT NEOPLASM OF OVERLAPPING SITES OF RIGHT BREAST IN FEMALE, ESTROGEN RECEPTOR NEGATIVE (HCC): ICD-10-CM

## 2024-07-31 DIAGNOSIS — C50.811 MALIGNANT NEOPLASM OF OVERLAPPING SITES OF RIGHT BREAST IN FEMALE, ESTROGEN RECEPTOR NEGATIVE (HCC): ICD-10-CM

## 2024-07-31 DIAGNOSIS — J91.0 MALIGNANT PLEURAL EFFUSION: ICD-10-CM

## 2024-07-31 PROCEDURE — 3430000000 HC RX DIAGNOSTIC RADIOPHARMACEUTICAL: Performed by: NURSE PRACTITIONER

## 2024-07-31 PROCEDURE — 71260 CT THORAX DX C+: CPT

## 2024-07-31 PROCEDURE — A9503 TC99M MEDRONATE: HCPCS | Performed by: NURSE PRACTITIONER

## 2024-07-31 PROCEDURE — 78306 BONE IMAGING WHOLE BODY: CPT | Performed by: NURSE PRACTITIONER

## 2024-07-31 PROCEDURE — 6360000004 HC RX CONTRAST MEDICATION: Performed by: NURSE PRACTITIONER

## 2024-07-31 RX ORDER — TC 99M MEDRONATE 20 MG/10ML
25 INJECTION, POWDER, LYOPHILIZED, FOR SOLUTION INTRAVENOUS
Status: COMPLETED | OUTPATIENT
Start: 2024-07-31 | End: 2024-07-31

## 2024-07-31 RX ADMIN — IOPAMIDOL 100 ML: 755 INJECTION, SOLUTION INTRAVENOUS at 08:35

## 2024-07-31 RX ADMIN — TC 99M MEDRONATE 25 MILLICURIE: 20 INJECTION, POWDER, LYOPHILIZED, FOR SOLUTION INTRAVENOUS at 08:29

## 2024-08-06 ENCOUNTER — HOSPITAL ENCOUNTER (OUTPATIENT)
Facility: HOSPITAL | Age: 64
Setting detail: INFUSION SERIES
Discharge: HOME | End: 2024-08-06

## 2024-08-06 ENCOUNTER — APPOINTMENT (OUTPATIENT)
Facility: HOSPITAL | Age: 64
End: 2024-08-06
Payer: MEDICAID

## 2024-08-06 ENCOUNTER — OFFICE VISIT (OUTPATIENT)
Age: 64
End: 2024-08-06

## 2024-08-06 VITALS
WEIGHT: 125 LBS | OXYGEN SATURATION: 93 % | BODY MASS INDEX: 23 KG/M2 | RESPIRATION RATE: 18 BRPM | HEART RATE: 83 BPM | SYSTOLIC BLOOD PRESSURE: 118 MMHG | TEMPERATURE: 97.7 F | HEIGHT: 62 IN | DIASTOLIC BLOOD PRESSURE: 57 MMHG

## 2024-08-06 VITALS
DIASTOLIC BLOOD PRESSURE: 57 MMHG | RESPIRATION RATE: 18 BRPM | OXYGEN SATURATION: 93 % | WEIGHT: 125 LBS | SYSTOLIC BLOOD PRESSURE: 118 MMHG | BODY MASS INDEX: 23 KG/M2 | HEART RATE: 83 BPM | HEIGHT: 62 IN | TEMPERATURE: 97.7 F

## 2024-08-06 DIAGNOSIS — C50.919 MALIGNANT NEOPLASM OF BREAST IN FEMALE, ESTROGEN RECEPTOR NEGATIVE, UNSPECIFIED LATERALITY, UNSPECIFIED SITE OF BREAST (HCC): ICD-10-CM

## 2024-08-06 DIAGNOSIS — C50.919 METASTASIS FROM BREAST CANCER (HCC): Primary | ICD-10-CM

## 2024-08-06 DIAGNOSIS — Z17.1 MALIGNANT NEOPLASM OF OVERLAPPING SITES OF RIGHT BREAST IN FEMALE, ESTROGEN RECEPTOR NEGATIVE (HCC): Primary | ICD-10-CM

## 2024-08-06 DIAGNOSIS — Z51.11 ENCOUNTER FOR ANTINEOPLASTIC CHEMOTHERAPY: ICD-10-CM

## 2024-08-06 DIAGNOSIS — Z17.1 MALIGNANT NEOPLASM OF BREAST IN FEMALE, ESTROGEN RECEPTOR NEGATIVE, UNSPECIFIED LATERALITY, UNSPECIFIED SITE OF BREAST (HCC): ICD-10-CM

## 2024-08-06 DIAGNOSIS — C79.51 CARCINOMA OF BREAST METASTATIC TO BONE, UNSPECIFIED LATERALITY (HCC): ICD-10-CM

## 2024-08-06 DIAGNOSIS — C79.9 METASTASIS FROM BREAST CANCER (HCC): Primary | ICD-10-CM

## 2024-08-06 DIAGNOSIS — C50.811 MALIGNANT NEOPLASM OF OVERLAPPING SITES OF RIGHT BREAST IN FEMALE, ESTROGEN RECEPTOR NEGATIVE (HCC): Primary | ICD-10-CM

## 2024-08-06 DIAGNOSIS — C50.919 CARCINOMA OF BREAST METASTATIC TO BONE, UNSPECIFIED LATERALITY (HCC): ICD-10-CM

## 2024-08-06 LAB
ALBUMIN SERPL-MCNC: 3 G/DL (ref 3.5–5)
ALBUMIN/GLOB SERPL: 0.6 (ref 1.1–2.2)
ALP SERPL-CCNC: 72 U/L (ref 45–117)
ALT SERPL-CCNC: 7 U/L (ref 12–78)
ANION GAP SERPL CALC-SCNC: 1 MMOL/L (ref 5–15)
AST SERPL-CCNC: 36 U/L (ref 15–37)
BASOPHILS # BLD: 0.1 K/UL (ref 0–0.1)
BASOPHILS NFR BLD: 1 % (ref 0–1)
BILIRUB SERPL-MCNC: 0.6 MG/DL (ref 0.2–1)
BUN SERPL-MCNC: 13 MG/DL (ref 6–20)
BUN/CREAT SERPL: 18 (ref 12–20)
CALCIUM SERPL-MCNC: 9 MG/DL (ref 8.5–10.1)
CHLORIDE SERPL-SCNC: 104 MMOL/L (ref 97–108)
CO2 SERPL-SCNC: 31 MMOL/L (ref 21–32)
CREAT SERPL-MCNC: 0.72 MG/DL (ref 0.55–1.02)
DIFFERENTIAL METHOD BLD: ABNORMAL
EOSINOPHIL # BLD: 0 K/UL (ref 0–0.4)
EOSINOPHIL NFR BLD: 0 % (ref 0–7)
ERYTHROCYTE [DISTWIDTH] IN BLOOD BY AUTOMATED COUNT: 17.2 % (ref 11.5–14.5)
GLOBULIN SER CALC-MCNC: 5.1 G/DL (ref 2–4)
GLUCOSE SERPL-MCNC: 167 MG/DL (ref 65–100)
HCT VFR BLD AUTO: 31.4 % (ref 35–47)
HGB BLD-MCNC: 9.6 G/DL (ref 11.5–16)
IMM GRANULOCYTES # BLD AUTO: 0 K/UL (ref 0–0.04)
IMM GRANULOCYTES NFR BLD AUTO: 0 % (ref 0–0.5)
LYMPHOCYTES # BLD: 1 K/UL (ref 0.8–3.5)
LYMPHOCYTES NFR BLD: 14 % (ref 12–49)
MAGNESIUM SERPL-MCNC: 2.4 MG/DL (ref 1.6–2.4)
MCH RBC QN AUTO: 27.4 PG (ref 26–34)
MCHC RBC AUTO-ENTMCNC: 30.6 G/DL (ref 30–36.5)
MCV RBC AUTO: 89.7 FL (ref 80–99)
MONOCYTES # BLD: 0.3 K/UL (ref 0–1)
MONOCYTES NFR BLD: 4 % (ref 5–13)
NEUTS SEG # BLD: 5.7 K/UL (ref 1.8–8)
NEUTS SEG NFR BLD: 81 % (ref 32–75)
NRBC # BLD: 0 K/UL (ref 0–0.01)
NRBC BLD-RTO: 0 PER 100 WBC
PLATELET # BLD AUTO: 411 K/UL (ref 150–400)
PMV BLD AUTO: 8.4 FL (ref 8.9–12.9)
POTASSIUM SERPL-SCNC: 3.6 MMOL/L (ref 3.5–5.1)
PROT SERPL-MCNC: 8.1 G/DL (ref 6.4–8.2)
RBC # BLD AUTO: 3.5 M/UL (ref 3.8–5.2)
SODIUM SERPL-SCNC: 136 MMOL/L (ref 136–145)
WBC # BLD AUTO: 7.1 K/UL (ref 3.6–11)

## 2024-08-06 PROCEDURE — 96411 CHEMO IV PUSH ADDL DRUG: CPT

## 2024-08-06 PROCEDURE — 3074F SYST BP LT 130 MM HG: CPT | Performed by: INTERNAL MEDICINE

## 2024-08-06 PROCEDURE — 3078F DIAST BP <80 MM HG: CPT | Performed by: INTERNAL MEDICINE

## 2024-08-06 PROCEDURE — 80053 COMPREHEN METABOLIC PANEL: CPT

## 2024-08-06 PROCEDURE — 96375 TX/PRO/DX INJ NEW DRUG ADDON: CPT

## 2024-08-06 PROCEDURE — 6360000002 HC RX W HCPCS: Performed by: INTERNAL MEDICINE

## 2024-08-06 PROCEDURE — 99215 OFFICE O/P EST HI 40 MIN: CPT | Performed by: INTERNAL MEDICINE

## 2024-08-06 PROCEDURE — 2580000003 HC RX 258: Performed by: INTERNAL MEDICINE

## 2024-08-06 PROCEDURE — 96413 CHEMO IV INFUSION 1 HR: CPT

## 2024-08-06 PROCEDURE — 85025 COMPLETE CBC W/AUTO DIFF WBC: CPT

## 2024-08-06 PROCEDURE — 83735 ASSAY OF MAGNESIUM: CPT

## 2024-08-06 RX ORDER — ONDANSETRON 2 MG/ML
8 INJECTION INTRAMUSCULAR; INTRAVENOUS ONCE
Status: COMPLETED | OUTPATIENT
Start: 2024-08-06 | End: 2024-08-06

## 2024-08-06 RX ORDER — SODIUM CHLORIDE 9 MG/ML
5-250 INJECTION, SOLUTION INTRAVENOUS PRN
Status: DISCONTINUED | OUTPATIENT
Start: 2024-08-06 | End: 2024-08-07 | Stop reason: HOSPADM

## 2024-08-06 RX ORDER — DEXAMETHASONE 1 MG
1 TABLET ORAL
Qty: 30 TABLET | Refills: 5 | Status: SHIPPED | OUTPATIENT
Start: 2024-08-06

## 2024-08-06 RX ORDER — DEXAMETHASONE 0.5 MG/1
0.5 TABLET ORAL EVERY 6 HOURS
Qty: 40 TABLET | Refills: 0 | Status: SHIPPED | OUTPATIENT
Start: 2024-08-06 | End: 2024-08-06

## 2024-08-06 RX ORDER — ERIBULIN MESYLATE 0.5 MG/ML
1.4 INJECTION INTRAVENOUS ONCE
Status: COMPLETED | OUTPATIENT
Start: 2024-08-06 | End: 2024-08-06

## 2024-08-06 RX ADMIN — HYDROCORTISONE SODIUM SUCCINATE 100 MG: 100 INJECTION, POWDER, FOR SOLUTION INTRAMUSCULAR; INTRAVENOUS at 12:24

## 2024-08-06 RX ADMIN — MARGETUXIMAB-CMKB 912.5 MG: 25 INJECTION, SOLUTION, CONCENTRATE INTRAVENOUS at 13:19

## 2024-08-06 RX ADMIN — ONDANSETRON 8 MG: 2 INJECTION INTRAMUSCULAR; INTRAVENOUS at 12:23

## 2024-08-06 RX ADMIN — ERIBULIN MESYLATE 2.3 MG: 0.5 INJECTION INTRAVENOUS at 13:13

## 2024-08-06 ASSESSMENT — PAIN SCALES - GENERAL: PAINLEVEL_OUTOF10: 0

## 2024-08-06 NOTE — PROGRESS NOTES
[facility]  [address], [address] [facility] [address]  W: [phone]  F: [phone]      Reason for Visit:   Debra Moser is a 64 y.o. female who has transferred to me from [place].    Treatment History:   2016:  Original right stage 3 breast cancer, s/p mastectomy with 5 cm IDC, 3/19 LN, [place] negative, [facility], HER 2 +, treated with chemotherapy (unknown) and XRT (in [facility])  9/2018:  recurrent breast cancer to her neck, tx with docetaxel q 3 weeks x 9, completed 4/12/2019 (PD) ([facility])  6/25/19 L ax LN FNA:  poorly differentiated carcinoma, c/w breast cancer, [place] negative, [place] negative, HER 2 positive at IHC 3+  [facility] testing:  HER 2 overexpression  7/26/19 Trastuzumab/pertuzumab/paclitaxel until 7/2021 after 32 cycles (PD) ([place], [place])  XRT to right neck 1/2021  T-DM1 8/10/21- T-Dxd  T-Dxd 7/2022- 9/26/23   Hold 9/26/23 due to gr 1 ILD - discontinued due to continued concern for chronic ILD on CT  Right shoulder dislocation and infection 11/17/22  Zometa 9/5/2023 - current  Bronch, BAL, malignant cells present, compatible with carcinoma, not able to do receptors  Trastuzumab 11/21/23 - 1/23/24 (PD)  Tucatinib 11/13/23 - 2/13/24 (PD)  Capecitabine 11/23/23 - 2/13/24 (PD)  T-Dxd 2/20/24- 6/4/24 (PD)  Margetuximab + eribulin 6/18/24- current  Right thoracentesis 6/14/24 1500 mL removed - + for metastatic carcinoma, [place] -, HER2 + by IHC    History of Present Illness:   Recently moved to [place] to live with daughter.    Interval Hx: Patient presents today for follow up and treatment.  Reports gr 1 cough, and rash to neck and chest    Entire visit was done with Cherelle from [facility] [facility].    Was admitted from 7/2/24-7/4/24 for PE and PNA    Past Medical History:   Diagnosis Date    DM type 2 causing vascular disease (HCC)     Hx of completed stroke     L posterior parietal    HX: breast cancer     Hypertension     Metastasis from breast

## 2024-08-06 NOTE — PROGRESS NOTES
Glucose 167 (H) 65 - 100 mg/dL    BUN 13 6 - 20 MG/DL    Creatinine 0.72 0.55 - 1.02 MG/DL    BUN/Creatinine Ratio 18 12 - 20      Est, Glom Filt Rate >90 >60 ml/min/1.73m2    Calcium 9.0 8.5 - 10.1 MG/DL    Total Bilirubin 0.6 0.2 - 1.0 MG/DL    ALT 7 (L) 12 - 78 U/L    AST 36 15 - 37 U/L    Alk Phosphatase 72 45 - 117 U/L    Total Protein 8.1 6.4 - 8.2 g/dL    Albumin 3.0 (L) 3.5 - 5.0 g/dL    Globulin 5.1 (H) 2.0 - 4.0 g/dL    Albumin/Globulin Ratio 0.6 (L) 1.1 - 2.2     Magnesium    Collection Time: 08/06/24 11:04 AM   Result Value Ref Range    Magnesium 2.4 1.6 - 2.4 mg/dL       Medications:  Medications Administered         eriBULin mesylate (HALAVEN) injection SOLN 2.3 mg Admin Date  08/06/2024 Action  Given Dose  2.3 mg Rate   Route  IntraVENous Administered By  Sadaf Islas RN        hydrocortisone sodium succinate PF (SOLU-CORTEF) injection 100 mg Admin Date  08/06/2024 Action  Given Dose  100 mg Rate   Route  IntraVENous Administered By  Roshan, Sadaf E., RN        [facility] (M[facility]ENZA) 912.5 mg in sodium chloride 0.9 % 250 mL chemo IVPB Admin Date  08/06/2024 Action  New Bag Dose  912.5 mg Rate  623 mL/hr Route  IntraVENous Administered By  Sadaf Islas RN        ondansetron (ZOFRAN) injection 8 mg Admin Date  08/06/2024 Action  Given Dose  8 mg Rate   Route  IntraVENous Administered By  Sadaf Islas, RN            Two nurses verified prior to administering: Drug name, drug dose, infusion volume or drug volume when prepared in a syringe, rate of administration, route of administration,  expiration dates and/or times, appearance and physical integrity of the drugs, rate set on infusion pump, when used sequencing of drug administration.       Ms. Lobito Moser tolerated treatment well and [facility].   Port de-accessed, flushed per protocol. Patient is aware of future appointments.    Future Appointments   Date  Time Provider Department Center   8/13/2024 11:30 AM SS CHEMO CHAIR 4 [facility]   8/27/2024 11:00 AM SS CHEMO CHAIR 3 [facility]   8/27/2024 11:30 AM Doyle Underwood MD [facility]   9/4/2024 11:30 AM SS CHEMO CHAIR 4 [facility]   9/17/2024 11:30 AM SS CHEMO CHAIR 4 [facility]   9/24/2024 10:30 AM SS CHEMO CHAIR 2 [facility]   10/3/2024  2:00 PM Abigail Desir MD [facility]   10/8/2024 11:00 AM SS CHEMO CHAIR 3 [facility]   10/15/2024 11:30 AM SS CHEMO CHAIR 4 [facility]   10/29/2024 11:00 AM SS CHEMO CHAIR 3 [facility]   11/5/2024 10:30 AM SS CHEMO CHAIR 2 [facility]   11/19/2024 10:30 AM SS CHEMO CHAIR 2 [facility]   11/26/2024 10:30 AM SS CHEMO CHAIR 2 [facility]   12/10/2024 10:30 AM SS CHEMO CHAIR 2 [facility]   12/17/2024 10:30 AM SS CHEMO CHAIR 2 [facility]        Sadaf Islas RN  August 6, 2024

## 2024-08-07 RX ORDER — PROCHLORPERAZINE EDISYLATE 5 MG/ML
10 INJECTION INTRAMUSCULAR; INTRAVENOUS
Status: CANCELLED | OUTPATIENT
Start: 2024-08-13

## 2024-08-07 RX ORDER — DIPHENHYDRAMINE HYDROCHLORIDE 50 MG/ML
50 INJECTION INTRAMUSCULAR; INTRAVENOUS
Status: CANCELLED | OUTPATIENT
Start: 2024-08-13

## 2024-08-07 RX ORDER — SODIUM CHLORIDE 9 MG/ML
5-250 INJECTION, SOLUTION INTRAVENOUS PRN
Status: CANCELLED | OUTPATIENT
Start: 2024-08-13

## 2024-08-07 RX ORDER — FAMOTIDINE 10 MG/ML
20 INJECTION, SOLUTION INTRAVENOUS
Status: CANCELLED | OUTPATIENT
Start: 2024-08-13

## 2024-08-07 RX ORDER — ACETAMINOPHEN 325 MG/1
650 TABLET ORAL
Status: CANCELLED | OUTPATIENT
Start: 2024-08-13

## 2024-08-07 RX ORDER — ALBUTEROL SULFATE 90 UG/1
4 AEROSOL, METERED RESPIRATORY (INHALATION) PRN
Status: CANCELLED | OUTPATIENT
Start: 2024-08-13

## 2024-08-07 RX ORDER — ONDANSETRON 2 MG/ML
8 INJECTION INTRAMUSCULAR; INTRAVENOUS
Status: CANCELLED | OUTPATIENT
Start: 2024-08-13

## 2024-08-07 RX ORDER — SODIUM CHLORIDE 9 MG/ML
INJECTION, SOLUTION INTRAVENOUS CONTINUOUS
Status: CANCELLED | OUTPATIENT
Start: 2024-08-13

## 2024-08-07 RX ORDER — HEPARIN 100 UNIT/ML
500 SYRINGE INTRAVENOUS PRN
Status: CANCELLED | OUTPATIENT
Start: 2024-08-13

## 2024-08-07 RX ORDER — EPINEPHRINE 1 MG/ML
0.3 INJECTION, SOLUTION INTRAMUSCULAR; SUBCUTANEOUS PRN
Status: CANCELLED | OUTPATIENT
Start: 2024-08-13

## 2024-08-12 LAB
BACTERIA SPEC CULT: NORMAL
SERVICE CMNT-IMP: NORMAL

## 2024-08-13 ENCOUNTER — HOSPITAL ENCOUNTER (OUTPATIENT)
Facility: HOSPITAL | Age: 64
Setting detail: INFUSION SERIES
Discharge: HOME | End: 2024-08-13

## 2024-08-13 ENCOUNTER — APPOINTMENT (OUTPATIENT)
Facility: HOSPITAL | Age: 64
End: 2024-08-13
Payer: MEDICAID

## 2024-08-13 VITALS
OXYGEN SATURATION: 94 % | RESPIRATION RATE: 17 BRPM | WEIGHT: 128.7 LBS | HEIGHT: 62 IN | TEMPERATURE: 98 F | DIASTOLIC BLOOD PRESSURE: 59 MMHG | BODY MASS INDEX: 23.68 KG/M2 | HEART RATE: 80 BPM | SYSTOLIC BLOOD PRESSURE: 123 MMHG

## 2024-08-13 DIAGNOSIS — C50.919 CARCINOMA OF BREAST METASTATIC TO BONE, UNSPECIFIED LATERALITY (HCC): ICD-10-CM

## 2024-08-13 DIAGNOSIS — C50.919 METASTASIS FROM BREAST CANCER (HCC): ICD-10-CM

## 2024-08-13 DIAGNOSIS — C79.9 METASTASIS FROM BREAST CANCER (HCC): ICD-10-CM

## 2024-08-13 DIAGNOSIS — Z51.11 ENCOUNTER FOR ANTINEOPLASTIC CHEMOTHERAPY: Primary | ICD-10-CM

## 2024-08-13 DIAGNOSIS — C79.51 CARCINOMA OF BREAST METASTATIC TO BONE, UNSPECIFIED LATERALITY (HCC): ICD-10-CM

## 2024-08-13 DIAGNOSIS — C50.919 MALIGNANT NEOPLASM OF BREAST IN FEMALE, ESTROGEN RECEPTOR NEGATIVE, UNSPECIFIED LATERALITY, UNSPECIFIED SITE OF BREAST (HCC): ICD-10-CM

## 2024-08-13 DIAGNOSIS — Z17.1 MALIGNANT NEOPLASM OF BREAST IN FEMALE, ESTROGEN RECEPTOR NEGATIVE, UNSPECIFIED LATERALITY, UNSPECIFIED SITE OF BREAST (HCC): ICD-10-CM

## 2024-08-13 LAB
ALBUMIN SERPL-MCNC: 3.1 G/DL (ref 3.5–5)
ALBUMIN/GLOB SERPL: 0.7 (ref 1.1–2.2)
ALP SERPL-CCNC: 59 U/L (ref 45–117)
ALT SERPL-CCNC: 14 U/L (ref 12–78)
ANION GAP SERPL CALC-SCNC: 3 MMOL/L (ref 5–15)
AST SERPL-CCNC: 33 U/L (ref 15–37)
BASOPHILS # BLD: 0 K/UL (ref 0–0.1)
BASOPHILS NFR BLD: 1 % (ref 0–1)
BILIRUB SERPL-MCNC: 0.3 MG/DL (ref 0.2–1)
BUN SERPL-MCNC: 13 MG/DL (ref 6–20)
BUN/CREAT SERPL: 20 (ref 12–20)
CALCIUM SERPL-MCNC: 9.2 MG/DL (ref 8.5–10.1)
CHLORIDE SERPL-SCNC: 104 MMOL/L (ref 97–108)
CO2 SERPL-SCNC: 30 MMOL/L (ref 21–32)
CREAT SERPL-MCNC: 0.65 MG/DL (ref 0.55–1.02)
DIFFERENTIAL METHOD BLD: ABNORMAL
EOSINOPHIL # BLD: 0 K/UL (ref 0–0.4)
EOSINOPHIL NFR BLD: 0 % (ref 0–7)
ERYTHROCYTE [DISTWIDTH] IN BLOOD BY AUTOMATED COUNT: 16.9 % (ref 11.5–14.5)
GLOBULIN SER CALC-MCNC: 4.6 G/DL (ref 2–4)
GLUCOSE SERPL-MCNC: 196 MG/DL (ref 65–100)
HCT VFR BLD AUTO: 29 % (ref 35–47)
HGB BLD-MCNC: 9.1 G/DL (ref 11.5–16)
IMM GRANULOCYTES # BLD AUTO: 0 K/UL
IMM GRANULOCYTES NFR BLD AUTO: 0 %
LYMPHOCYTES # BLD: 0.9 K/UL (ref 0.8–3.5)
LYMPHOCYTES NFR BLD: 24 % (ref 12–49)
MAGNESIUM SERPL-MCNC: 1.9 MG/DL (ref 1.6–2.4)
MCH RBC QN AUTO: 27.8 PG (ref 26–34)
MCHC RBC AUTO-ENTMCNC: 31.4 G/DL (ref 30–36.5)
MCV RBC AUTO: 88.7 FL (ref 80–99)
MONOCYTES # BLD: 0.3 K/UL (ref 0–1)
MONOCYTES NFR BLD: 7 % (ref 5–13)
NEUTS BAND NFR BLD MANUAL: 2 % (ref 0–6)
NEUTS SEG # BLD: 2.5 K/UL (ref 1.8–8)
NEUTS SEG NFR BLD: 66 % (ref 32–75)
NRBC # BLD: 0 K/UL (ref 0–0.01)
NRBC BLD-RTO: 0 PER 100 WBC
PLATELET # BLD AUTO: 329 K/UL (ref 150–400)
PMV BLD AUTO: 8.9 FL (ref 8.9–12.9)
POTASSIUM SERPL-SCNC: 3.5 MMOL/L (ref 3.5–5.1)
PROT SERPL-MCNC: 7.7 G/DL (ref 6.4–8.2)
RBC # BLD AUTO: 3.27 M/UL (ref 3.8–5.2)
RBC MORPH BLD: ABNORMAL
SODIUM SERPL-SCNC: 137 MMOL/L (ref 136–145)
WBC # BLD AUTO: 3.7 K/UL (ref 3.6–11)

## 2024-08-13 PROCEDURE — 96375 TX/PRO/DX INJ NEW DRUG ADDON: CPT

## 2024-08-13 PROCEDURE — 6360000002 HC RX W HCPCS: Performed by: INTERNAL MEDICINE

## 2024-08-13 PROCEDURE — 2580000003 HC RX 258: Performed by: INTERNAL MEDICINE

## 2024-08-13 PROCEDURE — 85025 COMPLETE CBC W/AUTO DIFF WBC: CPT

## 2024-08-13 PROCEDURE — 80053 COMPREHEN METABOLIC PANEL: CPT

## 2024-08-13 PROCEDURE — 83735 ASSAY OF MAGNESIUM: CPT

## 2024-08-13 PROCEDURE — 96409 CHEMO IV PUSH SNGL DRUG: CPT

## 2024-08-13 RX ORDER — SODIUM CHLORIDE 9 MG/ML
5-250 INJECTION, SOLUTION INTRAVENOUS PRN
Status: DISCONTINUED | OUTPATIENT
Start: 2024-08-13 | End: 2024-08-14 | Stop reason: HOSPADM

## 2024-08-13 RX ORDER — ERIBULIN MESYLATE 0.5 MG/ML
1.4 INJECTION INTRAVENOUS ONCE
Status: COMPLETED | OUTPATIENT
Start: 2024-08-13 | End: 2024-08-13

## 2024-08-13 RX ORDER — SODIUM CHLORIDE 0.9 % (FLUSH) 0.9 %
5-40 SYRINGE (ML) INJECTION PRN
Status: DISCONTINUED | OUTPATIENT
Start: 2024-08-13 | End: 2024-08-14 | Stop reason: HOSPADM

## 2024-08-13 RX ORDER — ONDANSETRON 2 MG/ML
8 INJECTION INTRAMUSCULAR; INTRAVENOUS ONCE
Status: COMPLETED | OUTPATIENT
Start: 2024-08-13 | End: 2024-08-13

## 2024-08-13 RX ADMIN — ERIBULIN MESYLATE 2.3 MG: 0.5 INJECTION INTRAVENOUS at 13:21

## 2024-08-13 RX ADMIN — SODIUM CHLORIDE 200 ML/HR: 9 INJECTION, SOLUTION INTRAVENOUS at 12:15

## 2024-08-13 RX ADMIN — SODIUM CHLORIDE, PRESERVATIVE FREE 20 ML: 5 INJECTION INTRAVENOUS at 13:29

## 2024-08-13 RX ADMIN — ONDANSETRON 8 MG: 2 INJECTION INTRAMUSCULAR; INTRAVENOUS at 12:15

## 2024-08-13 ASSESSMENT — PAIN SCALES - GENERAL: PAINLEVEL_OUTOF10: 0

## 2024-08-13 NOTE — PROGRESS NOTES
[facility] Chemo Progress Note    Date: August 13, 2024        1110: Ms. Lobito Moser Arrived to [facility] for  Eribulin C3 D8 ambulatory in stable condition.  Assessment was completed and port accessed by Paula Lee RN. Labs drawn and sent for processing. Labs reviewed. Criteria for treatment was not met. Patient's magnesium was 1.9 and was outside of parameters. RN notified Evelyn Ramírez RN. Rochelle Florian NP gave the okay to proceed with treatment today.    Ms. Lobito Moser's vitals were reviewed.  Patient Vitals for the past 12 hrs:   Temp Pulse Resp BP SpO2   08/13/24 1325 -- 80 17 (!) 123/59 --   08/13/24 1108 98 °F (36.7 °C) 81 18 132/65 94 %         Lab results were obtained and reviewed.  Recent Results (from the past 12 hour(s))   Comprehensive Metabolic Panel    Collection Time: 08/13/24 11:12 AM   Result Value Ref Range    Sodium 137 136 - 145 mmol/L    Potassium 3.5 3.5 - 5.1 mmol/L    Chloride 104 97 - 108 mmol/L    CO2 30 21 - 32 mmol/L    Anion Gap 3 (L) 5 - 15 mmol/L    Glucose 196 (H) 65 - 100 mg/dL    BUN 13 6 - 20 MG/DL    Creatinine 0.65 0.55 - 1.02 MG/DL    BUN/Creatinine Ratio 20 12 - 20      Est, Glom Filt Rate >90 >60 ml/min/1.73m2    Calcium 9.2 8.5 - 10.1 MG/DL    Total Bilirubin 0.3 0.2 - 1.0 MG/DL    ALT 14 12 - 78 U/L    AST 33 15 - 37 U/L    Alk Phosphatase 59 45 - 117 U/L    Total Protein 7.7 6.4 - 8.2 g/dL    Albumin 3.1 (L) 3.5 - 5.0 g/dL    Globulin 4.6 (H) 2.0 - 4.0 g/dL    Albumin/Globulin Ratio 0.7 (L) 1.1 - 2.2     Magnesium    Collection Time: 08/13/24 11:12 AM   Result Value Ref Range    Magnesium 1.9 1.6 - 2.4 mg/dL   CBC With Auto Differential    Collection Time: 08/13/24 11:12 AM   Result Value Ref Range    WBC 3.7 3.6 - 11.0 K/uL    RBC 3.27 (L) 3.80 - 5.20 M/uL    Hemoglobin 9.1 (L) 11.5 - 16.0 g/dL    Hematocrit 29.0 (L) 35.0 - 47.0 %    MCV 88.7 80.0 - 99.0 FL    MCH 27.8 26.0 - 34.0 PG    MCHC 31.4 30.0 - 36.5 g/dL    RDW 16.9 (H) 11.5 - 14.5 %    Platelets 329 150 - 400  K/uL    MPV 8.9 8.9 - 12.9 FL    Nucleated RBCs 0.0 0  WBC    nRBC 0.00 0.00 - 0.01 K/uL    Neutrophils % 66 32 - 75 %    Band Neutrophils 2 0 - 6 %    Lymphocytes % 24 12 - 49 %    Monocytes % 7 5 - 13 %    Eosinophils % 0 0 - 7 %    Basophils % 1 0 - 1 %    Immature Granulocytes % 0 %    Neutrophils Absolute 2.5 1.8 - 8.0 K/UL    Lymphocytes Absolute 0.9 0.8 - 3.5 K/UL    Monocytes Absolute 0.3 0.0 - 1.0 K/UL    Eosinophils Absolute 0.0 0.0 - 0.4 K/UL    Basophils Absolute 0.0 0.0 - 0.1 K/UL    Immature Granulocytes Absolute 0.0 K/UL    Differential Type MANUAL      RBC Comment ANISOCYTOSIS  1+             Pre-medications  were administered as ordered and chemotherapy was initiated.  Medications Administered         0.9 % sodium chloride infusion Admin Date  08/13/2024 Action  New Bag Dose  200 mL/hr Rate  200 mL/hr Route  IntraVENous Documented By  Paula Lee, RN        eriBULin mesylate (HALAVEN) injection SOLN 2.3 mg Admin Date  08/13/2024 Action  Given Dose  2.3 mg Rate   Route  IntraVENous Documented By  Paula Lee RN        ondansetron (ZOFRAN) injection 8 mg Admin Date  08/13/2024 Action  Given Dose  8 mg Rate   Route  IntraVENous Documented By  Paula Lee, RN        sodium chloride flush 0.9 % injection 5-40 mL Admin Date  08/13/2024 Action  Given Dose  20 mL Rate   Route  IntraVENous Documented By  Paula Lee, RN              Two nurses verified prior to administering: Drug name, Drug dose, Infusion volume or drug volume when prepared in a syringe, Rate of administration, Route of administration, Expiration dates and/or times, Appearance and physical integrity of the drugs, Rate set on infusion pump, when used, and Sequencing of drug administration.    1330 Patient tolerated treatment well.  Port maintained positive blood return throughout treatment. Port flushed, and de accessed per protocol. Patient was discharged in stable condition. Patient is aware of next scheduled

## 2024-08-20 ENCOUNTER — APPOINTMENT (OUTPATIENT)
Facility: HOSPITAL | Age: 64
End: 2024-08-20
Payer: MEDICAID

## 2024-08-27 ENCOUNTER — HOSPITAL ENCOUNTER (OUTPATIENT)
Facility: HOSPITAL | Age: 64
Setting detail: INFUSION SERIES
Discharge: HOME | End: 2024-08-27
Payer: MEDICAID

## 2024-08-27 ENCOUNTER — OFFICE VISIT (OUTPATIENT)
Age: 64
End: 2024-08-27
Payer: MEDICAID

## 2024-08-27 ENCOUNTER — APPOINTMENT (OUTPATIENT)
Facility: HOSPITAL | Age: 64
End: 2024-08-27
Payer: MEDICAID

## 2024-08-27 VITALS
BODY MASS INDEX: 23.37 KG/M2 | HEIGHT: 62 IN | TEMPERATURE: 98.1 F | SYSTOLIC BLOOD PRESSURE: 134 MMHG | OXYGEN SATURATION: 95 % | HEART RATE: 83 BPM | WEIGHT: 127 LBS | DIASTOLIC BLOOD PRESSURE: 74 MMHG

## 2024-08-27 VITALS
HEART RATE: 84 BPM | DIASTOLIC BLOOD PRESSURE: 74 MMHG | SYSTOLIC BLOOD PRESSURE: 121 MMHG | RESPIRATION RATE: 18 BRPM | BODY MASS INDEX: 23.48 KG/M2 | TEMPERATURE: 98.9 F | OXYGEN SATURATION: 95 % | HEIGHT: 62 IN | WEIGHT: 127.6 LBS

## 2024-08-27 DIAGNOSIS — C50.919 METASTASIS FROM BREAST CANCER (HCC): ICD-10-CM

## 2024-08-27 DIAGNOSIS — C50.811 MALIGNANT NEOPLASM OF OVERLAPPING SITES OF RIGHT BREAST IN FEMALE, ESTROGEN RECEPTOR NEGATIVE (HCC): Primary | ICD-10-CM

## 2024-08-27 DIAGNOSIS — Z17.1 MALIGNANT NEOPLASM OF BREAST IN FEMALE, ESTROGEN RECEPTOR NEGATIVE, UNSPECIFIED LATERALITY, UNSPECIFIED SITE OF BREAST (HCC): ICD-10-CM

## 2024-08-27 DIAGNOSIS — C79.51 CARCINOMA OF BREAST METASTATIC TO BONE, UNSPECIFIED LATERALITY (HCC): ICD-10-CM

## 2024-08-27 DIAGNOSIS — Z51.11 ENCOUNTER FOR ANTINEOPLASTIC CHEMOTHERAPY: Primary | ICD-10-CM

## 2024-08-27 DIAGNOSIS — Z17.1 MALIGNANT NEOPLASM OF OVERLAPPING SITES OF RIGHT BREAST IN FEMALE, ESTROGEN RECEPTOR NEGATIVE (HCC): Primary | ICD-10-CM

## 2024-08-27 DIAGNOSIS — C50.919 MALIGNANT NEOPLASM OF BREAST IN FEMALE, ESTROGEN RECEPTOR NEGATIVE, UNSPECIFIED LATERALITY, UNSPECIFIED SITE OF BREAST (HCC): ICD-10-CM

## 2024-08-27 DIAGNOSIS — C50.919 CARCINOMA OF BREAST METASTATIC TO BONE, UNSPECIFIED LATERALITY (HCC): ICD-10-CM

## 2024-08-27 DIAGNOSIS — C79.9 METASTASIS FROM BREAST CANCER (HCC): ICD-10-CM

## 2024-08-27 LAB
ALBUMIN SERPL-MCNC: 3 G/DL (ref 3.5–5)
ALBUMIN/GLOB SERPL: 0.7 (ref 1.1–2.2)
ALP SERPL-CCNC: 70 U/L (ref 45–117)
ALT SERPL-CCNC: 17 U/L (ref 12–78)
ANION GAP SERPL CALC-SCNC: 5 MMOL/L (ref 5–15)
AST SERPL-CCNC: 43 U/L (ref 15–37)
BASOPHILS # BLD: 0.1 K/UL (ref 0–0.1)
BASOPHILS NFR BLD: 1 % (ref 0–1)
BILIRUB SERPL-MCNC: 0.3 MG/DL (ref 0.2–1)
BUN SERPL-MCNC: 14 MG/DL (ref 6–20)
BUN/CREAT SERPL: 18 (ref 12–20)
CALCIUM SERPL-MCNC: 8.3 MG/DL (ref 8.5–10.1)
CHLORIDE SERPL-SCNC: 104 MMOL/L (ref 97–108)
CO2 SERPL-SCNC: 29 MMOL/L (ref 21–32)
CREAT SERPL-MCNC: 0.77 MG/DL (ref 0.55–1.02)
DIFFERENTIAL METHOD BLD: ABNORMAL
EOSINOPHIL # BLD: 0 K/UL (ref 0–0.4)
EOSINOPHIL NFR BLD: 0 % (ref 0–7)
ERYTHROCYTE [DISTWIDTH] IN BLOOD BY AUTOMATED COUNT: 19.2 % (ref 11.5–14.5)
GLOBULIN SER CALC-MCNC: 4.5 G/DL (ref 2–4)
GLUCOSE SERPL-MCNC: 225 MG/DL (ref 65–100)
HCT VFR BLD AUTO: 32.5 % (ref 35–47)
HGB BLD-MCNC: 10 G/DL (ref 11.5–16)
IMM GRANULOCYTES # BLD AUTO: 0.1 K/UL (ref 0–0.04)
IMM GRANULOCYTES NFR BLD AUTO: 1 % (ref 0–0.5)
LYMPHOCYTES # BLD: 0.7 K/UL (ref 0.8–3.5)
LYMPHOCYTES NFR BLD: 11 % (ref 12–49)
MAGNESIUM SERPL-MCNC: 2.2 MG/DL (ref 1.6–2.4)
MCH RBC QN AUTO: 27.4 PG (ref 26–34)
MCHC RBC AUTO-ENTMCNC: 30.8 G/DL (ref 30–36.5)
MCV RBC AUTO: 89 FL (ref 80–99)
MONOCYTES # BLD: 0.3 K/UL (ref 0–1)
MONOCYTES NFR BLD: 4 % (ref 5–13)
NEUTS SEG # BLD: 5.3 K/UL (ref 1.8–8)
NEUTS SEG NFR BLD: 83 % (ref 32–75)
NRBC # BLD: 0 K/UL (ref 0–0.01)
NRBC BLD-RTO: 0 PER 100 WBC
PLATELET # BLD AUTO: 355 K/UL (ref 150–400)
PMV BLD AUTO: 9.2 FL (ref 8.9–12.9)
POTASSIUM SERPL-SCNC: 3.4 MMOL/L (ref 3.5–5.1)
PROT SERPL-MCNC: 7.5 G/DL (ref 6.4–8.2)
RBC # BLD AUTO: 3.65 M/UL (ref 3.8–5.2)
RBC MORPH BLD: ABNORMAL
SODIUM SERPL-SCNC: 138 MMOL/L (ref 136–145)
WBC # BLD AUTO: 6.5 K/UL (ref 3.6–11)

## 2024-08-27 PROCEDURE — 2580000003 HC RX 258: Performed by: INTERNAL MEDICINE

## 2024-08-27 PROCEDURE — 99215 OFFICE O/P EST HI 40 MIN: CPT | Performed by: INTERNAL MEDICINE

## 2024-08-27 PROCEDURE — 96413 CHEMO IV INFUSION 1 HR: CPT

## 2024-08-27 PROCEDURE — 3078F DIAST BP <80 MM HG: CPT | Performed by: INTERNAL MEDICINE

## 2024-08-27 PROCEDURE — 80053 COMPREHEN METABOLIC PANEL: CPT

## 2024-08-27 PROCEDURE — 3075F SYST BP GE 130 - 139MM HG: CPT | Performed by: INTERNAL MEDICINE

## 2024-08-27 PROCEDURE — 6370000000 HC RX 637 (ALT 250 FOR IP): Performed by: NURSE PRACTITIONER

## 2024-08-27 PROCEDURE — G2211 COMPLEX E/M VISIT ADD ON: HCPCS | Performed by: INTERNAL MEDICINE

## 2024-08-27 PROCEDURE — 85025 COMPLETE CBC W/AUTO DIFF WBC: CPT

## 2024-08-27 PROCEDURE — 83735 ASSAY OF MAGNESIUM: CPT

## 2024-08-27 PROCEDURE — 96375 TX/PRO/DX INJ NEW DRUG ADDON: CPT

## 2024-08-27 PROCEDURE — 6360000002 HC RX W HCPCS: Performed by: INTERNAL MEDICINE

## 2024-08-27 PROCEDURE — 96411 CHEMO IV PUSH ADDL DRUG: CPT

## 2024-08-27 RX ORDER — POTASSIUM CHLORIDE 750 MG/1
40 TABLET, EXTENDED RELEASE ORAL ONCE
Status: COMPLETED | OUTPATIENT
Start: 2024-08-27 | End: 2024-08-27

## 2024-08-27 RX ORDER — SODIUM CHLORIDE 9 MG/ML
5-250 INJECTION, SOLUTION INTRAVENOUS PRN
Status: DISCONTINUED | OUTPATIENT
Start: 2024-08-27 | End: 2024-08-28 | Stop reason: HOSPADM

## 2024-08-27 RX ORDER — ACETAMINOPHEN 325 MG/1
650 TABLET ORAL
Status: DISCONTINUED | OUTPATIENT
Start: 2024-08-27 | End: 2024-08-28 | Stop reason: HOSPADM

## 2024-08-27 RX ORDER — ONDANSETRON 2 MG/ML
8 INJECTION INTRAMUSCULAR; INTRAVENOUS
Status: DISCONTINUED | OUTPATIENT
Start: 2024-08-27 | End: 2024-08-28 | Stop reason: HOSPADM

## 2024-08-27 RX ORDER — FAMOTIDINE 10 MG/ML
20 INJECTION, SOLUTION INTRAVENOUS
Status: DISCONTINUED | OUTPATIENT
Start: 2024-08-27 | End: 2024-08-28 | Stop reason: HOSPADM

## 2024-08-27 RX ORDER — SODIUM CHLORIDE 9 MG/ML
INJECTION, SOLUTION INTRAVENOUS CONTINUOUS
Status: DISCONTINUED | OUTPATIENT
Start: 2024-08-27 | End: 2024-08-28 | Stop reason: HOSPADM

## 2024-08-27 RX ORDER — SODIUM CHLORIDE 0.9 % (FLUSH) 0.9 %
5-40 SYRINGE (ML) INJECTION PRN
Status: DISCONTINUED | OUTPATIENT
Start: 2024-08-27 | End: 2024-08-28 | Stop reason: HOSPADM

## 2024-08-27 RX ORDER — HEPARIN 100 UNIT/ML
500 SYRINGE INTRAVENOUS PRN
Status: DISCONTINUED | OUTPATIENT
Start: 2024-08-27 | End: 2024-08-28 | Stop reason: HOSPADM

## 2024-08-27 RX ORDER — ERIBULIN MESYLATE 0.5 MG/ML
1.4 INJECTION INTRAVENOUS ONCE
Status: COMPLETED | OUTPATIENT
Start: 2024-08-27 | End: 2024-08-27

## 2024-08-27 RX ORDER — ONDANSETRON 2 MG/ML
8 INJECTION INTRAMUSCULAR; INTRAVENOUS ONCE
Status: COMPLETED | OUTPATIENT
Start: 2024-08-27 | End: 2024-08-27

## 2024-08-27 RX ORDER — EPINEPHRINE 1 MG/ML
0.3 INJECTION, SOLUTION INTRAMUSCULAR; SUBCUTANEOUS PRN
Status: DISCONTINUED | OUTPATIENT
Start: 2024-08-27 | End: 2024-08-28 | Stop reason: HOSPADM

## 2024-08-27 RX ORDER — PROCHLORPERAZINE EDISYLATE 5 MG/ML
10 INJECTION INTRAMUSCULAR; INTRAVENOUS
Status: DISCONTINUED | OUTPATIENT
Start: 2024-08-27 | End: 2024-08-28 | Stop reason: HOSPADM

## 2024-08-27 RX ORDER — ALBUTEROL SULFATE 90 UG/1
4 AEROSOL, METERED RESPIRATORY (INHALATION) PRN
Status: DISCONTINUED | OUTPATIENT
Start: 2024-08-27 | End: 2024-08-28 | Stop reason: HOSPADM

## 2024-08-27 RX ORDER — DIPHENHYDRAMINE HYDROCHLORIDE 50 MG/ML
50 INJECTION INTRAMUSCULAR; INTRAVENOUS
Status: DISCONTINUED | OUTPATIENT
Start: 2024-08-27 | End: 2024-08-28 | Stop reason: HOSPADM

## 2024-08-27 RX ADMIN — MARGETUXIMAB-CMKB 912.5 MG: 25 INJECTION, SOLUTION, CONCENTRATE INTRAVENOUS at 14:24

## 2024-08-27 RX ADMIN — ERIBULIN MESYLATE 2.3 MG: 0.5 INJECTION INTRAVENOUS at 14:13

## 2024-08-27 RX ADMIN — SODIUM CHLORIDE 25 ML/HR: 9 INJECTION, SOLUTION INTRAVENOUS at 13:34

## 2024-08-27 RX ADMIN — ONDANSETRON 8 MG: 2 INJECTION INTRAMUSCULAR; INTRAVENOUS at 13:37

## 2024-08-27 RX ADMIN — POTASSIUM CHLORIDE 40 MEQ: 750 TABLET, FILM COATED, EXTENDED RELEASE ORAL at 15:06

## 2024-08-27 RX ADMIN — HYDROCORTISONE SODIUM SUCCINATE 100 MG: 100 INJECTION, POWDER, FOR SOLUTION INTRAMUSCULAR; INTRAVENOUS at 13:34

## 2024-08-27 ASSESSMENT — PAIN SCALES - GENERAL: PAINLEVEL_OUTOF10: 0

## 2024-08-27 NOTE — PROGRESS NOTES
[facility] Chemotherapy Progress Note  Date: 2024  Name: Debra Moser  MRN: [id number]       : 1960    Pt arrived ambulatory to [facility] for C4 D1 Traevan/Eliezer   Assessment completed, no new concerns. Port accessed with +BR labs drawn and sent for processing. Pt proceeded with MD visit today. Parameters met for treatment today. Potassium 3.4 oral K ordered and administered.     Ms. Lobito Moser's vitals were reviewed.  Patient Vitals for the past 12 hrs:   Temp Pulse Resp BP SpO2   24 1121 98.9 °F (37.2 °C) 84 18 121/74 95 %       Lab results were obtained and reviewed.  Recent Results (from the past 12 hour(s))   Magnesium    Collection Time: 24 11:23 AM   Result Value Ref Range    Magnesium 2.2 1.6 - 2.4 mg/dL   Comprehensive metabolic panel    Collection Time: 24 11:23 AM   Result Value Ref Range    Sodium 138 136 - 145 mmol/L    Potassium 3.4 (L) 3.5 - 5.1 mmol/L    Chloride 104 97 - 108 mmol/L    CO2 29 21 - 32 mmol/L    Anion Gap 5 5 - 15 mmol/L    Glucose 225 (H) 65 - 100 mg/dL    BUN 14 6 - 20 MG/DL    Creatinine 0.77 0.55 - 1.02 MG/DL    BUN/Creatinine Ratio 18 12 - 20      Est, Glom Filt Rate 86 >60 ml/min/1.73m2    Calcium 8.3 (L) 8.5 - 10.1 MG/DL    Total Bilirubin 0.3 0.2 - 1.0 MG/DL    ALT 17 12 - 78 U/L    AST 43 (H) 15 - 37 U/L    Alk Phosphatase 70 45 - 117 U/L    Total Protein 7.5 6.4 - 8.2 g/dL    Albumin 3.0 (L) 3.5 - 5.0 g/dL    Globulin 4.5 (H) 2.0 - 4.0 g/dL    Albumin/Globulin Ratio 0.7 (L) 1.1 - 2.2     CBC With Auto Differential    Collection Time: 24 11:23 AM   Result Value Ref Range    WBC 6.5 3.6 - 11.0 K/uL    RBC 3.65 (L) 3.80 - 5.20 M/uL    Hemoglobin 10.0 (L) 11.5 - 16.0 g/dL    Hematocrit 32.5 (L) 35.0 - 47.0 %    MCV 89.0 80.0 - 99.0 FL    MCH 27.4 26.0 - 34.0 PG    MCHC 30.8 30.0 - 36.5 g/dL    RDW 19.2 (H) 11.5 - 14.5 %    Platelets 355 150 - 400 K/uL    MPV 9.2 8.9 - 12.9 FL    Nucleated RBCs 0.0 0  WBC    nRBC 0.00 0.00 - 0.01  K/uL    Neutrophils % 83 (H) 32 - 75 %    Lymphocytes % 11 (L) 12 - 49 %    Monocytes % 4 (L) 5 - 13 %    Eosinophils % 0 0 - 7 %    Basophils % 1 0 - 1 %    Immature Granulocytes % 1 (H) 0.0 - 0.5 %    Neutrophils Absolute 5.3 1.8 - 8.0 K/UL    Lymphocytes Absolute 0.7 (L) 0.8 - 3.5 K/UL    Monocytes Absolute 0.3 0.0 - 1.0 K/UL    Eosinophils Absolute 0.0 0.0 - 0.4 K/UL    Basophils Absolute 0.1 0.0 - 0.1 K/UL    Immature Granulocytes Absolute 0.1 (H) 0.00 - 0.04 K/UL    Differential Type SMEAR SCANNED      RBC Comment ANISOCYTOSIS  1+           Pre-medications  were administered as ordered and chemotherapy was initiated.  Medications Administered         0.9 % sodium chloride infusion Admin Date  2024 Action  New Bag Dose  25 mL/hr Rate  25 mL/hr Route  IntraVENous Documented By  Valery George RN        eriBULin mesylate (HALAVEN) injection SOLN 2.3 mg Admin Date  2024 Action  Given Dose  2.3 mg Rate   Route  IntraVENous Documented By  Valery George RN        hydrocortisone sodium succinate PF (SOLU-CORTEF) injection 100 mg Admin Date  2024 Action  Given Dose  100 mg Rate   Route  IntraVENous Documented By  Valery George RN        margetuximab-cmkb (MARGENZA) 912.5 mg in sodium chloride 0.9 % 250 mL chemo IVPB Admin Date  2024 Action  New Bag Dose  912.5 mg Rate  623 mL/hr Route  IntraVENous Documented By  Valery George RN        ondansetron (ZOFRAN) injection 8 mg Admin Date  2024 Action  Given Dose  8 mg Rate   Route  IntraVENous Documented By  Valery George RN        potassium chloride (KLOR-CON) extended release tablet 40 mEq Admin Date  2024 Action  Given Dose  40 mEq Rate   Route  Oral Documented By  Valery George RN            Prior to chemotherapy/immunotherapy administration the following were verified with a second chemotherapy/immunotherapy certified nurse: Patient name, Patient  or CSN, Drug name, Drug dose, Infusion/drug volume, Rate of

## 2024-08-27 NOTE — PROGRESS NOTES
I have reviewed all needed documentation in preparation for visit. Verified patient by name and date of birth  Debra Moser is a 64 y.o. female Chemotherapy  .    Vitals:    08/27/24 1148   BP: 134/74   Site: Left Upper Arm   Pulse: 83   Temp: 98.1 °F (36.7 °C)   SpO2: 95%   Weight: 57.6 kg (127 lb)   Height: 1.575 m (5' 2.01\")       No LMP recorded. Patient is postmenopausal.         Health Maintenance Review: Patient reminded of \"due or due soon\" health maintenance. I have asked the patient to contact his/her primary care provider (PCP) for follow-up on his/her health maintenance.    \"Have you been to the ER, urgent care clinic since your last visit?  Hospitalized since your last visit?\"    NO    “Have you seen or consulted any other health care providers outside of [facility] since your last visit?”    NO

## 2024-08-27 NOTE — PROGRESS NOTES
1.4 cm to 1.9 x 1.1 cm (image 25)..  LIVER: Stable 2.2 x 2.2 cm and 1.6 x 1.6 cm hypodensities in hepatic segment 7,,  now surrounded by zone of decreased density through which pass normal vessels.  Decreased density of the liver diffusely.  GALLBLADDER: Unremarkable.  SPLEEN: No mass.  PANCREAS: No mass or ductal dilatation.  ADRENALS: Unremarkable. .  KIDNEYS: No mass, calculus, or hydronephrosis.  STOMACH: Unremarkable.  SMALL BOWEL: No dilatation or wall thickening.  COLON: No dilatation or wall thickening.  APPENDIX: Unremarkable.  PERITONEUM: No pneumoperitoneum.   RETROPERITONEUM: No lymphadenopathy or aortic aneurysm.  REPRODUCTIVE ORGANS: Prior hysterectomy  URINARY BLADDER: No mass or calculus.  BONES: No destructive bone lesion.  ADDITIONAL COMMENTS: N/A     IMPRESSION:  Interval slight clearing of bibasilar consolidations. Unchanged consolidation in  the medial aspect of the right middle lobe.  Stable hepatic lesions.  Unchanged multiple pulmonary nodules and diffuse interstitial process     RECIST   No significant change     TARGET LESIONS:      Lesion (description)         Location (series/slice)                Size                                              1. Right middle lobe    3, 21    12 mm  2. Apical segment right lower lobe    3, 27    17 mm  3. Segment 7 hepatic    2, 45    22 mm  4.  segment 7 hepatic    2, 47    16 mm  5.     NONTARGET LESIONS:     1. Right pleural effusion, present  2.  3.  4.         Records reviewed and summarized above.  Pathology report(s) reviewed above.  Radiology report(s) reviewed above.      Assessment/plan:   1.  Metastatic recurrent right breast cancer, ER negative, [place] negative, HER 2 positive:      Mets to neck LN, hilar LN, left ax LN, liver, lung, bones    Discussed that while this is treatable, it is not curable, goal of therapy is palliative.      Scans on 6/5/24 show worsening right pleural effusion. Discussed changing to margetuximab 15 mg/kg IV q 3  CT which was not scheduled. Decreased to 30mg prednisone 12/12/23. Cough improved per patient. Decreased to 20mg daily 1/2/24. decreased to 10mg daily, 1/23/24. Decreased to 10 mg every other day 2/13/24. Discontinued 3/12/24.     She then continued with mild cough that was likely due to cancer progression, improved with tessalon 100mg TID prn. She reports she had improvement with levofloxacin but after completion she had worsening.     Right pleural effusion.  Thoracentesis 6/14/24 with cytology + for metastatic breast cancer, [place] -, HER2 positive by IHC.     7/1/24 right thoracentesis:  250 mL removed    7. HTN: stopped amlodipine, normal bp today    8. AST elevation:  mild, due to cancer and therapy    9. Hypokalemia:  will replete in opic prn    10. L posterior parietal CVA:  referred to neurology, sees them in oct    11. Nausea: PRN compazine and ondansetron    12. Mouth sores: Viscous lidocaine ordered previously, none present today.     13. Right upper arm edema: venous doppler negative 6/4/24    14. Acute RUL PE:  7/2/24, on eliquis, continue    15. Constipation:  colace    16. Weight monitoring:  loss of appetite, weight 127 lbs, up 2 lbs; discussed ensure today; taking dexamethasone 1 mg PO daily      [phone], fani alexander, son    I appreciate the opportunity to participate in Ms. Debra Alexander's care.    Signed By: Doyle Underwood MD      No follow-ups on file.

## 2024-09-04 ENCOUNTER — TELEPHONE (OUTPATIENT)
Age: 64
End: 2024-09-04

## 2024-09-04 NOTE — TELEPHONE ENCOUNTER
Spoke with patient using a  and she was unaware of her appointment today and it has been rescheduled for Friday 9/6/24, she had no further questions.

## 2024-09-06 ENCOUNTER — HOSPITAL ENCOUNTER (OUTPATIENT)
Facility: HOSPITAL | Age: 64
Setting detail: INFUSION SERIES
Discharge: HOME | End: 2024-09-06
Payer: MEDICAID

## 2024-09-06 VITALS
BODY MASS INDEX: 23.61 KG/M2 | HEART RATE: 89 BPM | OXYGEN SATURATION: 93 % | SYSTOLIC BLOOD PRESSURE: 124 MMHG | RESPIRATION RATE: 18 BRPM | WEIGHT: 128.3 LBS | DIASTOLIC BLOOD PRESSURE: 65 MMHG | TEMPERATURE: 98.7 F | HEIGHT: 62 IN

## 2024-09-06 DIAGNOSIS — C50.811 MALIGNANT NEOPLASM OF OVERLAPPING SITES OF RIGHT BREAST IN FEMALE, ESTROGEN RECEPTOR NEGATIVE (HCC): Primary | ICD-10-CM

## 2024-09-06 DIAGNOSIS — B99.9 INFECTION: ICD-10-CM

## 2024-09-06 DIAGNOSIS — L29.9 ITCHING: ICD-10-CM

## 2024-09-06 DIAGNOSIS — Z17.1 MALIGNANT NEOPLASM OF OVERLAPPING SITES OF RIGHT BREAST IN FEMALE, ESTROGEN RECEPTOR NEGATIVE (HCC): Primary | ICD-10-CM

## 2024-09-06 DIAGNOSIS — C50.919 MALIGNANT NEOPLASM OF BREAST IN FEMALE, ESTROGEN RECEPTOR NEGATIVE, UNSPECIFIED LATERALITY, UNSPECIFIED SITE OF BREAST (HCC): Primary | ICD-10-CM

## 2024-09-06 DIAGNOSIS — Z17.1 MALIGNANT NEOPLASM OF BREAST IN FEMALE, ESTROGEN RECEPTOR NEGATIVE, UNSPECIFIED LATERALITY, UNSPECIFIED SITE OF BREAST (HCC): Primary | ICD-10-CM

## 2024-09-06 DIAGNOSIS — C50.911 CARCINOMA OF RIGHT BREAST METASTATIC TO BONE (HCC): ICD-10-CM

## 2024-09-06 DIAGNOSIS — C50.919 METASTASIS FROM BREAST CANCER (HCC): ICD-10-CM

## 2024-09-06 DIAGNOSIS — C78.7 CARCINOMA OF RIGHT BREAST METASTATIC TO LIVER (HCC): ICD-10-CM

## 2024-09-06 DIAGNOSIS — C79.51 CARCINOMA OF BREAST METASTATIC TO BONE, UNSPECIFIED LATERALITY (HCC): ICD-10-CM

## 2024-09-06 DIAGNOSIS — C79.9 METASTASIS FROM BREAST CANCER (HCC): ICD-10-CM

## 2024-09-06 DIAGNOSIS — C50.919 CARCINOMA OF BREAST METASTATIC TO BONE, UNSPECIFIED LATERALITY (HCC): ICD-10-CM

## 2024-09-06 DIAGNOSIS — C50.911 CARCINOMA OF RIGHT BREAST METASTATIC TO LIVER (HCC): ICD-10-CM

## 2024-09-06 DIAGNOSIS — C78.00 BREAST CANCER METASTASIZED TO LUNG, RIGHT (HCC): ICD-10-CM

## 2024-09-06 DIAGNOSIS — Z51.11 ENCOUNTER FOR ANTINEOPLASTIC CHEMOTHERAPY: ICD-10-CM

## 2024-09-06 DIAGNOSIS — C50.911 BREAST CANCER METASTASIZED TO LUNG, RIGHT (HCC): ICD-10-CM

## 2024-09-06 DIAGNOSIS — C79.51 CARCINOMA OF RIGHT BREAST METASTATIC TO BONE (HCC): ICD-10-CM

## 2024-09-06 LAB
ALBUMIN SERPL-MCNC: 2.9 G/DL (ref 3.5–5)
ALBUMIN/GLOB SERPL: 0.6 (ref 1.1–2.2)
ALP SERPL-CCNC: 67 U/L (ref 45–117)
ALT SERPL-CCNC: 17 U/L (ref 12–78)
ANION GAP SERPL CALC-SCNC: 6 MMOL/L (ref 2–12)
AST SERPL-CCNC: 39 U/L (ref 15–37)
BASOPHILS # BLD: 0 K/UL (ref 0–0.1)
BASOPHILS NFR BLD: 1 % (ref 0–1)
BILIRUB SERPL-MCNC: 0.4 MG/DL (ref 0.2–1)
BUN SERPL-MCNC: 13 MG/DL (ref 6–20)
BUN/CREAT SERPL: 19 (ref 12–20)
CALCIUM SERPL-MCNC: 8.9 MG/DL (ref 8.5–10.1)
CHLORIDE SERPL-SCNC: 103 MMOL/L (ref 97–108)
CO2 SERPL-SCNC: 29 MMOL/L (ref 21–32)
CREAT SERPL-MCNC: 0.68 MG/DL (ref 0.55–1.02)
DIFFERENTIAL METHOD BLD: ABNORMAL
EOSINOPHIL # BLD: 0 K/UL (ref 0–0.4)
EOSINOPHIL NFR BLD: 0 % (ref 0–7)
ERYTHROCYTE [DISTWIDTH] IN BLOOD BY AUTOMATED COUNT: 19.2 % (ref 11.5–14.5)
GLOBULIN SER CALC-MCNC: 4.8 G/DL (ref 2–4)
GLUCOSE SERPL-MCNC: 213 MG/DL (ref 65–100)
HCT VFR BLD AUTO: 32.8 % (ref 35–47)
HGB BLD-MCNC: 9.9 G/DL (ref 11.5–16)
IMM GRANULOCYTES # BLD AUTO: 0 K/UL (ref 0–0.04)
IMM GRANULOCYTES NFR BLD AUTO: 1 % (ref 0–0.5)
LYMPHOCYTES # BLD: 0.8 K/UL (ref 0.8–3.5)
LYMPHOCYTES NFR BLD: 30 % (ref 12–49)
MAGNESIUM SERPL-MCNC: 1.9 MG/DL (ref 1.6–2.4)
MCH RBC QN AUTO: 27 PG (ref 26–34)
MCHC RBC AUTO-ENTMCNC: 30.2 G/DL (ref 30–36.5)
MCV RBC AUTO: 89.4 FL (ref 80–99)
MONOCYTES # BLD: 0.3 K/UL (ref 0–1)
MONOCYTES NFR BLD: 11 % (ref 5–13)
NEUTS SEG # BLD: 1.5 K/UL (ref 1.8–8)
NEUTS SEG NFR BLD: 57 % (ref 32–75)
NRBC # BLD: 0 K/UL (ref 0–0.01)
NRBC BLD-RTO: 0 PER 100 WBC
PLATELET # BLD AUTO: 304 K/UL (ref 150–400)
PMV BLD AUTO: 8.1 FL (ref 8.9–12.9)
POTASSIUM SERPL-SCNC: 3.1 MMOL/L (ref 3.5–5.1)
PROT SERPL-MCNC: 7.7 G/DL (ref 6.4–8.2)
RBC # BLD AUTO: 3.67 M/UL (ref 3.8–5.2)
RBC MORPH BLD: ABNORMAL
SODIUM SERPL-SCNC: 138 MMOL/L (ref 136–145)
WBC # BLD AUTO: 2.6 K/UL (ref 3.6–11)

## 2024-09-06 PROCEDURE — 80053 COMPREHEN METABOLIC PANEL: CPT

## 2024-09-06 PROCEDURE — 2580000003 HC RX 258: Performed by: INTERNAL MEDICINE

## 2024-09-06 PROCEDURE — 83735 ASSAY OF MAGNESIUM: CPT

## 2024-09-06 PROCEDURE — 96375 TX/PRO/DX INJ NEW DRUG ADDON: CPT

## 2024-09-06 PROCEDURE — 6370000000 HC RX 637 (ALT 250 FOR IP): Performed by: NURSE PRACTITIONER

## 2024-09-06 PROCEDURE — 96409 CHEMO IV PUSH SNGL DRUG: CPT

## 2024-09-06 PROCEDURE — 85025 COMPLETE CBC W/AUTO DIFF WBC: CPT

## 2024-09-06 PROCEDURE — 6360000002 HC RX W HCPCS: Performed by: INTERNAL MEDICINE

## 2024-09-06 RX ORDER — ERIBULIN MESYLATE 0.5 MG/ML
1.4 INJECTION INTRAVENOUS ONCE
Status: COMPLETED | OUTPATIENT
Start: 2024-09-06 | End: 2024-09-06

## 2024-09-06 RX ORDER — POTASSIUM CHLORIDE 750 MG/1
40 TABLET, EXTENDED RELEASE ORAL ONCE
Status: COMPLETED | OUTPATIENT
Start: 2024-09-06 | End: 2024-09-06

## 2024-09-06 RX ORDER — SODIUM CHLORIDE 9 MG/ML
5-250 INJECTION, SOLUTION INTRAVENOUS PRN
Status: DISCONTINUED | OUTPATIENT
Start: 2024-09-06 | End: 2024-09-07 | Stop reason: HOSPADM

## 2024-09-06 RX ORDER — HYDROXYZINE HYDROCHLORIDE 25 MG/1
25 TABLET, FILM COATED ORAL EVERY 8 HOURS PRN
Qty: 30 TABLET | Refills: 0 | Status: SHIPPED | OUTPATIENT
Start: 2024-09-06 | End: 2024-09-16

## 2024-09-06 RX ORDER — SODIUM CHLORIDE 0.9 % (FLUSH) 0.9 %
5-40 SYRINGE (ML) INJECTION PRN
Status: DISCONTINUED | OUTPATIENT
Start: 2024-09-06 | End: 2024-09-07 | Stop reason: HOSPADM

## 2024-09-06 RX ORDER — CEPHALEXIN 500 MG/1
500 CAPSULE ORAL 4 TIMES DAILY
Qty: 20 CAPSULE | Refills: 0 | Status: SHIPPED | OUTPATIENT
Start: 2024-09-06

## 2024-09-06 RX ORDER — ONDANSETRON 2 MG/ML
8 INJECTION INTRAMUSCULAR; INTRAVENOUS ONCE
Status: COMPLETED | OUTPATIENT
Start: 2024-09-06 | End: 2024-09-06

## 2024-09-06 RX ADMIN — POTASSIUM CHLORIDE 40 MEQ: 750 TABLET, FILM COATED, EXTENDED RELEASE ORAL at 13:32

## 2024-09-06 RX ADMIN — Medication 20 ML: at 13:45

## 2024-09-06 RX ADMIN — Medication 10 ML: at 11:35

## 2024-09-06 RX ADMIN — ONDANSETRON 8 MG: 2 INJECTION INTRAMUSCULAR; INTRAVENOUS at 12:59

## 2024-09-06 RX ADMIN — SODIUM CHLORIDE 25 ML/HR: 9 INJECTION, SOLUTION INTRAVENOUS at 12:59

## 2024-09-06 RX ADMIN — ERIBULIN MESYLATE 2.3 MG: 0.5 INJECTION INTRAVENOUS at 13:39

## 2024-09-06 ASSESSMENT — PAIN SCALES - GENERAL: PAINLEVEL_OUTOF10: 0

## 2024-09-06 ASSESSMENT — PAIN SCALES - WONG BAKER: WONGBAKER_NUMERICALRESPONSE: NO HURT

## 2024-09-06 NOTE — PROGRESS NOTES
[facility] Chemo Progress Note    Date: September 6, 2024    1130am,: Ms. Lobito Moser Arrived ambulatory and in stable condition to the [facility] for  C4D8 Eribulin Regimen.  Assessment was completed, Patient reports increased itching to lesions on left side of neck. Top lesion is inflamed and raw from the itching. MD office notified of this change. Medication sent to patient's pharmacy to help with the itching by MD office. Port accessed with positive blood return. Labs drawn and sent for processing. Labs reviewed. Criteria for treatment was met.    Magnesium 1.9. Ok to proceed with treatment received by JAREN Byrd.  Potassium 3.1. Potassium repleted.      services used for today's visit. #[id number], #[id number], #[id number]    Ms. Lobito Moser's vitals were reviewed.  Patient Vitals for the past 12 hrs:   Temp Pulse Resp BP SpO2   09/06/24 1130 98.7 °F (37.1 °C) 92 18 123/63 93 %     Lab results were obtained and reviewed.  Medications Administered         0.9 % sodium chloride infusion Admin Date  09/06/2024 Action  New Bag Dose  25 mL/hr Rate  25 mL/hr Route  IntraVENous Documented By  Marilyn Messer RN        eriBULin mesylate (HALAVEN) injection SOLN 2.3 mg Admin Date  09/06/2024 Action  Given Dose  2.3 mg Rate   Route  IntraVENous Documented By  Marilyn Messer RN        ondansetron (ZOFRAN) injection 8 mg Admin Date  09/06/2024 Action  Given Dose  8 mg Rate   Route  IntraVENous Documented By  Marilyn Messer RN        potassium chloride (KLOR-CON) extended release tablet 40 mEq Admin Date  09/06/2024 Action  Given Dose  40 mEq Rate   Route  Oral Documented By  Marilyn Messer RN        sodium chloride flush 0.9 % injection 5-40 mL Admin Date  09/06/2024 Action  Given Dose  10 mL Rate   Route  IntraVENous Documented By  Marilyn Messer RN        sodium chloride flush 0.9 % injection 5-40 mL Admin Date  09/06/2024 Action  Given Dose  20 mL Rate   Route  IntraVENous Documented By  Mairlyn Messer RN

## 2024-09-10 ENCOUNTER — APPOINTMENT (OUTPATIENT)
Facility: HOSPITAL | Age: 64
End: 2024-09-10
Payer: MEDICAID

## 2024-09-10 RX ORDER — SODIUM CHLORIDE 9 MG/ML
5-250 INJECTION, SOLUTION INTRAVENOUS PRN
Status: CANCELLED | OUTPATIENT
Start: 2024-09-24

## 2024-09-10 RX ORDER — ONDANSETRON 2 MG/ML
8 INJECTION INTRAMUSCULAR; INTRAVENOUS ONCE
Status: CANCELLED | OUTPATIENT
Start: 2024-09-24 | End: 2024-09-17

## 2024-09-10 RX ORDER — SODIUM CHLORIDE 9 MG/ML
INJECTION, SOLUTION INTRAVENOUS CONTINUOUS
Status: CANCELLED | OUTPATIENT
Start: 2024-09-24

## 2024-09-10 RX ORDER — HEPARIN 100 UNIT/ML
500 SYRINGE INTRAVENOUS PRN
Status: CANCELLED | OUTPATIENT
Start: 2024-09-24

## 2024-09-10 RX ORDER — ONDANSETRON 2 MG/ML
8 INJECTION INTRAMUSCULAR; INTRAVENOUS
Status: CANCELLED | OUTPATIENT
Start: 2024-09-24

## 2024-09-10 RX ORDER — ACETAMINOPHEN 325 MG/1
650 TABLET ORAL
Status: CANCELLED | OUTPATIENT
Start: 2024-09-24

## 2024-09-10 RX ORDER — EPINEPHRINE 1 MG/ML
0.3 INJECTION, SOLUTION INTRAMUSCULAR; SUBCUTANEOUS PRN
Status: CANCELLED | OUTPATIENT
Start: 2024-09-24

## 2024-09-10 RX ORDER — PROCHLORPERAZINE EDISYLATE 5 MG/ML
10 INJECTION INTRAMUSCULAR; INTRAVENOUS
Status: CANCELLED | OUTPATIENT
Start: 2024-09-24

## 2024-09-10 RX ORDER — DIPHENHYDRAMINE HYDROCHLORIDE 50 MG/ML
50 INJECTION INTRAMUSCULAR; INTRAVENOUS
Status: CANCELLED | OUTPATIENT
Start: 2024-09-24

## 2024-09-10 RX ORDER — ALBUTEROL SULFATE 90 UG/1
4 INHALANT RESPIRATORY (INHALATION) PRN
Status: CANCELLED | OUTPATIENT
Start: 2024-09-24

## 2024-09-10 RX ORDER — SODIUM CHLORIDE 0.9 % (FLUSH) 0.9 %
5-40 SYRINGE (ML) INJECTION PRN
Status: CANCELLED | OUTPATIENT
Start: 2024-09-24

## 2024-09-10 RX ORDER — FAMOTIDINE 10 MG/ML
20 INJECTION, SOLUTION INTRAVENOUS
Status: CANCELLED | OUTPATIENT
Start: 2024-09-24

## 2024-09-10 RX ORDER — ERIBULIN MESYLATE 0.5 MG/ML
1.4 INJECTION INTRAVENOUS ONCE
Status: CANCELLED | OUTPATIENT
Start: 2024-09-24 | End: 2024-09-17

## 2024-09-17 ENCOUNTER — HOSPITAL ENCOUNTER (OUTPATIENT)
Facility: HOSPITAL | Age: 64
Setting detail: INFUSION SERIES
Discharge: HOME | End: 2024-09-17

## 2024-09-17 ENCOUNTER — OFFICE VISIT (OUTPATIENT)
Age: 64
End: 2024-09-17

## 2024-09-17 ENCOUNTER — CLINICAL DOCUMENTATION (OUTPATIENT)
Age: 64
End: 2024-09-17

## 2024-09-17 ENCOUNTER — APPOINTMENT (OUTPATIENT)
Facility: HOSPITAL | Age: 64
End: 2024-09-17
Payer: MEDICAID

## 2024-09-17 ENCOUNTER — HOSPITAL ENCOUNTER (OUTPATIENT)
Facility: HOSPITAL | Age: 64
Setting detail: SPECIMEN
Discharge: HOME | End: 2024-09-20

## 2024-09-17 ENCOUNTER — OFFICE VISIT (OUTPATIENT)
Age: 64
End: 2024-09-17
Payer: MEDICAID

## 2024-09-17 VITALS
OXYGEN SATURATION: 91 % | RESPIRATION RATE: 18 BRPM | TEMPERATURE: 98.4 F | SYSTOLIC BLOOD PRESSURE: 126 MMHG | DIASTOLIC BLOOD PRESSURE: 68 MMHG | BODY MASS INDEX: 23.37 KG/M2 | HEIGHT: 62 IN | WEIGHT: 127 LBS | HEART RATE: 90 BPM

## 2024-09-17 VITALS
TEMPERATURE: 98.4 F | SYSTOLIC BLOOD PRESSURE: 126 MMHG | WEIGHT: 127 LBS | RESPIRATION RATE: 18 BRPM | HEIGHT: 62 IN | BODY MASS INDEX: 23.37 KG/M2 | DIASTOLIC BLOOD PRESSURE: 68 MMHG | HEART RATE: 90 BPM | OXYGEN SATURATION: 96 %

## 2024-09-17 VITALS — HEIGHT: 62 IN | BODY MASS INDEX: 23.37 KG/M2 | WEIGHT: 127 LBS

## 2024-09-17 DIAGNOSIS — C50.911 CARCINOMA OF RIGHT BREAST METASTATIC TO BONE (HCC): ICD-10-CM

## 2024-09-17 DIAGNOSIS — R22.1 MASS OF RIGHT SIDE OF NECK: Primary | ICD-10-CM

## 2024-09-17 DIAGNOSIS — R22.1 MASS OF LEFT SIDE OF NECK: ICD-10-CM

## 2024-09-17 DIAGNOSIS — C50.911 BREAST CANCER METASTASIZED TO LUNG, RIGHT (HCC): ICD-10-CM

## 2024-09-17 DIAGNOSIS — Z51.11 ENCOUNTER FOR ANTINEOPLASTIC CHEMOTHERAPY: Primary | ICD-10-CM

## 2024-09-17 DIAGNOSIS — C79.51 CARCINOMA OF RIGHT BREAST METASTATIC TO BONE (HCC): ICD-10-CM

## 2024-09-17 DIAGNOSIS — C50.919 MALIGNANT NEOPLASM OF BREAST IN FEMALE, ESTROGEN RECEPTOR NEGATIVE, UNSPECIFIED LATERALITY, UNSPECIFIED SITE OF BREAST (HCC): ICD-10-CM

## 2024-09-17 DIAGNOSIS — Z17.1 MALIGNANT NEOPLASM OF BREAST IN FEMALE, ESTROGEN RECEPTOR NEGATIVE, UNSPECIFIED LATERALITY, UNSPECIFIED SITE OF BREAST (HCC): ICD-10-CM

## 2024-09-17 DIAGNOSIS — C50.919 CARCINOMA OF BREAST METASTATIC TO BONE, UNSPECIFIED LATERALITY (HCC): ICD-10-CM

## 2024-09-17 DIAGNOSIS — R22.1 MASS OF RIGHT SIDE OF NECK: ICD-10-CM

## 2024-09-17 DIAGNOSIS — R64 CANCER CACHEXIA (HCC): Primary | ICD-10-CM

## 2024-09-17 DIAGNOSIS — C79.51 CARCINOMA OF BREAST METASTATIC TO BONE, UNSPECIFIED LATERALITY (HCC): ICD-10-CM

## 2024-09-17 DIAGNOSIS — Z17.1 MALIGNANT NEOPLASM OF OVERLAPPING SITES OF RIGHT BREAST IN FEMALE, ESTROGEN RECEPTOR NEGATIVE (HCC): ICD-10-CM

## 2024-09-17 DIAGNOSIS — C79.9 METASTASIS FROM BREAST CANCER (HCC): ICD-10-CM

## 2024-09-17 DIAGNOSIS — C50.811 MALIGNANT NEOPLASM OF OVERLAPPING SITES OF RIGHT BREAST IN FEMALE, ESTROGEN RECEPTOR NEGATIVE (HCC): ICD-10-CM

## 2024-09-17 DIAGNOSIS — C78.00 BREAST CANCER METASTASIZED TO LUNG, RIGHT (HCC): ICD-10-CM

## 2024-09-17 DIAGNOSIS — C50.911 CARCINOMA OF RIGHT BREAST METASTATIC TO LIVER (HCC): ICD-10-CM

## 2024-09-17 DIAGNOSIS — C50.919 METASTASIS FROM BREAST CANCER (HCC): ICD-10-CM

## 2024-09-17 DIAGNOSIS — C78.7 CARCINOMA OF RIGHT BREAST METASTATIC TO LIVER (HCC): ICD-10-CM

## 2024-09-17 LAB
ALBUMIN SERPL-MCNC: 2.8 G/DL (ref 3.5–5)
ALBUMIN/GLOB SERPL: 0.6 (ref 1.1–2.2)
ALP SERPL-CCNC: 79 U/L (ref 45–117)
ALT SERPL-CCNC: 12 U/L (ref 12–78)
ANION GAP SERPL CALC-SCNC: 2 MMOL/L (ref 2–12)
AST SERPL-CCNC: 37 U/L (ref 15–37)
BASOPHILS # BLD: 0 K/UL (ref 0–0.1)
BASOPHILS NFR BLD: 1 % (ref 0–1)
BILIRUB SERPL-MCNC: 0.4 MG/DL (ref 0.2–1)
BUN SERPL-MCNC: 10 MG/DL (ref 6–20)
BUN/CREAT SERPL: 16 (ref 12–20)
CALCIUM SERPL-MCNC: 9.1 MG/DL (ref 8.5–10.1)
CHLORIDE SERPL-SCNC: 105 MMOL/L (ref 97–108)
CO2 SERPL-SCNC: 31 MMOL/L (ref 21–32)
CREAT SERPL-MCNC: 0.62 MG/DL (ref 0.55–1.02)
DIFFERENTIAL METHOD BLD: ABNORMAL
EOSINOPHIL # BLD: 0 K/UL (ref 0–0.4)
EOSINOPHIL NFR BLD: 0 % (ref 0–7)
ERYTHROCYTE [DISTWIDTH] IN BLOOD BY AUTOMATED COUNT: 19.1 % (ref 11.5–14.5)
GLOBULIN SER CALC-MCNC: 5 G/DL (ref 2–4)
GLUCOSE SERPL-MCNC: 177 MG/DL (ref 65–100)
HCT VFR BLD AUTO: 31.6 % (ref 35–47)
HGB BLD-MCNC: 9.6 G/DL (ref 11.5–16)
IMM GRANULOCYTES # BLD AUTO: 0 K/UL (ref 0–0.04)
IMM GRANULOCYTES NFR BLD AUTO: 0 % (ref 0–0.5)
LYMPHOCYTES # BLD: 1 K/UL (ref 0.8–3.5)
LYMPHOCYTES NFR BLD: 36 % (ref 12–49)
MAGNESIUM SERPL-MCNC: 2.1 MG/DL (ref 1.6–2.4)
MCH RBC QN AUTO: 26.3 PG (ref 26–34)
MCHC RBC AUTO-ENTMCNC: 30.4 G/DL (ref 30–36.5)
MCV RBC AUTO: 86.6 FL (ref 80–99)
MONOCYTES # BLD: 0.4 K/UL (ref 0–1)
MONOCYTES NFR BLD: 14 % (ref 5–13)
NEUTS SEG # BLD: 1.4 K/UL (ref 1.8–8)
NEUTS SEG NFR BLD: 49 % (ref 32–75)
NRBC # BLD: 0 K/UL (ref 0–0.01)
NRBC BLD-RTO: 0 PER 100 WBC
PLATELET # BLD AUTO: 405 K/UL (ref 150–400)
PMV BLD AUTO: 8.7 FL (ref 8.9–12.9)
POTASSIUM SERPL-SCNC: 3.4 MMOL/L (ref 3.5–5.1)
PROT SERPL-MCNC: 7.8 G/DL (ref 6.4–8.2)
RBC # BLD AUTO: 3.65 M/UL (ref 3.8–5.2)
SODIUM SERPL-SCNC: 138 MMOL/L (ref 136–145)
WBC # BLD AUTO: 2.9 K/UL (ref 3.6–11)

## 2024-09-17 PROCEDURE — 85025 COMPLETE CBC W/AUTO DIFF WBC: CPT

## 2024-09-17 PROCEDURE — 3074F SYST BP LT 130 MM HG: CPT | Performed by: NURSE PRACTITIONER

## 2024-09-17 PROCEDURE — 83735 ASSAY OF MAGNESIUM: CPT

## 2024-09-17 PROCEDURE — 99215 OFFICE O/P EST HI 40 MIN: CPT | Performed by: INTERNAL MEDICINE

## 2024-09-17 PROCEDURE — 3078F DIAST BP <80 MM HG: CPT | Performed by: NURSE PRACTITIONER

## 2024-09-17 PROCEDURE — 36591 DRAW BLOOD OFF VENOUS DEVICE: CPT

## 2024-09-17 PROCEDURE — 99215 OFFICE O/P EST HI 40 MIN: CPT | Performed by: NURSE PRACTITIONER

## 2024-09-17 PROCEDURE — 80053 COMPREHEN METABOLIC PANEL: CPT

## 2024-09-17 RX ORDER — SODIUM CHLORIDE 9 MG/ML
INJECTION, SOLUTION INTRAVENOUS CONTINUOUS
OUTPATIENT
Start: 2024-09-17

## 2024-09-17 RX ORDER — EPINEPHRINE 1 MG/ML
0.3 INJECTION, SOLUTION, CONCENTRATE INTRAVENOUS PRN
OUTPATIENT
Start: 2024-09-17

## 2024-09-17 RX ORDER — ONDANSETRON 2 MG/ML
8 INJECTION INTRAMUSCULAR; INTRAVENOUS
OUTPATIENT
Start: 2024-09-17

## 2024-09-17 RX ORDER — ALBUTEROL SULFATE 90 UG/1
4 INHALANT RESPIRATORY (INHALATION) PRN
OUTPATIENT
Start: 2024-09-17

## 2024-09-17 RX ORDER — SODIUM CHLORIDE 9 MG/ML
5-250 INJECTION, SOLUTION INTRAVENOUS PRN
OUTPATIENT
Start: 2024-09-17

## 2024-09-17 RX ORDER — HEPARIN SODIUM (PORCINE) LOCK FLUSH IV SOLN 100 UNIT/ML 100 UNIT/ML
500 SOLUTION INTRAVENOUS PRN
OUTPATIENT
Start: 2024-09-17

## 2024-09-17 RX ORDER — FAMOTIDINE 10 MG/ML
20 INJECTION, SOLUTION INTRAVENOUS
OUTPATIENT
Start: 2024-09-17

## 2024-09-17 RX ORDER — ACETAMINOPHEN 325 MG/1
650 TABLET ORAL
OUTPATIENT
Start: 2024-09-17

## 2024-09-17 RX ORDER — DIPHENHYDRAMINE HYDROCHLORIDE 50 MG/ML
50 INJECTION INTRAMUSCULAR; INTRAVENOUS
OUTPATIENT
Start: 2024-09-17

## 2024-09-17 RX ORDER — DEXAMETHASONE 1 MG
1 TABLET ORAL
Qty: 30 TABLET | Refills: 5 | Status: SHIPPED | OUTPATIENT
Start: 2024-09-17

## 2024-09-17 RX ORDER — SODIUM CHLORIDE 0.9 % (FLUSH) 0.9 %
5-40 SYRINGE (ML) INJECTION PRN
OUTPATIENT
Start: 2024-09-17

## 2024-09-17 ASSESSMENT — PAIN SCALES - GENERAL: PAINLEVEL_OUTOF10: 0

## 2024-09-17 ASSESSMENT — PAIN SCALES - WONG BAKER: WONGBAKER_NUMERICALRESPONSE: NO HURT

## 2024-09-18 ENCOUNTER — HOSPITAL ENCOUNTER (OUTPATIENT)
Facility: HOSPITAL | Age: 64
Discharge: HOME | End: 2024-09-21
Payer: MEDICAID

## 2024-09-18 DIAGNOSIS — Z17.1 MALIGNANT NEOPLASM OF OVERLAPPING SITES OF RIGHT BREAST IN FEMALE, ESTROGEN RECEPTOR NEGATIVE (HCC): ICD-10-CM

## 2024-09-18 DIAGNOSIS — C50.911 CARCINOMA OF RIGHT BREAST METASTATIC TO BONE (HCC): ICD-10-CM

## 2024-09-18 DIAGNOSIS — C79.51 CARCINOMA OF RIGHT BREAST METASTATIC TO BONE (HCC): ICD-10-CM

## 2024-09-18 DIAGNOSIS — C78.00 BREAST CANCER METASTASIZED TO LUNG, RIGHT (HCC): ICD-10-CM

## 2024-09-18 DIAGNOSIS — C50.911 BREAST CANCER METASTASIZED TO LUNG, RIGHT (HCC): ICD-10-CM

## 2024-09-18 DIAGNOSIS — C78.7 CARCINOMA OF RIGHT BREAST METASTATIC TO LIVER (HCC): ICD-10-CM

## 2024-09-18 DIAGNOSIS — C50.911 CARCINOMA OF RIGHT BREAST METASTATIC TO LIVER (HCC): ICD-10-CM

## 2024-09-18 DIAGNOSIS — C50.811 MALIGNANT NEOPLASM OF OVERLAPPING SITES OF RIGHT BREAST IN FEMALE, ESTROGEN RECEPTOR NEGATIVE (HCC): ICD-10-CM

## 2024-09-18 PROCEDURE — 6360000004 HC RX CONTRAST MEDICATION: Performed by: NURSE PRACTITIONER

## 2024-09-18 PROCEDURE — 71260 CT THORAX DX C+: CPT

## 2024-09-18 RX ORDER — IOPAMIDOL 755 MG/ML
100 INJECTION, SOLUTION INTRAVASCULAR
Status: COMPLETED | OUTPATIENT
Start: 2024-09-18 | End: 2024-09-18

## 2024-09-18 RX ADMIN — IOPAMIDOL 100 ML: 755 INJECTION, SOLUTION INTRAVENOUS at 10:25

## 2024-09-24 ENCOUNTER — CLINICAL DOCUMENTATION (OUTPATIENT)
Facility: HOSPITAL | Age: 64
End: 2024-09-24

## 2024-09-24 ENCOUNTER — HOSPITAL ENCOUNTER (OUTPATIENT)
Facility: HOSPITAL | Age: 64
Setting detail: INFUSION SERIES
Discharge: HOME | End: 2024-09-24
Payer: MEDICAID

## 2024-09-24 ENCOUNTER — OFFICE VISIT (OUTPATIENT)
Age: 64
End: 2024-09-24
Payer: MEDICAID

## 2024-09-24 VITALS
BODY MASS INDEX: 23 KG/M2 | HEART RATE: 86 BPM | DIASTOLIC BLOOD PRESSURE: 80 MMHG | RESPIRATION RATE: 17 BRPM | TEMPERATURE: 98.3 F | WEIGHT: 125 LBS | HEIGHT: 62 IN | SYSTOLIC BLOOD PRESSURE: 137 MMHG | OXYGEN SATURATION: 98 %

## 2024-09-24 VITALS
SYSTOLIC BLOOD PRESSURE: 128 MMHG | RESPIRATION RATE: 17 BRPM | TEMPERATURE: 98.3 F | DIASTOLIC BLOOD PRESSURE: 75 MMHG | BODY MASS INDEX: 23.15 KG/M2 | HEART RATE: 85 BPM | WEIGHT: 125.8 LBS | HEIGHT: 62 IN | OXYGEN SATURATION: 98 %

## 2024-09-24 DIAGNOSIS — Z17.1 MALIGNANT NEOPLASM OF BREAST IN FEMALE, ESTROGEN RECEPTOR NEGATIVE, UNSPECIFIED LATERALITY, UNSPECIFIED SITE OF BREAST (HCC): ICD-10-CM

## 2024-09-24 DIAGNOSIS — C50.811 MALIGNANT NEOPLASM OF OVERLAPPING SITES OF RIGHT BREAST IN FEMALE, ESTROGEN RECEPTOR NEGATIVE (HCC): Primary | ICD-10-CM

## 2024-09-24 DIAGNOSIS — Z51.11 ENCOUNTER FOR ANTINEOPLASTIC CHEMOTHERAPY: ICD-10-CM

## 2024-09-24 DIAGNOSIS — C50.919 METASTASIS FROM BREAST CANCER (HCC): Primary | ICD-10-CM

## 2024-09-24 DIAGNOSIS — C50.919 CARCINOMA OF BREAST METASTATIC TO BONE, UNSPECIFIED LATERALITY (HCC): ICD-10-CM

## 2024-09-24 DIAGNOSIS — C79.51 CARCINOMA OF BREAST METASTATIC TO BONE, UNSPECIFIED LATERALITY (HCC): ICD-10-CM

## 2024-09-24 DIAGNOSIS — C50.919 MALIGNANT NEOPLASM OF BREAST IN FEMALE, ESTROGEN RECEPTOR NEGATIVE, UNSPECIFIED LATERALITY, UNSPECIFIED SITE OF BREAST (HCC): ICD-10-CM

## 2024-09-24 DIAGNOSIS — C79.9 METASTASIS FROM BREAST CANCER (HCC): Primary | ICD-10-CM

## 2024-09-24 DIAGNOSIS — Z17.1 MALIGNANT NEOPLASM OF OVERLAPPING SITES OF RIGHT BREAST IN FEMALE, ESTROGEN RECEPTOR NEGATIVE (HCC): Primary | ICD-10-CM

## 2024-09-24 LAB
ALBUMIN SERPL-MCNC: 2.9 G/DL (ref 3.5–5)
ALBUMIN/GLOB SERPL: 0.6 (ref 1.1–2.2)
ALP SERPL-CCNC: 72 U/L (ref 45–117)
ALT SERPL-CCNC: 12 U/L (ref 12–78)
ANION GAP SERPL CALC-SCNC: 7 MMOL/L (ref 2–12)
AST SERPL-CCNC: 37 U/L (ref 15–37)
BASOPHILS # BLD: 0.1 K/UL (ref 0–0.1)
BASOPHILS NFR BLD: 1 % (ref 0–1)
BILIRUB SERPL-MCNC: 0.4 MG/DL (ref 0.2–1)
BUN SERPL-MCNC: 14 MG/DL (ref 6–20)
BUN/CREAT SERPL: 20 (ref 12–20)
CALCIUM SERPL-MCNC: 9.2 MG/DL (ref 8.5–10.1)
CHLORIDE SERPL-SCNC: 103 MMOL/L (ref 97–108)
CO2 SERPL-SCNC: 28 MMOL/L (ref 21–32)
CREAT SERPL-MCNC: 0.71 MG/DL (ref 0.55–1.02)
DIFFERENTIAL METHOD BLD: ABNORMAL
EOSINOPHIL # BLD: 0 K/UL (ref 0–0.4)
EOSINOPHIL NFR BLD: 0 % (ref 0–7)
ERYTHROCYTE [DISTWIDTH] IN BLOOD BY AUTOMATED COUNT: 19.3 % (ref 11.5–14.5)
GLOBULIN SER CALC-MCNC: 5.2 G/DL (ref 2–4)
GLUCOSE SERPL-MCNC: 195 MG/DL (ref 65–100)
HCT VFR BLD AUTO: 33.4 % (ref 35–47)
HGB BLD-MCNC: 10 G/DL (ref 11.5–16)
IMM GRANULOCYTES # BLD AUTO: 0.1 K/UL (ref 0–0.04)
IMM GRANULOCYTES NFR BLD AUTO: 1 % (ref 0–0.5)
LYMPHOCYTES # BLD: 1.1 K/UL (ref 0.8–3.5)
LYMPHOCYTES NFR BLD: 9 % (ref 12–49)
MAGNESIUM SERPL-MCNC: 2 MG/DL (ref 1.6–2.4)
MCH RBC QN AUTO: 26.5 PG (ref 26–34)
MCHC RBC AUTO-ENTMCNC: 29.9 G/DL (ref 30–36.5)
MCV RBC AUTO: 88.4 FL (ref 80–99)
MONOCYTES # BLD: 0.6 K/UL (ref 0–1)
MONOCYTES NFR BLD: 5 % (ref 5–13)
NEUTS SEG # BLD: 10.4 K/UL (ref 1.8–8)
NEUTS SEG NFR BLD: 84 % (ref 32–75)
NRBC # BLD: 0 K/UL (ref 0–0.01)
NRBC BLD-RTO: 0 PER 100 WBC
PLATELET # BLD AUTO: 437 K/UL (ref 150–400)
PMV BLD AUTO: 8.5 FL (ref 8.9–12.9)
POTASSIUM SERPL-SCNC: 3.1 MMOL/L (ref 3.5–5.1)
PROT SERPL-MCNC: 8.1 G/DL (ref 6.4–8.2)
RBC # BLD AUTO: 3.78 M/UL (ref 3.8–5.2)
SODIUM SERPL-SCNC: 138 MMOL/L (ref 136–145)
WBC # BLD AUTO: 12.2 K/UL (ref 3.6–11)

## 2024-09-24 PROCEDURE — 80053 COMPREHEN METABOLIC PANEL: CPT

## 2024-09-24 PROCEDURE — 3075F SYST BP GE 130 - 139MM HG: CPT | Performed by: INTERNAL MEDICINE

## 2024-09-24 PROCEDURE — G2211 COMPLEX E/M VISIT ADD ON: HCPCS | Performed by: INTERNAL MEDICINE

## 2024-09-24 PROCEDURE — 99215 OFFICE O/P EST HI 40 MIN: CPT | Performed by: INTERNAL MEDICINE

## 2024-09-24 PROCEDURE — 6370000000 HC RX 637 (ALT 250 FOR IP): Performed by: NURSE PRACTITIONER

## 2024-09-24 PROCEDURE — 96365 THER/PROPH/DIAG IV INF INIT: CPT

## 2024-09-24 PROCEDURE — 6360000002 HC RX W HCPCS: Performed by: NURSE PRACTITIONER

## 2024-09-24 PROCEDURE — 3079F DIAST BP 80-89 MM HG: CPT | Performed by: INTERNAL MEDICINE

## 2024-09-24 PROCEDURE — 83735 ASSAY OF MAGNESIUM: CPT

## 2024-09-24 PROCEDURE — 85025 COMPLETE CBC W/AUTO DIFF WBC: CPT

## 2024-09-24 PROCEDURE — 2580000003 HC RX 258: Performed by: NURSE PRACTITIONER

## 2024-09-24 RX ORDER — ALBUTEROL SULFATE 90 UG/1
4 INHALANT RESPIRATORY (INHALATION) PRN
OUTPATIENT
Start: 2024-12-03

## 2024-09-24 RX ORDER — ONDANSETRON 2 MG/ML
8 INJECTION INTRAMUSCULAR; INTRAVENOUS
OUTPATIENT
Start: 2024-12-03

## 2024-09-24 RX ORDER — DIPHENHYDRAMINE HYDROCHLORIDE 50 MG/ML
50 INJECTION INTRAMUSCULAR; INTRAVENOUS
OUTPATIENT
Start: 2024-12-03

## 2024-09-24 RX ORDER — HEPARIN 100 UNIT/ML
500 SYRINGE INTRAVENOUS PRN
OUTPATIENT
Start: 2024-12-03

## 2024-09-24 RX ORDER — SODIUM CHLORIDE 0.9 % (FLUSH) 0.9 %
5-40 SYRINGE (ML) INJECTION PRN
OUTPATIENT
Start: 2024-12-03

## 2024-09-24 RX ORDER — ZOLEDRONIC ACID 0.04 MG/ML
4 INJECTION, SOLUTION INTRAVENOUS ONCE
Status: COMPLETED | OUTPATIENT
Start: 2024-09-24 | End: 2024-09-24

## 2024-09-24 RX ORDER — ZOLEDRONIC ACID 0.04 MG/ML
4 INJECTION, SOLUTION INTRAVENOUS ONCE
OUTPATIENT
Start: 2024-12-03 | End: 2024-12-03

## 2024-09-24 RX ORDER — SODIUM CHLORIDE 9 MG/ML
5-250 INJECTION, SOLUTION INTRAVENOUS PRN
OUTPATIENT
Start: 2024-12-03

## 2024-09-24 RX ORDER — ACETAMINOPHEN 325 MG/1
650 TABLET ORAL
OUTPATIENT
Start: 2024-12-03

## 2024-09-24 RX ORDER — EPINEPHRINE 1 MG/ML
0.3 INJECTION, SOLUTION INTRAMUSCULAR; SUBCUTANEOUS PRN
OUTPATIENT
Start: 2024-12-03

## 2024-09-24 RX ORDER — FAMOTIDINE 10 MG/ML
20 INJECTION, SOLUTION INTRAVENOUS
OUTPATIENT
Start: 2024-12-03

## 2024-09-24 RX ORDER — SODIUM CHLORIDE 9 MG/ML
5-250 INJECTION, SOLUTION INTRAVENOUS PRN
Status: DISCONTINUED | OUTPATIENT
Start: 2024-09-24 | End: 2024-09-25 | Stop reason: HOSPADM

## 2024-09-24 RX ORDER — SODIUM CHLORIDE 9 MG/ML
INJECTION, SOLUTION INTRAVENOUS CONTINUOUS
OUTPATIENT
Start: 2024-12-03

## 2024-09-24 RX ORDER — POTASSIUM CHLORIDE 750 MG/1
40 TABLET, EXTENDED RELEASE ORAL ONCE
Status: COMPLETED | OUTPATIENT
Start: 2024-09-24 | End: 2024-09-24

## 2024-09-24 RX ADMIN — SODIUM CHLORIDE 25 ML/HR: 9 INJECTION, SOLUTION INTRAVENOUS at 12:28

## 2024-09-24 RX ADMIN — ZOLEDRONIC ACID 4 MG: 0.04 INJECTION, SOLUTION INTRAVENOUS at 12:30

## 2024-09-24 RX ADMIN — POTASSIUM CHLORIDE 40 MEQ: 750 TABLET, FILM COATED, EXTENDED RELEASE ORAL at 12:26

## 2024-09-24 ASSESSMENT — PAIN SCALES - GENERAL: PAINLEVEL_OUTOF10: 0

## 2024-09-24 NOTE — PROGRESS NOTES
[facility] Chemo Progress Note    Date: September 24, 2024        Ms. Lobito Moser Arrived to [facility] for  Margetixumab/Eribulin/Zometa(chemo held) ambulatory in stable condition.  Assessment was completed and port accessed with positive blood return. Labs drawn and sent for processing. Went to provider appointment with Medical Oncology.    Returned from provider appointment. Labs reviewed. Criteria for treatment was met.      Pharmacy notified of held treatment.    Ms. Lobito Moser's vitals were reviewed.  Patient Vitals for the past 12 hrs:   Temp Pulse Resp BP SpO2   09/24/24 1245 -- 85 -- 128/75 --   09/24/24 1030 98.3 °F (36.8 °C) 86 17 137/80 98 %           Lab results were obtained and reviewed  Recent Results (from the past 12 hour(s))   Magnesium    Collection Time: 09/24/24 10:46 AM   Result Value Ref Range    Magnesium 2.0 1.6 - 2.4 mg/dL   Comprehensive metabolic panel    Collection Time: 09/24/24 10:46 AM   Result Value Ref Range    Sodium 138 136 - 145 mmol/L    Potassium 3.1 (L) 3.5 - 5.1 mmol/L    Chloride 103 97 - 108 mmol/L    CO2 28 21 - 32 mmol/L    Anion Gap 7 2 - 12 mmol/L    Glucose 195 (H) 65 - 100 mg/dL    BUN 14 6 - 20 MG/DL    Creatinine 0.71 0.55 - 1.02 MG/DL    BUN/Creatinine Ratio 20 12 - 20      Est, Glom Filt Rate >90 >60 ml/min/1.73m2    Calcium 9.2 8.5 - 10.1 MG/DL    Total Bilirubin 0.4 0.2 - 1.0 MG/DL    ALT 12 12 - 78 U/L    AST 37 15 - 37 U/L    Alk Phosphatase 72 45 - 117 U/L    Total Protein 8.1 6.4 - 8.2 g/dL    Albumin 2.9 (L) 3.5 - 5.0 g/dL    Globulin 5.2 (H) 2.0 - 4.0 g/dL    Albumin/Globulin Ratio 0.6 (L) 1.1 - 2.2     CBC With Auto Differential    Collection Time: 09/24/24 10:46 AM   Result Value Ref Range    WBC 12.2 (H) 3.6 - 11.0 K/uL    RBC 3.78 (L) 3.80 - 5.20 M/uL    Hemoglobin 10.0 (L) 11.5 - 16.0 g/dL    Hematocrit 33.4 (L) 35.0 - 47.0 %    MCV 88.4 80.0 - 99.0 FL    MCH 26.5 26.0 - 34.0 PG    MCHC 29.9 (L) 30.0 - 36.5 g/dL    RDW 19.3 (H) 11.5 - 14.5 %    Platelets    10/2/2024 11:00 AM Doyle Underwood MD ONCSF [facility]   10/3/2024  2:00 PM Abigail Desir MD NEUROWTCRSPB [facility]   10/9/2024  9:30 AM SS CHEMO CHAIR 11 [facility]   10/23/2024 10:30 AM SS CHEMO CHAIR 13 [facility]   10/30/2024 10:30 AM SS CHEMO CHAIR 13 [facility]   11/13/2024 10:30 AM SS CHEMO CHAIR 13 [facility]   11/20/2024 10:30 AM SS CHEMO CHAIR 13 [facility]   12/4/2024 10:30 AM SS CHEMO CHAIR 13 [facility]   12/11/2024 10:30 AM SS CHEMO CHAIR 13 [facility]   12/24/2024 10:30 [facility]         Blanka Banda RN  September 24, 2024

## 2024-09-25 DIAGNOSIS — C79.9 METASTASIS FROM BREAST CANCER (HCC): ICD-10-CM

## 2024-09-25 DIAGNOSIS — C79.51 CARCINOMA OF BREAST METASTATIC TO BONE, UNSPECIFIED LATERALITY (HCC): ICD-10-CM

## 2024-09-25 DIAGNOSIS — C50.919 METASTASIS FROM BREAST CANCER (HCC): ICD-10-CM

## 2024-09-25 DIAGNOSIS — C50.919 MALIGNANT NEOPLASM OF BREAST IN FEMALE, ESTROGEN RECEPTOR NEGATIVE, UNSPECIFIED LATERALITY, UNSPECIFIED SITE OF BREAST (HCC): ICD-10-CM

## 2024-09-25 DIAGNOSIS — Z17.1 MALIGNANT NEOPLASM OF BREAST IN FEMALE, ESTROGEN RECEPTOR NEGATIVE, UNSPECIFIED LATERALITY, UNSPECIFIED SITE OF BREAST (HCC): ICD-10-CM

## 2024-09-25 DIAGNOSIS — C50.919 CARCINOMA OF BREAST METASTATIC TO BONE, UNSPECIFIED LATERALITY (HCC): ICD-10-CM

## 2024-09-25 DIAGNOSIS — Z51.11 ENCOUNTER FOR ANTINEOPLASTIC CHEMOTHERAPY: Primary | ICD-10-CM

## 2024-09-25 RX ORDER — ONDANSETRON 2 MG/ML
8 INJECTION INTRAMUSCULAR; INTRAVENOUS
OUTPATIENT
Start: 2024-10-02

## 2024-09-25 RX ORDER — SODIUM CHLORIDE 9 MG/ML
5-250 INJECTION, SOLUTION INTRAVENOUS PRN
OUTPATIENT
Start: 2024-10-02

## 2024-09-25 RX ORDER — HEPARIN SODIUM (PORCINE) LOCK FLUSH IV SOLN 100 UNIT/ML 100 UNIT/ML
500 SOLUTION INTRAVENOUS PRN
OUTPATIENT
Start: 2024-10-09

## 2024-09-25 RX ORDER — MEPERIDINE HYDROCHLORIDE 50 MG/ML
12.5 INJECTION INTRAMUSCULAR; INTRAVENOUS; SUBCUTANEOUS PRN
OUTPATIENT
Start: 2024-10-09

## 2024-09-25 RX ORDER — DIPHENHYDRAMINE HYDROCHLORIDE 50 MG/ML
50 INJECTION INTRAMUSCULAR; INTRAVENOUS
OUTPATIENT
Start: 2024-10-09

## 2024-09-25 RX ORDER — ONDANSETRON 2 MG/ML
8 INJECTION INTRAMUSCULAR; INTRAVENOUS
OUTPATIENT
Start: 2024-10-09

## 2024-09-25 RX ORDER — SODIUM CHLORIDE 9 MG/ML
5-250 INJECTION, SOLUTION INTRAVENOUS PRN
OUTPATIENT
Start: 2024-10-09

## 2024-09-25 RX ORDER — ALBUTEROL SULFATE 90 UG/1
4 INHALANT RESPIRATORY (INHALATION) PRN
OUTPATIENT
Start: 2024-10-02

## 2024-09-25 RX ORDER — PROCHLORPERAZINE EDISYLATE 5 MG/ML
10 INJECTION INTRAMUSCULAR; INTRAVENOUS EVERY 6 HOURS PRN
OUTPATIENT
Start: 2024-10-09

## 2024-09-25 RX ORDER — PROCHLORPERAZINE EDISYLATE 5 MG/ML
10 INJECTION INTRAMUSCULAR; INTRAVENOUS EVERY 6 HOURS PRN
OUTPATIENT
Start: 2024-10-02

## 2024-09-25 RX ORDER — ACETAMINOPHEN 325 MG/1
650 TABLET ORAL
OUTPATIENT
Start: 2024-10-09

## 2024-09-25 RX ORDER — DIPHENHYDRAMINE HYDROCHLORIDE 50 MG/ML
50 INJECTION INTRAMUSCULAR; INTRAVENOUS
OUTPATIENT
Start: 2024-10-02

## 2024-09-25 RX ORDER — ACETAMINOPHEN 325 MG/1
650 TABLET ORAL
OUTPATIENT
Start: 2024-10-02

## 2024-09-25 RX ORDER — SODIUM CHLORIDE 9 MG/ML
INJECTION, SOLUTION INTRAVENOUS CONTINUOUS
OUTPATIENT
Start: 2024-10-02

## 2024-09-25 RX ORDER — FAMOTIDINE 10 MG/ML
20 INJECTION, SOLUTION INTRAVENOUS
OUTPATIENT
Start: 2024-10-02

## 2024-09-25 RX ORDER — EPINEPHRINE 1 MG/ML
0.3 INJECTION, SOLUTION, CONCENTRATE INTRAVENOUS PRN
OUTPATIENT
Start: 2024-10-02

## 2024-09-25 RX ORDER — SODIUM CHLORIDE 9 MG/ML
INJECTION, SOLUTION INTRAVENOUS CONTINUOUS
OUTPATIENT
Start: 2024-10-09

## 2024-09-25 RX ORDER — FAMOTIDINE 10 MG/ML
20 INJECTION, SOLUTION INTRAVENOUS
OUTPATIENT
Start: 2024-10-09

## 2024-09-25 RX ORDER — SODIUM CHLORIDE 0.9 % (FLUSH) 0.9 %
5-40 SYRINGE (ML) INJECTION PRN
OUTPATIENT
Start: 2024-10-02

## 2024-09-25 RX ORDER — SODIUM CHLORIDE 0.9 % (FLUSH) 0.9 %
5-40 SYRINGE (ML) INJECTION PRN
OUTPATIENT
Start: 2024-10-09

## 2024-09-25 RX ORDER — EPINEPHRINE 1 MG/ML
0.3 INJECTION, SOLUTION, CONCENTRATE INTRAVENOUS PRN
OUTPATIENT
Start: 2024-10-09

## 2024-09-25 RX ORDER — HEPARIN SODIUM (PORCINE) LOCK FLUSH IV SOLN 100 UNIT/ML 100 UNIT/ML
500 SOLUTION INTRAVENOUS PRN
OUTPATIENT
Start: 2024-10-02

## 2024-09-25 RX ORDER — MEPERIDINE HYDROCHLORIDE 50 MG/ML
12.5 INJECTION INTRAMUSCULAR; INTRAVENOUS; SUBCUTANEOUS PRN
OUTPATIENT
Start: 2024-10-02

## 2024-09-25 RX ORDER — ALBUTEROL SULFATE 90 UG/1
4 INHALANT RESPIRATORY (INHALATION) PRN
OUTPATIENT
Start: 2024-10-09

## 2024-10-02 ENCOUNTER — HOSPITAL ENCOUNTER (OUTPATIENT)
Facility: HOSPITAL | Age: 64
Setting detail: INFUSION SERIES
Discharge: HOME | End: 2024-10-02
Payer: MEDICAID

## 2024-10-02 ENCOUNTER — OFFICE VISIT (OUTPATIENT)
Age: 64
End: 2024-10-02
Payer: MEDICAID

## 2024-10-02 VITALS
WEIGHT: 125.2 LBS | OXYGEN SATURATION: 95 % | SYSTOLIC BLOOD PRESSURE: 137 MMHG | HEIGHT: 62 IN | DIASTOLIC BLOOD PRESSURE: 72 MMHG | RESPIRATION RATE: 18 BRPM | BODY MASS INDEX: 23.04 KG/M2 | HEART RATE: 85 BPM | TEMPERATURE: 98.7 F

## 2024-10-02 VITALS
HEIGHT: 62 IN | DIASTOLIC BLOOD PRESSURE: 89 MMHG | WEIGHT: 125.2 LBS | HEART RATE: 88 BPM | BODY MASS INDEX: 23.04 KG/M2 | SYSTOLIC BLOOD PRESSURE: 126 MMHG | OXYGEN SATURATION: 95 % | TEMPERATURE: 98.4 F | RESPIRATION RATE: 18 BRPM

## 2024-10-02 DIAGNOSIS — C50.811 MALIGNANT NEOPLASM OF OVERLAPPING SITES OF RIGHT BREAST IN FEMALE, ESTROGEN RECEPTOR NEGATIVE (HCC): Primary | ICD-10-CM

## 2024-10-02 DIAGNOSIS — Z51.11 ENCOUNTER FOR ANTINEOPLASTIC CHEMOTHERAPY: ICD-10-CM

## 2024-10-02 DIAGNOSIS — C50.919 METASTASIS FROM BREAST CANCER (HCC): Primary | ICD-10-CM

## 2024-10-02 DIAGNOSIS — Z17.1 MALIGNANT NEOPLASM OF OVERLAPPING SITES OF RIGHT BREAST IN FEMALE, ESTROGEN RECEPTOR NEGATIVE (HCC): Primary | ICD-10-CM

## 2024-10-02 DIAGNOSIS — C50.919 MALIGNANT NEOPLASM OF BREAST IN FEMALE, ESTROGEN RECEPTOR NEGATIVE, UNSPECIFIED LATERALITY, UNSPECIFIED SITE OF BREAST (HCC): ICD-10-CM

## 2024-10-02 DIAGNOSIS — C50.919 CARCINOMA OF BREAST METASTATIC TO BONE, UNSPECIFIED LATERALITY (HCC): ICD-10-CM

## 2024-10-02 DIAGNOSIS — C79.9 METASTASIS FROM BREAST CANCER (HCC): Primary | ICD-10-CM

## 2024-10-02 DIAGNOSIS — Z17.1 MALIGNANT NEOPLASM OF BREAST IN FEMALE, ESTROGEN RECEPTOR NEGATIVE, UNSPECIFIED LATERALITY, UNSPECIFIED SITE OF BREAST (HCC): ICD-10-CM

## 2024-10-02 DIAGNOSIS — C79.51 CARCINOMA OF BREAST METASTATIC TO BONE, UNSPECIFIED LATERALITY (HCC): ICD-10-CM

## 2024-10-02 LAB
ALBUMIN SERPL-MCNC: 3 G/DL (ref 3.5–5)
ALBUMIN/GLOB SERPL: 0.5 (ref 1.1–2.2)
ALP SERPL-CCNC: 80 U/L (ref 45–117)
ALT SERPL-CCNC: 11 U/L (ref 12–78)
ANION GAP SERPL CALC-SCNC: 5 MMOL/L (ref 2–12)
AST SERPL-CCNC: 44 U/L (ref 15–37)
BASOPHILS # BLD: 0.1 K/UL (ref 0–0.1)
BASOPHILS NFR BLD: 1 % (ref 0–1)
BILIRUB SERPL-MCNC: 0.4 MG/DL (ref 0.2–1)
BUN SERPL-MCNC: 15 MG/DL (ref 6–20)
BUN/CREAT SERPL: 23 (ref 12–20)
CALCIUM SERPL-MCNC: 9.4 MG/DL (ref 8.5–10.1)
CHLORIDE SERPL-SCNC: 104 MMOL/L (ref 97–108)
CO2 SERPL-SCNC: 29 MMOL/L (ref 21–32)
CREAT SERPL-MCNC: 0.64 MG/DL (ref 0.55–1.02)
DIFFERENTIAL METHOD BLD: ABNORMAL
EOSINOPHIL # BLD: 0 K/UL (ref 0–0.4)
EOSINOPHIL NFR BLD: 0 % (ref 0–7)
ERYTHROCYTE [DISTWIDTH] IN BLOOD BY AUTOMATED COUNT: 19.9 % (ref 11.5–14.5)
GLOBULIN SER CALC-MCNC: 5.6 G/DL (ref 2–4)
GLUCOSE SERPL-MCNC: 213 MG/DL (ref 65–100)
HCT VFR BLD AUTO: 34.1 % (ref 35–47)
HGB BLD-MCNC: 10.2 G/DL (ref 11.5–16)
IMM GRANULOCYTES # BLD AUTO: 0.1 K/UL (ref 0–0.04)
IMM GRANULOCYTES NFR BLD AUTO: 1 % (ref 0–0.5)
LYMPHOCYTES # BLD: 1.1 K/UL (ref 0.8–3.5)
LYMPHOCYTES NFR BLD: 8 % (ref 12–49)
MCH RBC QN AUTO: 26.2 PG (ref 26–34)
MCHC RBC AUTO-ENTMCNC: 29.9 G/DL (ref 30–36.5)
MCV RBC AUTO: 87.4 FL (ref 80–99)
MONOCYTES # BLD: 0.3 K/UL (ref 0–1)
MONOCYTES NFR BLD: 3 % (ref 5–13)
NEUTS SEG # BLD: 11.5 K/UL (ref 1.8–8)
NEUTS SEG NFR BLD: 87 % (ref 32–75)
NRBC # BLD: 0 K/UL (ref 0–0.01)
NRBC BLD-RTO: 0 PER 100 WBC
PLATELET # BLD AUTO: 366 K/UL (ref 150–400)
PMV BLD AUTO: 8.4 FL (ref 8.9–12.9)
POTASSIUM SERPL-SCNC: 3.6 MMOL/L (ref 3.5–5.1)
PROT SERPL-MCNC: 8.6 G/DL (ref 6.4–8.2)
RBC # BLD AUTO: 3.9 M/UL (ref 3.8–5.2)
SODIUM SERPL-SCNC: 138 MMOL/L (ref 136–145)
WBC # BLD AUTO: 13 K/UL (ref 3.6–11)

## 2024-10-02 PROCEDURE — 99215 OFFICE O/P EST HI 40 MIN: CPT | Performed by: INTERNAL MEDICINE

## 2024-10-02 PROCEDURE — 96413 CHEMO IV INFUSION 1 HR: CPT

## 2024-10-02 PROCEDURE — 85025 COMPLETE CBC W/AUTO DIFF WBC: CPT

## 2024-10-02 PROCEDURE — 96411 CHEMO IV PUSH ADDL DRUG: CPT

## 2024-10-02 PROCEDURE — 80053 COMPREHEN METABOLIC PANEL: CPT

## 2024-10-02 PROCEDURE — 96415 CHEMO IV INFUSION ADDL HR: CPT

## 2024-10-02 PROCEDURE — 2580000003 HC RX 258: Performed by: INTERNAL MEDICINE

## 2024-10-02 PROCEDURE — 96409 CHEMO IV PUSH SNGL DRUG: CPT

## 2024-10-02 PROCEDURE — 6360000002 HC RX W HCPCS: Performed by: INTERNAL MEDICINE

## 2024-10-02 RX ORDER — ONDANSETRON 2 MG/ML
8 INJECTION INTRAMUSCULAR; INTRAVENOUS
Status: DISCONTINUED | OUTPATIENT
Start: 2024-10-02 | End: 2024-10-03 | Stop reason: HOSPADM

## 2024-10-02 RX ORDER — ACETAMINOPHEN 325 MG/1
650 TABLET ORAL
Status: DISCONTINUED | OUTPATIENT
Start: 2024-10-02 | End: 2024-10-03 | Stop reason: HOSPADM

## 2024-10-02 RX ORDER — MEPERIDINE HYDROCHLORIDE 25 MG/ML
12.5 INJECTION INTRAMUSCULAR; INTRAVENOUS; SUBCUTANEOUS PRN
Status: DISCONTINUED | OUTPATIENT
Start: 2024-10-02 | End: 2024-10-03 | Stop reason: HOSPADM

## 2024-10-02 RX ORDER — SODIUM CHLORIDE 9 MG/ML
INJECTION, SOLUTION INTRAVENOUS CONTINUOUS
Status: DISCONTINUED | OUTPATIENT
Start: 2024-10-02 | End: 2024-10-03 | Stop reason: HOSPADM

## 2024-10-02 RX ORDER — SODIUM CHLORIDE 9 MG/ML
5-250 INJECTION, SOLUTION INTRAVENOUS PRN
Status: DISCONTINUED | OUTPATIENT
Start: 2024-10-02 | End: 2024-10-03 | Stop reason: HOSPADM

## 2024-10-02 RX ORDER — DIPHENHYDRAMINE HYDROCHLORIDE 50 MG/ML
50 INJECTION INTRAMUSCULAR; INTRAVENOUS
Status: DISCONTINUED | OUTPATIENT
Start: 2024-10-02 | End: 2024-10-03 | Stop reason: HOSPADM

## 2024-10-02 RX ORDER — SODIUM CHLORIDE 0.9 % (FLUSH) 0.9 %
5-40 SYRINGE (ML) INJECTION PRN
Status: DISCONTINUED | OUTPATIENT
Start: 2024-10-02 | End: 2024-10-03 | Stop reason: HOSPADM

## 2024-10-02 RX ORDER — ALBUTEROL SULFATE 90 UG/1
4 INHALANT RESPIRATORY (INHALATION) PRN
Status: DISCONTINUED | OUTPATIENT
Start: 2024-10-02 | End: 2024-10-03 | Stop reason: HOSPADM

## 2024-10-02 RX ORDER — FAMOTIDINE 10 MG/ML
20 INJECTION, SOLUTION INTRAVENOUS
Status: DISCONTINUED | OUTPATIENT
Start: 2024-10-02 | End: 2024-10-03 | Stop reason: HOSPADM

## 2024-10-02 RX ORDER — EPINEPHRINE 1 MG/ML
0.3 INJECTION, SOLUTION INTRAMUSCULAR; SUBCUTANEOUS PRN
Status: DISCONTINUED | OUTPATIENT
Start: 2024-10-02 | End: 2024-10-03 | Stop reason: HOSPADM

## 2024-10-02 RX ADMIN — VINORELBINE 39 MG: 10 INJECTION, SOLUTION INTRAVENOUS at 13:29

## 2024-10-02 RX ADMIN — SODIUM CHLORIDE 50 ML/HR: 9 INJECTION, SOLUTION INTRAVENOUS at 12:30

## 2024-10-02 RX ADMIN — SODIUM CHLORIDE 462 MG: 9 INJECTION, SOLUTION INTRAVENOUS at 14:04

## 2024-10-02 RX ADMIN — SODIUM CHLORIDE, PRESERVATIVE FREE 20 ML: 5 INJECTION INTRAVENOUS at 15:57

## 2024-10-02 ASSESSMENT — PAIN SCALES - GENERAL: PAINLEVEL_OUTOF10: 8

## 2024-10-02 ASSESSMENT — PAIN DESCRIPTION - LOCATION: LOCATION: NECK

## 2024-10-02 ASSESSMENT — PAIN DESCRIPTION - DESCRIPTORS: DESCRIPTORS: ACHING

## 2024-10-02 NOTE — PROGRESS NOTES
Date: October 2, 2024        Ms. Lobito Moser Arrived to [facility] for  C1 D1 Vinorelbine and Trazimera ambulatory in stable condition.  Assessment was completed with the assistance of virtual  and port accessed by Jerilyn RAY. Labs drawn and sent for processing. Went to provider appointment with Medical Oncology.    Returned from provider appointment. Labs reviewed. Criteria for treatment was met.    Ms. Lobito Moser's vitals were reviewed.  Patient Vitals for the past 12 hrs:   Temp Pulse Resp BP SpO2   10/02/24 1557 98.7 °F (37.1 °C) 85 -- 137/72 --   10/02/24 1045 98.4 °F (36.9 °C) 88 18 126/89 95 %         Lab results were obtained and reviewed.  Recent Results (from the past 12 hour(s))   Comprehensive metabolic panel    Collection Time: 10/02/24 10:50 AM   Result Value Ref Range    Sodium 138 136 - 145 mmol/L    Potassium 3.6 3.5 - 5.1 mmol/L    Chloride 104 97 - 108 mmol/L    CO2 29 21 - 32 mmol/L    Anion Gap 5 2 - 12 mmol/L    Glucose 213 (H) 65 - 100 mg/dL    BUN 15 6 - 20 MG/DL    Creatinine 0.64 0.55 - 1.02 MG/DL    BUN/Creatinine Ratio 23 (H) 12 - 20      Est, Glom Filt Rate >90 >60 ml/min/1.73m2    Calcium 9.4 8.5 - 10.1 MG/DL    Total Bilirubin 0.4 0.2 - 1.0 MG/DL    ALT 11 (L) 12 - 78 U/L    AST 44 (H) 15 - 37 U/L    Alk Phosphatase 80 45 - 117 U/L    Total Protein 8.6 (H) 6.4 - 8.2 g/dL    Albumin 3.0 (L) 3.5 - 5.0 g/dL    Globulin 5.6 (H) 2.0 - 4.0 g/dL    Albumin/Globulin Ratio 0.5 (L) 1.1 - 2.2     CBC With Auto Differential    Collection Time: 10/02/24 10:50 AM   Result Value Ref Range    WBC 13.0 (H) 3.6 - 11.0 K/uL    RBC 3.90 3.80 - 5.20 M/uL    Hemoglobin 10.2 (L) 11.5 - 16.0 g/dL    Hematocrit 34.1 (L) 35.0 - 47.0 %    MCV 87.4 80.0 - 99.0 FL    MCH 26.2 26.0 - 34.0 PG    MCHC 29.9 (L) 30.0 - 36.5 g/dL    RDW 19.9 (H) 11.5 - 14.5 %    Platelets 366 150 - 400 K/uL    MPV 8.4 (L) 8.9 - 12.9 FL    Nucleated RBCs 0.0 0  WBC    nRBC 0.00 0.00 - 0.01 K/uL    Neutrophils %

## 2024-10-02 NOTE — PROGRESS NOTES
Chief Complaint   Patient presents with    Follow-up           Vitals:    10/02/24 1141   BP: 126/89   Pulse: 88   Resp: 18   Temp: 98.4 °F (36.9 °C)   SpO2: 95%            1. Have you been to the ER, urgent care clinic since your last visit?  Hospitalized since your last visit?  No  2. Have you seen or consulted any other health care providers outside of the [facility] since your last visit?  Include any pap smears or colon screening. No    
excellent support and is coping well with her disease    4. DM2:  was on metformin, no longer taking    5. Right Shoulder pain:  on celebrex; she has started PT.    6.Cough/RLL pneumonia:                                                                                                                                                                                        She underwent bronchoscopy 11/16/23 with cultures negative at this point. Restarted 40 mg prednisone daily and planned to repeat high res CT which was not scheduled. Decreased to 30mg prednisone 12/12/23. Cough improved per patient. Decreased to 20mg daily 1/2/24. decreased to 10mg daily, 1/23/24. Decreased to 10 mg every other day 2/13/24. Discontinued 3/12/24.     She then continued with mild cough that was likely due to cancer progression, improved with tessalon 100mg TID prn. She reports she had improvement with levofloxacin but after completion she had worsening.     Right pleural effusion.  Thoracentesis 6/14/24 with cytology + for metastatic breast cancer, ER/PA -, HER2 positive by IHC.     7/1/24 right thoracentesis:  250 mL removed    7. HTN: stopped amlodipine, normal bp today    8. AST elevation:  mild, due to cancer and therapy    9. Hypokalemia:  will replete in opic prn    10. L posterior parietal CVA:  referred to neurology, sees them in oct    11. Nausea: PRN compazine and ondansetron    12. Mouth sores: Viscous lidocaine ordered previously, none present today.     13. Right upper arm edema: venous doppler negative 6/4/24    14. Acute RUL PE:  7/2/24, on eliquis indefinitely.     15. Constipation:  colace    16. Weight monitoring:  loss of appetite, weight 125 lbs, weight stable. Taking dexamethasone 1 mg PO daily       [phone], fani alexander, son    I appreciate the opportunity to participate in Ms. Debra Alexander's care.    Signed By: Doyle Underwood MD      No follow-ups on file.

## 2024-10-08 ENCOUNTER — APPOINTMENT (OUTPATIENT)
Facility: HOSPITAL | Age: 64
End: 2024-10-08
Payer: MEDICAID

## 2024-10-09 ENCOUNTER — HOSPITAL ENCOUNTER (OUTPATIENT)
Facility: HOSPITAL | Age: 64
Setting detail: INFUSION SERIES
Discharge: HOME | End: 2024-10-09
Payer: MEDICAID

## 2024-10-09 ENCOUNTER — OFFICE VISIT (OUTPATIENT)
Age: 64
End: 2024-10-09
Payer: MEDICAID

## 2024-10-09 VITALS
RESPIRATION RATE: 18 BRPM | TEMPERATURE: 98.2 F | HEART RATE: 85 BPM | BODY MASS INDEX: 23.04 KG/M2 | DIASTOLIC BLOOD PRESSURE: 74 MMHG | HEIGHT: 62 IN | SYSTOLIC BLOOD PRESSURE: 124 MMHG | WEIGHT: 125.2 LBS | OXYGEN SATURATION: 95 %

## 2024-10-09 VITALS
OXYGEN SATURATION: 95 % | HEART RATE: 94 BPM | RESPIRATION RATE: 18 BRPM | BODY MASS INDEX: 23.04 KG/M2 | TEMPERATURE: 98.2 F | SYSTOLIC BLOOD PRESSURE: 138 MMHG | DIASTOLIC BLOOD PRESSURE: 76 MMHG | WEIGHT: 125.2 LBS | HEIGHT: 62 IN

## 2024-10-09 DIAGNOSIS — C50.919 MALIGNANT NEOPLASM OF BREAST IN FEMALE, ESTROGEN RECEPTOR NEGATIVE, UNSPECIFIED LATERALITY, UNSPECIFIED SITE OF BREAST (HCC): ICD-10-CM

## 2024-10-09 DIAGNOSIS — C50.811 MALIGNANT NEOPLASM OF OVERLAPPING SITES OF RIGHT BREAST IN FEMALE, ESTROGEN RECEPTOR NEGATIVE (HCC): ICD-10-CM

## 2024-10-09 DIAGNOSIS — Z17.1 MALIGNANT NEOPLASM OF OVERLAPPING SITES OF RIGHT BREAST IN FEMALE, ESTROGEN RECEPTOR NEGATIVE (HCC): ICD-10-CM

## 2024-10-09 DIAGNOSIS — C79.9 METASTASIS FROM BREAST CANCER (HCC): Primary | ICD-10-CM

## 2024-10-09 DIAGNOSIS — Z51.11 ENCOUNTER FOR ANTINEOPLASTIC CHEMOTHERAPY: ICD-10-CM

## 2024-10-09 DIAGNOSIS — C79.51 CARCINOMA OF BREAST METASTATIC TO BONE, UNSPECIFIED LATERALITY (HCC): ICD-10-CM

## 2024-10-09 DIAGNOSIS — C50.919 CARCINOMA OF BREAST METASTATIC TO BONE, UNSPECIFIED LATERALITY (HCC): ICD-10-CM

## 2024-10-09 DIAGNOSIS — C78.00 BREAST CANCER METASTASIZED TO LUNG, RIGHT (HCC): ICD-10-CM

## 2024-10-09 DIAGNOSIS — C50.919 METASTASIS FROM BREAST CANCER (HCC): Primary | ICD-10-CM

## 2024-10-09 DIAGNOSIS — R64 CANCER CACHEXIA (HCC): ICD-10-CM

## 2024-10-09 DIAGNOSIS — C50.911 BREAST CANCER METASTASIZED TO LUNG, RIGHT (HCC): ICD-10-CM

## 2024-10-09 DIAGNOSIS — Z86.711 HISTORY OF PULMONARY EMBOLISM: Primary | ICD-10-CM

## 2024-10-09 DIAGNOSIS — Z17.1 MALIGNANT NEOPLASM OF BREAST IN FEMALE, ESTROGEN RECEPTOR NEGATIVE, UNSPECIFIED LATERALITY, UNSPECIFIED SITE OF BREAST (HCC): ICD-10-CM

## 2024-10-09 LAB
BASOPHILS # BLD: 0.1 K/UL (ref 0–0.1)
BASOPHILS NFR BLD: 1 % (ref 0–1)
DIFFERENTIAL METHOD BLD: ABNORMAL
EOSINOPHIL # BLD: 0 K/UL (ref 0–0.4)
EOSINOPHIL NFR BLD: 0 % (ref 0–7)
ERYTHROCYTE [DISTWIDTH] IN BLOOD BY AUTOMATED COUNT: 20 % (ref 11.5–14.5)
HCT VFR BLD AUTO: 32 % (ref 35–47)
HGB BLD-MCNC: 9.7 G/DL (ref 11.5–16)
IMM GRANULOCYTES # BLD AUTO: 0.1 K/UL (ref 0–0.04)
IMM GRANULOCYTES NFR BLD AUTO: 1 % (ref 0–0.5)
LYMPHOCYTES # BLD: 0.9 K/UL (ref 0.8–3.5)
LYMPHOCYTES NFR BLD: 10 % (ref 12–49)
MCH RBC QN AUTO: 26.4 PG (ref 26–34)
MCHC RBC AUTO-ENTMCNC: 30.3 G/DL (ref 30–36.5)
MCV RBC AUTO: 87.2 FL (ref 80–99)
MONOCYTES # BLD: 0.3 K/UL (ref 0–1)
MONOCYTES NFR BLD: 3 % (ref 5–13)
NEUTS SEG # BLD: 7.1 K/UL (ref 1.8–8)
NEUTS SEG NFR BLD: 85 % (ref 32–75)
NRBC # BLD: 0 K/UL (ref 0–0.01)
NRBC BLD-RTO: 0 PER 100 WBC
PLATELET # BLD AUTO: 349 K/UL (ref 150–400)
PMV BLD AUTO: 8.7 FL (ref 8.9–12.9)
RBC # BLD AUTO: 3.67 M/UL (ref 3.8–5.2)
WBC # BLD AUTO: 8.5 K/UL (ref 3.6–11)

## 2024-10-09 PROCEDURE — 85025 COMPLETE CBC W/AUTO DIFF WBC: CPT

## 2024-10-09 PROCEDURE — 99215 OFFICE O/P EST HI 40 MIN: CPT | Performed by: INTERNAL MEDICINE

## 2024-10-09 PROCEDURE — 96409 CHEMO IV PUSH SNGL DRUG: CPT

## 2024-10-09 PROCEDURE — 2580000003 HC RX 258: Performed by: INTERNAL MEDICINE

## 2024-10-09 PROCEDURE — 36415 COLL VENOUS BLD VENIPUNCTURE: CPT

## 2024-10-09 PROCEDURE — 6360000002 HC RX W HCPCS: Performed by: INTERNAL MEDICINE

## 2024-10-09 RX ORDER — SODIUM CHLORIDE 9 MG/ML
5-250 INJECTION, SOLUTION INTRAVENOUS PRN
Status: DISCONTINUED | OUTPATIENT
Start: 2024-10-09 | End: 2024-10-10 | Stop reason: HOSPADM

## 2024-10-09 RX ORDER — SODIUM CHLORIDE 0.9 % (FLUSH) 0.9 %
5-40 SYRINGE (ML) INJECTION PRN
Status: DISCONTINUED | OUTPATIENT
Start: 2024-10-09 | End: 2024-10-10 | Stop reason: HOSPADM

## 2024-10-09 RX ADMIN — SODIUM CHLORIDE 25 ML/HR: 9 INJECTION, SOLUTION INTRAVENOUS at 13:32

## 2024-10-09 RX ADMIN — SODIUM CHLORIDE, PRESERVATIVE FREE 20 ML: 5 INJECTION INTRAVENOUS at 13:55

## 2024-10-09 RX ADMIN — VINORELBINE 39 MG: 10 INJECTION, SOLUTION INTRAVENOUS at 13:35

## 2024-10-09 ASSESSMENT — PAIN SCALES - WONG BAKER: WONGBAKER_NUMERICALRESPONSE: NO HURT

## 2024-10-09 ASSESSMENT — PAIN SCALES - GENERAL: PAINLEVEL_OUTOF10: 0

## 2024-10-09 NOTE — PROGRESS NOTES
Debra MIGUEL Robin Moser is a 64 y.o. female Follow up for Breast Cancer.    1. Have you been to the ER, urgent care clinic since your last visit?  Hospitalized since your last visit?No    2. Have you seen or consulted any other health care providers outside of the [facility] since your last visit?  Include any pap smears or colon screening. No     10/9/2024  10:14 AM   Vitals    SYSTOLIC 138    DIASTOLIC 76    Site    Position    Pulse 94    Temp 98.2 °F (36.8 °C)    Respirations 18    SpO2 95 %    Weight - Scale    Height    Body Mass Index    Pain Score    Pain Loc    Pain Level

## 2024-10-09 NOTE — PROGRESS NOTES
[facility] [place] at [facility]  [address], [address] [place] [facility] [address]  W: [phone]  F: [phone]      Reason for Visit:   Debra Moser is a 64 y.o. female who has transferred to me from [place].    Treatment History:   2016:  Original right stage 3 breast cancer, s/p mastectomy with 5 cm IDC, 3/19 LN, ER negative, AR negative, HER 2 +, treated with chemotherapy (unknown) and XRT (in [facility])  9/2018:  recurrent breast cancer to her neck, tx with docetaxel q 3 weeks x 9, completed 4/12/2019 (PD) ([facility])  6/25/19 L ax LN FNA:  poorly differentiated carcinoma, c/w breast cancer, ER negative, AR negative, HER 2 positive at IHC 3+  Tempus testing:  HER 2 overexpression  7/26/19 Trastuzumab/pertuzumab/paclitaxel until 7/2021 after 32 cycles (PD) ([place], [place])  XRT to right neck 1/2021  T-DM1 8/10/21- T-Dxd  T-Dxd 7/2022- 9/26/23   Hold 9/26/23 due to gr 1 ILD - discontinued due to continued concern for chronic ILD on CT  Right shoulder dislocation and infection 11/17/22  Zometa 9/5/2023 - current  Bronch, BAL, malignant cells present, compatible with carcinoma, not able to do receptors  Trastuzumab 11/21/23 - 1/23/24 (PD)  Tucatinib 11/13/23 - 2/13/24 (PD)  Capecitabine 11/23/23 - 2/13/24 (PD)  T-Dxd 2/20/24- 6/4/24 (PD)  Margetuximab + eribulin 6/18/24- 9/6/24 (PD)  Right thoracentesis 6/14/24 1500 mL removed - + for metastatic carcinoma, ER/AR -, HER2 + by IHC 3+  9/17/24 right neck mass, bx:  metastatic breast cancer, ER negative, AR negative, HER 2 + at IHC 3+, ki67 40%; left neck biopsy:  metastatic breast cancer, ER negative, AR negative, HER 2 positive at IHC 3+  Vinorelbine and trastuzumab 10/2/24- current    History of Present Illness:   Recently moved to [place] live with daughter.    Interval Hx: Patient presents today for follow up and treatment.  She reports gr 1 fatigue, gr 1 hair loss, gr 1 cough, gr 1 dyspnea, gr 2 itching, gr 1 skin rash, gr 1

## 2024-10-09 NOTE — PROGRESS NOTES
[facility] - Chemotherapy Progress Note    Pt admit to [facility] for C1D8 Navelbine in stable condition. Assessment completed.  PAC with positive blood return. Labs were drawn and sent for processing. Pt proceeded to scheduled medical oncology appt.    [employer] utilized during this visit.   [id number]    No new concerns voiced.  Pt reported to assessment nurse that her R neck incision is itching, but not painful.   She also reports a recent runny nose.     VS  Vitals:    10/09/24 1000 10/09/24 1014 10/09/24 1349   BP:  138/76 124/74   Pulse:  94 85   Resp:  18    Temp:  98.2 °F (36.8 °C)    SpO2:  95%    Weight: 56.8 kg (125 lb 3.2 oz)     Height: 1.575 m (5' 2\")           Flushed with 75cc NS at conclusion per instructions on MAR.    Medications:  Medications Administered         0.9 % sodium chloride infusion Admin Date  10/09/2024 Action  New Bag Dose  25 mL/hr Rate  25 mL/hr Route  IntraVENous Documented By  Cinthia Larkin RN        sodium chloride flush 0.9 % injection 5-40 mL Admin Date  10/09/2024 Action  Given Dose  20 mL Rate   Route  IntraVENous Documented By  Cinthia Larkin RN        vinORELbine (NAVELBINE) 39 mg in sodium chloride 0.9 % 50 mL chemo IVPB Admin Date  10/09/2024 Action  New Bag Dose  39 mg Rate  353.4 mL/hr Route  IntraVENous Documented By  Cinthia Larkin RN              Two nurses verified prior to administering:  drug name, drug dose, infusion volume or drug volume when prepared in a syringe, rate of administration, route of administration, expiration dates and/or times, appearance and physical integrity of the drugs, rate set on infusion pump, when used, sequencing of drug administration    Pt tolerated treatment well. PAC maintained positive blood return throughout treatment, flushed with positive blood return at conclusion. D/c home in no distress. Pt aware of next appointment scheduled for 10/23.    Labs  Recent

## 2024-10-16 RX ORDER — HEPARIN 100 UNIT/ML
500 SYRINGE INTRAVENOUS PRN
OUTPATIENT
Start: 2024-10-23

## 2024-10-16 RX ORDER — SODIUM CHLORIDE 9 MG/ML
INJECTION, SOLUTION INTRAVENOUS CONTINUOUS
OUTPATIENT
Start: 2024-10-23

## 2024-10-16 RX ORDER — ONDANSETRON 2 MG/ML
8 INJECTION INTRAMUSCULAR; INTRAVENOUS
OUTPATIENT
Start: 2024-10-23

## 2024-10-16 RX ORDER — ACETAMINOPHEN 325 MG/1
650 TABLET ORAL
OUTPATIENT
Start: 2024-10-23

## 2024-10-16 RX ORDER — PROCHLORPERAZINE EDISYLATE 5 MG/ML
10 INJECTION INTRAMUSCULAR; INTRAVENOUS EVERY 6 HOURS PRN
OUTPATIENT
Start: 2024-10-23

## 2024-10-16 RX ORDER — SODIUM CHLORIDE 9 MG/ML
5-250 INJECTION, SOLUTION INTRAVENOUS PRN
OUTPATIENT
Start: 2024-10-23

## 2024-10-16 RX ORDER — MEPERIDINE HYDROCHLORIDE 25 MG/ML
12.5 INJECTION INTRAMUSCULAR; INTRAVENOUS; SUBCUTANEOUS PRN
OUTPATIENT
Start: 2024-10-23

## 2024-10-16 RX ORDER — ALBUTEROL SULFATE 90 UG/1
4 INHALANT RESPIRATORY (INHALATION) PRN
OUTPATIENT
Start: 2024-10-23

## 2024-10-16 RX ORDER — EPINEPHRINE 1 MG/ML
0.3 INJECTION, SOLUTION INTRAMUSCULAR; SUBCUTANEOUS PRN
OUTPATIENT
Start: 2024-10-23

## 2024-10-16 RX ORDER — FAMOTIDINE 10 MG/ML
20 INJECTION, SOLUTION INTRAVENOUS
OUTPATIENT
Start: 2024-10-23

## 2024-10-16 RX ORDER — DIPHENHYDRAMINE HYDROCHLORIDE 50 MG/ML
50 INJECTION INTRAMUSCULAR; INTRAVENOUS
OUTPATIENT
Start: 2024-10-23

## 2024-10-17 ENCOUNTER — HOSPITAL ENCOUNTER (INPATIENT)
Facility: HOSPITAL | Age: 64
LOS: 5 days | Discharge: HOME | DRG: 720 | End: 2024-10-22
Attending: STUDENT IN AN ORGANIZED HEALTH CARE EDUCATION/TRAINING PROGRAM | Admitting: STUDENT IN AN ORGANIZED HEALTH CARE EDUCATION/TRAINING PROGRAM
Payer: MEDICAID

## 2024-10-17 ENCOUNTER — APPOINTMENT (OUTPATIENT)
Facility: HOSPITAL | Age: 64
DRG: 720 | End: 2024-10-17
Payer: MEDICAID

## 2024-10-17 DIAGNOSIS — C78.01 MALIGNANT NEOPLASM METASTATIC TO BOTH LUNGS (HCC): ICD-10-CM

## 2024-10-17 DIAGNOSIS — C78.02 MALIGNANT NEOPLASM METASTATIC TO BOTH LUNGS (HCC): ICD-10-CM

## 2024-10-17 DIAGNOSIS — R09.02 HYPOXEMIA: ICD-10-CM

## 2024-10-17 DIAGNOSIS — J18.9 MULTIFOCAL PNEUMONIA: ICD-10-CM

## 2024-10-17 DIAGNOSIS — A41.9 SEVERE SEPSIS (HCC): Primary | ICD-10-CM

## 2024-10-17 DIAGNOSIS — R65.20 SEVERE SEPSIS (HCC): Primary | ICD-10-CM

## 2024-10-17 PROBLEM — J96.01 ACUTE HYPOXIC RESPIRATORY FAILURE: Status: ACTIVE | Noted: 2024-10-17

## 2024-10-17 LAB
ALBUMIN SERPL-MCNC: 2.8 G/DL (ref 3.5–5)
ALBUMIN/GLOB SERPL: 0.5 (ref 1.1–2.2)
ALP SERPL-CCNC: 88 U/L (ref 45–117)
ALT SERPL-CCNC: 14 U/L (ref 12–78)
ANION GAP SERPL CALC-SCNC: 8 MMOL/L (ref 2–12)
AST SERPL-CCNC: 43 U/L (ref 15–37)
BASOPHILS # BLD: 0.1 K/UL (ref 0–0.1)
BASOPHILS NFR BLD: 2 % (ref 0–1)
BILIRUB SERPL-MCNC: 0.4 MG/DL (ref 0.2–1)
BUN SERPL-MCNC: 15 MG/DL (ref 6–20)
BUN/CREAT SERPL: 19 (ref 12–20)
CALCIUM SERPL-MCNC: 8.8 MG/DL (ref 8.5–10.1)
CHLORIDE SERPL-SCNC: 102 MMOL/L (ref 97–108)
CO2 SERPL-SCNC: 25 MMOL/L (ref 21–32)
COMMENT:: NORMAL
CREAT SERPL-MCNC: 0.8 MG/DL (ref 0.55–1.02)
DIFFERENTIAL METHOD BLD: ABNORMAL
EOSINOPHIL # BLD: 0 K/UL (ref 0–0.4)
EOSINOPHIL NFR BLD: 1 % (ref 0–7)
ERYTHROCYTE [DISTWIDTH] IN BLOOD BY AUTOMATED COUNT: 21.1 % (ref 11.5–14.5)
FLUAV RNA SPEC QL NAA+PROBE: NOT DETECTED
FLUBV RNA SPEC QL NAA+PROBE: NOT DETECTED
GLOBULIN SER CALC-MCNC: 5.2 G/DL (ref 2–4)
GLUCOSE BLD STRIP.AUTO-MCNC: 156 MG/DL (ref 65–117)
GLUCOSE BLD STRIP.AUTO-MCNC: 162 MG/DL (ref 65–117)
GLUCOSE SERPL-MCNC: 279 MG/DL (ref 65–100)
HCT VFR BLD AUTO: 30.6 % (ref 35–47)
HGB BLD-MCNC: 9.1 G/DL (ref 11.5–16)
IMM GRANULOCYTES # BLD AUTO: 0 K/UL (ref 0–0.04)
IMM GRANULOCYTES NFR BLD AUTO: 0 % (ref 0–0.5)
LACTATE BLD-SCNC: 1.83 MMOL/L (ref 0.4–2)
LACTATE SERPL-SCNC: 2.1 MMOL/L (ref 0.4–2)
LYMPHOCYTES # BLD: 0.6 K/UL (ref 0.8–3.5)
LYMPHOCYTES NFR BLD: 20 % (ref 12–49)
MCH RBC QN AUTO: 25.9 PG (ref 26–34)
MCHC RBC AUTO-ENTMCNC: 29.7 G/DL (ref 30–36.5)
MCV RBC AUTO: 87.2 FL (ref 80–99)
MONOCYTES # BLD: 0.2 K/UL (ref 0–1)
MONOCYTES NFR BLD: 5 % (ref 5–13)
NEUTS BAND NFR BLD MANUAL: 21 %
NEUTS SEG # BLD: 2.2 K/UL (ref 1.8–8)
NEUTS SEG NFR BLD: 51 % (ref 32–75)
NRBC # BLD: 0 K/UL (ref 0–0.01)
NRBC BLD-RTO: 0 PER 100 WBC
NT PRO BNP: 39 PG/ML
PLATELET # BLD AUTO: 373 K/UL (ref 150–400)
PMV BLD AUTO: 8.8 FL (ref 8.9–12.9)
POTASSIUM SERPL-SCNC: 3.6 MMOL/L (ref 3.5–5.1)
PROCALCITONIN SERPL-MCNC: 0.07 NG/ML
PROT SERPL-MCNC: 8 G/DL (ref 6.4–8.2)
RBC # BLD AUTO: 3.51 M/UL (ref 3.8–5.2)
RBC MORPH BLD: ABNORMAL
SARS-COV-2 RNA RESP QL NAA+PROBE: NOT DETECTED
SERVICE CMNT-IMP: ABNORMAL
SERVICE CMNT-IMP: ABNORMAL
SODIUM SERPL-SCNC: 135 MMOL/L (ref 136–145)
SOURCE: NORMAL
SPECIMEN HOLD: NORMAL
TROPONIN I SERPL HS-MCNC: 4 NG/L (ref 0–51)
WBC # BLD AUTO: 3.1 K/UL (ref 3.6–11)

## 2024-10-17 PROCEDURE — 87205 SMEAR GRAM STAIN: CPT

## 2024-10-17 PROCEDURE — 99285 EMERGENCY DEPT VISIT HI MDM: CPT

## 2024-10-17 PROCEDURE — 85025 COMPLETE CBC W/AUTO DIFF WBC: CPT

## 2024-10-17 PROCEDURE — 84484 ASSAY OF TROPONIN QUANT: CPT

## 2024-10-17 PROCEDURE — 83880 ASSAY OF NATRIURETIC PEPTIDE: CPT

## 2024-10-17 PROCEDURE — 87449 NOS EACH ORGANISM AG IA: CPT

## 2024-10-17 PROCEDURE — 87185 SC STD ENZYME DETCJ PER NZM: CPT

## 2024-10-17 PROCEDURE — 82962 GLUCOSE BLOOD TEST: CPT

## 2024-10-17 PROCEDURE — 87070 CULTURE OTHR SPECIMN AEROBIC: CPT

## 2024-10-17 PROCEDURE — 6360000004 HC RX CONTRAST MEDICATION: Performed by: STUDENT IN AN ORGANIZED HEALTH CARE EDUCATION/TRAINING PROGRAM

## 2024-10-17 PROCEDURE — 87040 BLOOD CULTURE FOR BACTERIA: CPT

## 2024-10-17 PROCEDURE — 2580000003 HC RX 258: Performed by: STUDENT IN AN ORGANIZED HEALTH CARE EDUCATION/TRAINING PROGRAM

## 2024-10-17 PROCEDURE — 83605 ASSAY OF LACTIC ACID: CPT

## 2024-10-17 PROCEDURE — 84145 PROCALCITONIN (PCT): CPT

## 2024-10-17 PROCEDURE — 6360000002 HC RX W HCPCS: Performed by: STUDENT IN AN ORGANIZED HEALTH CARE EDUCATION/TRAINING PROGRAM

## 2024-10-17 PROCEDURE — 87186 SC STD MICRODIL/AGAR DIL: CPT

## 2024-10-17 PROCEDURE — 87077 CULTURE AEROBIC IDENTIFY: CPT

## 2024-10-17 PROCEDURE — 2700000000 HC OXYGEN THERAPY PER DAY

## 2024-10-17 PROCEDURE — 36415 COLL VENOUS BLD VENIPUNCTURE: CPT

## 2024-10-17 PROCEDURE — 2060000000 HC ICU INTERMEDIATE R&B

## 2024-10-17 PROCEDURE — 6370000000 HC RX 637 (ALT 250 FOR IP): Performed by: STUDENT IN AN ORGANIZED HEALTH CARE EDUCATION/TRAINING PROGRAM

## 2024-10-17 PROCEDURE — 80053 COMPREHEN METABOLIC PANEL: CPT

## 2024-10-17 PROCEDURE — 71275 CT ANGIOGRAPHY CHEST: CPT

## 2024-10-17 PROCEDURE — 94761 N-INVAS EAR/PLS OXIMETRY MLT: CPT

## 2024-10-17 PROCEDURE — 87636 SARSCOV2 & INF A&B AMP PRB: CPT

## 2024-10-17 PROCEDURE — 93005 ELECTROCARDIOGRAM TRACING: CPT | Performed by: STUDENT IN AN ORGANIZED HEALTH CARE EDUCATION/TRAINING PROGRAM

## 2024-10-17 RX ORDER — SODIUM CHLORIDE 0.9 % (FLUSH) 0.9 %
5-40 SYRINGE (ML) INJECTION PRN
Status: DISCONTINUED | OUTPATIENT
Start: 2024-10-17 | End: 2024-10-22 | Stop reason: HOSPADM

## 2024-10-17 RX ORDER — INSULIN LISPRO 100 [IU]/ML
0-4 INJECTION, SOLUTION INTRAVENOUS; SUBCUTANEOUS
Status: DISCONTINUED | OUTPATIENT
Start: 2024-10-17 | End: 2024-10-22 | Stop reason: HOSPADM

## 2024-10-17 RX ORDER — ACETAMINOPHEN 325 MG/1
650 TABLET ORAL EVERY 6 HOURS PRN
Status: DISCONTINUED | OUTPATIENT
Start: 2024-10-17 | End: 2024-10-22 | Stop reason: HOSPADM

## 2024-10-17 RX ORDER — POTASSIUM CHLORIDE 750 MG/1
40 TABLET, EXTENDED RELEASE ORAL PRN
Status: DISCONTINUED | OUTPATIENT
Start: 2024-10-17 | End: 2024-10-22 | Stop reason: HOSPADM

## 2024-10-17 RX ORDER — GUAIFENESIN 600 MG/1
600 TABLET, EXTENDED RELEASE ORAL 2 TIMES DAILY
Status: DISCONTINUED | OUTPATIENT
Start: 2024-10-17 | End: 2024-10-22 | Stop reason: HOSPADM

## 2024-10-17 RX ORDER — ACETAMINOPHEN 650 MG/1
650 SUPPOSITORY RECTAL EVERY 6 HOURS PRN
Status: DISCONTINUED | OUTPATIENT
Start: 2024-10-17 | End: 2024-10-22 | Stop reason: HOSPADM

## 2024-10-17 RX ORDER — SODIUM CHLORIDE 0.9 % (FLUSH) 0.9 %
5-40 SYRINGE (ML) INJECTION EVERY 12 HOURS SCHEDULED
Status: DISCONTINUED | OUTPATIENT
Start: 2024-10-17 | End: 2024-10-22 | Stop reason: HOSPADM

## 2024-10-17 RX ORDER — ONDANSETRON 4 MG/1
4 TABLET, ORALLY DISINTEGRATING ORAL EVERY 8 HOURS PRN
Status: DISCONTINUED | OUTPATIENT
Start: 2024-10-17 | End: 2024-10-22 | Stop reason: HOSPADM

## 2024-10-17 RX ORDER — IPRATROPIUM BROMIDE AND ALBUTEROL SULFATE 2.5; .5 MG/3ML; MG/3ML
1 SOLUTION RESPIRATORY (INHALATION) EVERY 4 HOURS PRN
Status: DISCONTINUED | OUTPATIENT
Start: 2024-10-17 | End: 2024-10-22 | Stop reason: HOSPADM

## 2024-10-17 RX ORDER — POLYETHYLENE GLYCOL 3350 17 G/17G
17 POWDER, FOR SOLUTION ORAL DAILY PRN
Status: DISCONTINUED | OUTPATIENT
Start: 2024-10-17 | End: 2024-10-22 | Stop reason: HOSPADM

## 2024-10-17 RX ORDER — ONDANSETRON 2 MG/ML
4 INJECTION INTRAMUSCULAR; INTRAVENOUS EVERY 6 HOURS PRN
Status: DISCONTINUED | OUTPATIENT
Start: 2024-10-17 | End: 2024-10-22 | Stop reason: HOSPADM

## 2024-10-17 RX ORDER — SODIUM CHLORIDE, SODIUM LACTATE, POTASSIUM CHLORIDE, CALCIUM CHLORIDE 600; 310; 30; 20 MG/100ML; MG/100ML; MG/100ML; MG/100ML
INJECTION, SOLUTION INTRAVENOUS CONTINUOUS
Status: DISPENSED | OUTPATIENT
Start: 2024-10-17 | End: 2024-10-18

## 2024-10-17 RX ORDER — SODIUM CHLORIDE, SODIUM LACTATE, POTASSIUM CHLORIDE, AND CALCIUM CHLORIDE .6; .31; .03; .02 G/100ML; G/100ML; G/100ML; G/100ML
30 INJECTION, SOLUTION INTRAVENOUS ONCE
Status: COMPLETED | OUTPATIENT
Start: 2024-10-17 | End: 2024-10-17

## 2024-10-17 RX ORDER — IOPAMIDOL 755 MG/ML
100 INJECTION, SOLUTION INTRAVASCULAR
Status: COMPLETED | OUTPATIENT
Start: 2024-10-17 | End: 2024-10-17

## 2024-10-17 RX ORDER — BENZONATATE 100 MG/1
100 CAPSULE ORAL 3 TIMES DAILY PRN
Status: DISCONTINUED | OUTPATIENT
Start: 2024-10-17 | End: 2024-10-22 | Stop reason: HOSPADM

## 2024-10-17 RX ORDER — MAGNESIUM SULFATE IN WATER 40 MG/ML
2000 INJECTION, SOLUTION INTRAVENOUS PRN
Status: DISCONTINUED | OUTPATIENT
Start: 2024-10-17 | End: 2024-10-22 | Stop reason: HOSPADM

## 2024-10-17 RX ORDER — DEXAMETHASONE 0.5 MG/1
1 TABLET ORAL DAILY
Status: DISCONTINUED | OUTPATIENT
Start: 2024-10-18 | End: 2024-10-22 | Stop reason: HOSPADM

## 2024-10-17 RX ORDER — SODIUM CHLORIDE 9 MG/ML
INJECTION, SOLUTION INTRAVENOUS PRN
Status: DISCONTINUED | OUTPATIENT
Start: 2024-10-17 | End: 2024-10-22 | Stop reason: HOSPADM

## 2024-10-17 RX ORDER — POTASSIUM CHLORIDE 7.45 MG/ML
10 INJECTION INTRAVENOUS PRN
Status: DISCONTINUED | OUTPATIENT
Start: 2024-10-17 | End: 2024-10-22 | Stop reason: HOSPADM

## 2024-10-17 RX ORDER — DEXTROSE MONOHYDRATE 100 MG/ML
INJECTION, SOLUTION INTRAVENOUS CONTINUOUS PRN
Status: DISCONTINUED | OUTPATIENT
Start: 2024-10-17 | End: 2024-10-22 | Stop reason: HOSPADM

## 2024-10-17 RX ADMIN — SODIUM CHLORIDE, POTASSIUM CHLORIDE, SODIUM LACTATE AND CALCIUM CHLORIDE: 600; 310; 30; 20 INJECTION, SOLUTION INTRAVENOUS at 15:08

## 2024-10-17 RX ADMIN — SODIUM CHLORIDE, POTASSIUM CHLORIDE, SODIUM LACTATE AND CALCIUM CHLORIDE 1503 ML: 600; 310; 30; 20 INJECTION, SOLUTION INTRAVENOUS at 12:32

## 2024-10-17 RX ADMIN — AZITHROMYCIN DIHYDRATE 500 MG: 500 INJECTION, POWDER, LYOPHILIZED, FOR SOLUTION INTRAVENOUS at 12:23

## 2024-10-17 RX ADMIN — SODIUM CHLORIDE, POTASSIUM CHLORIDE, SODIUM LACTATE AND CALCIUM CHLORIDE: 600; 310; 30; 20 INJECTION, SOLUTION INTRAVENOUS at 22:39

## 2024-10-17 RX ADMIN — GUAIFENESIN 600 MG: 600 TABLET ORAL at 20:07

## 2024-10-17 RX ADMIN — APIXABAN 5 MG: 5 TABLET, FILM COATED ORAL at 20:06

## 2024-10-17 RX ADMIN — CEFTRIAXONE 1000 MG: 1 INJECTION, POWDER, FOR SOLUTION INTRAMUSCULAR; INTRAVENOUS at 12:20

## 2024-10-17 RX ADMIN — SODIUM CHLORIDE, PRESERVATIVE FREE 10 ML: 5 INJECTION INTRAVENOUS at 20:08

## 2024-10-17 RX ADMIN — ACETAMINOPHEN 650 MG: 325 TABLET ORAL at 22:44

## 2024-10-17 RX ADMIN — GUAIFENESIN 600 MG: 600 TABLET ORAL at 14:56

## 2024-10-17 RX ADMIN — IOPAMIDOL 37 ML: 755 INJECTION, SOLUTION INTRAVENOUS at 11:13

## 2024-10-17 ASSESSMENT — PAIN DESCRIPTION - ORIENTATION: ORIENTATION: MID

## 2024-10-17 ASSESSMENT — PAIN SCALES - GENERAL: PAINLEVEL_OUTOF10: 5

## 2024-10-17 ASSESSMENT — PAIN DESCRIPTION - DESCRIPTORS: DESCRIPTORS: ACHING

## 2024-10-17 ASSESSMENT — PAIN DESCRIPTION - LOCATION: LOCATION: HEAD

## 2024-10-17 NOTE — ED NOTES
TRANSFER - OUT REPORT:    Verbal report given to CORY Julien on Debra Moser  being transferred to [facility] for routine progression of patient care       Report consisted of patient's Situation, Background, Assessment and   Recommendations(SBAR).     Information from the following report(s) Nurse Handoff Report, ED Encounter Summary, ED SBAR, MAR, Recent Results, and Cardiac Rhythm NSR  was reviewed with the receiving nurse.    [place] Assessment:    Presents to emergency department  because of falls (Syncope, seizure, or loss of consciousness): No  Age > 70: No  Altered Mental Status, Intoxication with alcohol or substance confusion (Disorientation, impaired judgment, poor safety awaremess, or inability to follow instructions): No  Impaired Mobility: Ambulates or transfers with assistive devices or assistance; Unable to ambulate or transer.: No  Nursing Judgement: Yes          Lines:   Implantable Port Left Subclavian (Active)       Peripheral IV 10/17/24 Left Antecubital (Active)        Opportunity for questions and clarification was provided.      Patient transported with:  Monitor, 4L NC O2, tech

## 2024-10-17 NOTE — H&P
Worsening large left lower lobe dependent consolidation. Worsening patchy consolidative and groundglass opacities in the bilateral lower lobes. Slightly worsening large partial collapse/consolidation in the inferior right upper lobe. Slightly worsening reticulonodular opacities throughout remaining right upper  lobe. New consolidative opacity in the anterior inferior left lower lobe.      Available records were reviewed at the time of H&P.        Past Medical History:   Diagnosis Date    DM type 2 causing vascular disease (HCC)     Hx of completed stroke     L posterior parietal    HX: breast cancer     Hypertension     Metastasis from breast cancer (HCC)     liver, lung, bone, lymph nodes      Past Surgical History:   Procedure Laterality Date    BRONCHOSCOPY N/A 11/16/2023    BRONCHOSCOPY performed by Flower Esteves MD at [facility] ENDOSCOPY    BRONCHOSCOPY  11/16/2023    BRONCHOSCOPY ALVEOLAR LAVAGE performed by Flower Esteves MD at [facility] ENDOSCOPY    CT NEEDLE BIOPSY SPINE  7/31/2019    CT NEEDLE BIOPSY SPINE 7/31/2019    HYSTERECTOMY (CERVIX STATUS UNKNOWN)  2012    MASTECTOMY Right     MEDICATION INJECTION N/A 11/16/2023    INJECTION MEDICATION performed by Flower Esteves MD at [facility] ENDOSCOPY     Social History     Tobacco Use    Smoking status: Never    Smokeless tobacco: Never   Substance Use Topics    Alcohol use: Not Currently      No family history on file.     No Known Allergies     Prior to Admission medications    Medication Sig Start Date End Date Taking? Authorizing Provider   apixaban (ELIQUIS) 5 MG TABS tablet Take 1 tablet by mouth 2 times daily 10/9/24   Rochelle Florian APRN - NP   dexAMETHasone (DECADRON) 1 MG tablet Take 1 tablet by mouth daily (with breakfast) 9/17/24   Rochelle Florian APRN - NP   cephALEXin (KEFLEX) 500 MG capsule Take 1 capsule by mouth 4 times daily 9/6/24   Doyle Underwood MD   OLANZapine (ZYPREXA) 2.5 MG tablet Take 1 tablet by mouth nightly for 10 days following each

## 2024-10-17 NOTE — ED TRIAGE NOTES
Patient arrives to the ED for complaints of shortness of breath which started yesterday. Also reports \"lung pain\" and sore throat with persistent cough.     Patient is 89% on room air in triage. Patient placed on 4L NC O2. Patient SpO2 improved to 94%.    Patient is currently getting chemo for breast cancer.     PMH Diabetes, HTN,

## 2024-10-17 NOTE — ED PROVIDER NOTES
[facility] EMERGENCY DEPT  EMERGENCY DEPARTMENT ENCOUNTER      Pt Name: Debra Moser  MRN: [id number]  Birthdate 1960  Date of evaluation: 10/17/2024  Provider: Elida Esposito DO    CHIEF COMPLAINT       Chief Complaint   Patient presents with    Shortness of Breath       PMH   Past Medical History:   Diagnosis Date    DM type 2 causing vascular disease (HCC)     Hx of completed stroke     L posterior parietal    HX: breast cancer     Hypertension     Metastasis from breast cancer (HCC)     liver, lung, bone, lymph nodes         MDM:   Vitals:    Vitals:    10/17/24 1045   BP: (!) 112/55   Pulse: 89   Resp: 23   Temp:    SpO2: 97%           This is a 64 y.o. female with pmhx breast cancer on palliative chemo with multiple mets, CVA, T2DM, HTN who presents today for cc of KELTON. Patient is Icelandic speaking only,  utilized for the entirety of the interaction. She states that she has had a cough for about a year, but yesterday noticed things got worse. She feels like she can't breathe or talk well, she is 8 days post chemo for active breast cancer. She denies fever, productive cough, chest pain, abdominal pain, nausea or vomiting. Does not take blood thinners. She feels the breathing gets worse when she tries to walk.     On arrival VS includes hypoxia to 89% on RA, 94% on 4L NC, mild tachycardia, otherwise stable.   Physical Exam  General: Alert, no acute distress  HEENT: Normocephalic, atraumatic. EOMI, moist oral mucosa, no conjunctival injection  Neck: ROM normal, supple  Cardio: Heart tachycardic rate and regular rhythm, cap refill <2seconds  Lungs: No respiratory distress, no wheezing, CTAB  Abdomen: Soft, nontender  MSK: ROM normal, no LE edema    Skin: Warm, dry, no rash  Neuro: No focal neurodeficits, Aox3    Patient improved on 4 L of nasal cannula after initial hypoxic reading.  Labs otherwise notable for mild elevation lactic acid at 2.1, WBC low at 3.1.  Does meet 2 SIRS criteria

## 2024-10-17 NOTE — ED NOTES
Verbal report given to CORY Julien(name) on Debra Moser being transferred to [facility](unit) for routine progression of care    Report consisted of patient's Situation, Background, Assessment and Recommendations (SBAR)    Information from the following report(s) SBAR, ED Summary, Intake/Output, MAR, Recent Results, and Cardiac Rhythm NSR  was reviewed with the receiving nurse.    Opportunity for questions and clarification was provided.    Patient transported with: Monitor, O2 @ 4lpm, and Tech    Last Filed Values:  Temp: 98.3 °F (36.8 °C)  Pulse: 93  Respirations: 10  SpO2: 95 %  BP: 104/65      Recent Labs     10/17/24  0942   WBC 3.1*       Repeat LA: Time Due post-fluid resuscitation    Blood Cultures Drawn: Yes  12:17 pm    Fluid Resuscitation:  Total given 1,503ml (still infusing).  Reassessment after fluids still going  Remains Hypotensive  No    All Antibiotics Started: Yes    VS x 2 post-fluid resuscitation:  No    Vasopressor Infusion: No  Vasopressor-time initiated N/A    Additional Interventions/Comments: pt has 503ml due    Tejal Hubbard RN

## 2024-10-18 DIAGNOSIS — R64 CANCER CACHEXIA (HCC): ICD-10-CM

## 2024-10-18 PROBLEM — E11.9 DM (DIABETES MELLITUS), TYPE 2 (HCC): Status: ACTIVE | Noted: 2024-10-18

## 2024-10-18 PROBLEM — C50.919 BREAST CANCER METASTASIZED TO MULTIPLE SITES (HCC): Status: ACTIVE | Noted: 2024-10-18

## 2024-10-18 PROBLEM — J18.9 MULTIFOCAL PNEUMONIA: Status: ACTIVE | Noted: 2024-10-18

## 2024-10-18 LAB
ALBUMIN SERPL-MCNC: 2.5 G/DL (ref 3.5–5)
ALBUMIN/GLOB SERPL: 0.5 (ref 1.1–2.2)
ALP SERPL-CCNC: 79 U/L (ref 45–117)
ALT SERPL-CCNC: 11 U/L (ref 12–78)
ANION GAP SERPL CALC-SCNC: 3 MMOL/L (ref 2–12)
AST SERPL-CCNC: 44 U/L (ref 15–37)
BASOPHILS # BLD: 0 K/UL (ref 0–0.1)
BASOPHILS NFR BLD: 1 % (ref 0–1)
BILIRUB DIRECT SERPL-MCNC: 0.1 MG/DL (ref 0–0.2)
BILIRUB SERPL-MCNC: 0.4 MG/DL (ref 0.2–1)
BUN SERPL-MCNC: 12 MG/DL (ref 6–20)
BUN/CREAT SERPL: 21 (ref 12–20)
CALCIUM SERPL-MCNC: 9 MG/DL (ref 8.5–10.1)
CHLORIDE SERPL-SCNC: 107 MMOL/L (ref 97–108)
CO2 SERPL-SCNC: 29 MMOL/L (ref 21–32)
CREAT SERPL-MCNC: 0.57 MG/DL (ref 0.55–1.02)
DIFFERENTIAL METHOD BLD: ABNORMAL
EOSINOPHIL # BLD: 0 K/UL (ref 0–0.4)
EOSINOPHIL NFR BLD: 0 % (ref 0–7)
ERYTHROCYTE [DISTWIDTH] IN BLOOD BY AUTOMATED COUNT: 20.9 % (ref 11.5–14.5)
EST. AVERAGE GLUCOSE BLD GHB EST-MCNC: 146 MG/DL
GLOBULIN SER CALC-MCNC: 5 G/DL (ref 2–4)
GLUCOSE BLD STRIP.AUTO-MCNC: 112 MG/DL (ref 65–117)
GLUCOSE BLD STRIP.AUTO-MCNC: 154 MG/DL (ref 65–117)
GLUCOSE BLD STRIP.AUTO-MCNC: 185 MG/DL (ref 65–117)
GLUCOSE BLD STRIP.AUTO-MCNC: 189 MG/DL (ref 65–117)
GLUCOSE SERPL-MCNC: 125 MG/DL (ref 65–100)
HBA1C MFR BLD: 6.7 % (ref 4–5.6)
HCT VFR BLD AUTO: 29.9 % (ref 35–47)
HGB BLD-MCNC: 8.8 G/DL (ref 11.5–16)
IMM GRANULOCYTES # BLD AUTO: 0 K/UL (ref 0–0.04)
IMM GRANULOCYTES NFR BLD AUTO: 0 % (ref 0–0.5)
LYMPHOCYTES # BLD: 1.2 K/UL (ref 0.8–3.5)
LYMPHOCYTES NFR BLD: 36 % (ref 12–49)
MCH RBC QN AUTO: 26.1 PG (ref 26–34)
MCHC RBC AUTO-ENTMCNC: 29.4 G/DL (ref 30–36.5)
MCV RBC AUTO: 88.7 FL (ref 80–99)
MONOCYTES # BLD: 0.3 K/UL (ref 0–1)
MONOCYTES NFR BLD: 9 % (ref 5–13)
NEUTS SEG # BLD: 1.7 K/UL (ref 1.8–8)
NEUTS SEG NFR BLD: 54 % (ref 32–75)
NRBC # BLD: 0 K/UL (ref 0–0.01)
NRBC BLD-RTO: 0 PER 100 WBC
PLATELET # BLD AUTO: 341 K/UL (ref 150–400)
PMV BLD AUTO: 8.7 FL (ref 8.9–12.9)
POTASSIUM SERPL-SCNC: 4 MMOL/L (ref 3.5–5.1)
PROT SERPL-MCNC: 7.5 G/DL (ref 6.4–8.2)
RBC # BLD AUTO: 3.37 M/UL (ref 3.8–5.2)
RBC MORPH BLD: ABNORMAL
SERVICE CMNT-IMP: ABNORMAL
SERVICE CMNT-IMP: NORMAL
SODIUM SERPL-SCNC: 139 MMOL/L (ref 136–145)
WBC # BLD AUTO: 3.2 K/UL (ref 3.6–11)

## 2024-10-18 PROCEDURE — 6370000000 HC RX 637 (ALT 250 FOR IP): Performed by: STUDENT IN AN ORGANIZED HEALTH CARE EDUCATION/TRAINING PROGRAM

## 2024-10-18 PROCEDURE — 2580000003 HC RX 258: Performed by: STUDENT IN AN ORGANIZED HEALTH CARE EDUCATION/TRAINING PROGRAM

## 2024-10-18 PROCEDURE — 85025 COMPLETE CBC W/AUTO DIFF WBC: CPT

## 2024-10-18 PROCEDURE — 80076 HEPATIC FUNCTION PANEL: CPT

## 2024-10-18 PROCEDURE — 83036 HEMOGLOBIN GLYCOSYLATED A1C: CPT

## 2024-10-18 PROCEDURE — 82962 GLUCOSE BLOOD TEST: CPT

## 2024-10-18 PROCEDURE — 94761 N-INVAS EAR/PLS OXIMETRY MLT: CPT

## 2024-10-18 PROCEDURE — 1100000000 HC RM PRIVATE

## 2024-10-18 PROCEDURE — 36415 COLL VENOUS BLD VENIPUNCTURE: CPT

## 2024-10-18 PROCEDURE — 80048 BASIC METABOLIC PNL TOTAL CA: CPT

## 2024-10-18 PROCEDURE — 87899 AGENT NOS ASSAY W/OPTIC: CPT

## 2024-10-18 PROCEDURE — 99223 1ST HOSP IP/OBS HIGH 75: CPT | Performed by: INTERNAL MEDICINE

## 2024-10-18 PROCEDURE — 2700000000 HC OXYGEN THERAPY PER DAY

## 2024-10-18 PROCEDURE — 6360000002 HC RX W HCPCS: Performed by: STUDENT IN AN ORGANIZED HEALTH CARE EDUCATION/TRAINING PROGRAM

## 2024-10-18 PROCEDURE — 94640 AIRWAY INHALATION TREATMENT: CPT

## 2024-10-18 RX ORDER — DEXAMETHASONE 1 MG
1 TABLET ORAL
Qty: 30 TABLET | Refills: 5 | Status: SHIPPED | OUTPATIENT
Start: 2024-10-18

## 2024-10-18 RX ADMIN — BENZONATATE 100 MG: 100 CAPSULE ORAL at 18:02

## 2024-10-18 RX ADMIN — GUAIFENESIN 600 MG: 600 TABLET ORAL at 10:03

## 2024-10-18 RX ADMIN — DEXAMETHASONE 1 MG: 0.5 TABLET ORAL at 12:09

## 2024-10-18 RX ADMIN — SODIUM CHLORIDE: 9 INJECTION, SOLUTION INTRAVENOUS at 13:25

## 2024-10-18 RX ADMIN — INSULIN LISPRO 1 UNITS: 100 INJECTION, SOLUTION INTRAVENOUS; SUBCUTANEOUS at 17:43

## 2024-10-18 RX ADMIN — SODIUM CHLORIDE, PRESERVATIVE FREE 10 ML: 5 INJECTION INTRAVENOUS at 20:59

## 2024-10-18 RX ADMIN — CEFTRIAXONE 1000 MG: 1 INJECTION, POWDER, FOR SOLUTION INTRAMUSCULAR; INTRAVENOUS at 13:19

## 2024-10-18 RX ADMIN — AZITHROMYCIN DIHYDRATE 500 MG: 500 INJECTION, POWDER, LYOPHILIZED, FOR SOLUTION INTRAVENOUS at 13:26

## 2024-10-18 RX ADMIN — BENZONATATE 100 MG: 100 CAPSULE ORAL at 03:25

## 2024-10-18 RX ADMIN — SODIUM CHLORIDE, PRESERVATIVE FREE 10 ML: 5 INJECTION INTRAVENOUS at 10:03

## 2024-10-18 RX ADMIN — APIXABAN 5 MG: 5 TABLET, FILM COATED ORAL at 20:58

## 2024-10-18 RX ADMIN — GUAIFENESIN 600 MG: 600 TABLET ORAL at 20:58

## 2024-10-18 RX ADMIN — APIXABAN 5 MG: 5 TABLET, FILM COATED ORAL at 10:03

## 2024-10-18 RX ADMIN — IPRATROPIUM BROMIDE AND ALBUTEROL SULFATE 1 DOSE: 2.5; .5 SOLUTION RESPIRATORY (INHALATION) at 15:08

## 2024-10-18 ASSESSMENT — PAIN SCALES - WONG BAKER: WONGBAKER_NUMERICALRESPONSE: NO HURT

## 2024-10-18 ASSESSMENT — PAIN SCALES - GENERAL: PAINLEVEL_OUTOF10: 0

## 2024-10-18 NOTE — WOUND CARE
Wound Care Note:     New consult for \"Chronic metastatic neck wounds\"  Seen in 315    64 y.o. y/o female admitted on 10/17/2024   Admitted for Hypoxemia [R09.02]  Severe sepsis (HCC) [A41.9, R65.20]  Malignant neoplasm metastatic to both lungs (HCC) [C78.01, C78.02]  Multifocal pneumonia [J18.9]  Acute hypoxic respiratory failure [J96.01]     Past Medical History:   Diagnosis Date    DM type 2 causing vascular disease (HCC)     Hx of completed stroke     L posterior parietal    HX: breast cancer     Hypertension     Metastasis from breast cancer (HCC)     liver, lung, bone, lymph nodes     WBC = 3.2  Diet: ADULT DIET; Regular; 4 carb choices (60 gm/meal)  ADULT ORAL NUTRITION SUPPLEMENT; Lunch, Dinner; Low Calorie/High Protein Oral Supplement           Assessment:   Patient is alert, cooperative and reports no pain.    Repositions independently for assessment.      Surface: [place] bed with LIZZ mattress    1. POA Left neck   Metastatic skin lesions- followed by oncology  Dry, raised, no open areas, dried serosanguinous exudate, no pain, no odor, with surrounding blanchable erythema  Tx: Cleansed, left open to air. No dressing needed at this time due to no draining.      Recommendations:    Left neck Cleanse and leave open to air. If wound begins to drain, cleanse cover with petroleum gauze and dry dressing daily to absorb moisture.     No concerns to relay to provider.    Transition of Care: Plan to follow weekly and as needed while admitted to hospital.    Marilin Davila RN  [facility] Inpatient Wound Care Department  Office [phone]  Available via [employer]

## 2024-10-19 LAB
ANION GAP SERPL CALC-SCNC: 5 MMOL/L (ref 2–12)
BASOPHILS # BLD: 0 K/UL (ref 0–0.1)
BASOPHILS NFR BLD: 1 % (ref 0–1)
BUN SERPL-MCNC: 12 MG/DL (ref 6–20)
BUN/CREAT SERPL: 24 (ref 12–20)
CALCIUM SERPL-MCNC: 8.7 MG/DL (ref 8.5–10.1)
CHLORIDE SERPL-SCNC: 106 MMOL/L (ref 97–108)
CO2 SERPL-SCNC: 29 MMOL/L (ref 21–32)
CREAT SERPL-MCNC: 0.5 MG/DL (ref 0.55–1.02)
DIFFERENTIAL METHOD BLD: ABNORMAL
EKG ATRIAL RATE: 95 BPM
EKG DIAGNOSIS: NORMAL
EKG P AXIS: 43 DEGREES
EKG P-R INTERVAL: 168 MS
EKG Q-T INTERVAL: 358 MS
EKG QRS DURATION: 86 MS
EKG QTC CALCULATION (BAZETT): 449 MS
EKG R AXIS: -25 DEGREES
EKG T AXIS: 42 DEGREES
EKG VENTRICULAR RATE: 95 BPM
EOSINOPHIL # BLD: 0 K/UL (ref 0–0.4)
EOSINOPHIL NFR BLD: 0 % (ref 0–7)
ERYTHROCYTE [DISTWIDTH] IN BLOOD BY AUTOMATED COUNT: 20.4 % (ref 11.5–14.5)
GLUCOSE BLD STRIP.AUTO-MCNC: 125 MG/DL (ref 65–117)
GLUCOSE BLD STRIP.AUTO-MCNC: 150 MG/DL (ref 65–117)
GLUCOSE BLD STRIP.AUTO-MCNC: 166 MG/DL (ref 65–117)
GLUCOSE BLD STRIP.AUTO-MCNC: 183 MG/DL (ref 65–117)
GLUCOSE SERPL-MCNC: 140 MG/DL (ref 65–100)
HCT VFR BLD AUTO: 28.9 % (ref 35–47)
HGB BLD-MCNC: 8.6 G/DL (ref 11.5–16)
IMM GRANULOCYTES # BLD AUTO: 0 K/UL (ref 0–0.04)
IMM GRANULOCYTES NFR BLD AUTO: 1 % (ref 0–0.5)
LYMPHOCYTES # BLD: 0.9 K/UL (ref 0.8–3.5)
LYMPHOCYTES NFR BLD: 27 % (ref 12–49)
MCH RBC QN AUTO: 26.4 PG (ref 26–34)
MCHC RBC AUTO-ENTMCNC: 29.8 G/DL (ref 30–36.5)
MCV RBC AUTO: 88.7 FL (ref 80–99)
MONOCYTES # BLD: 0.3 K/UL (ref 0–1)
MONOCYTES NFR BLD: 9 % (ref 5–13)
NEUTS SEG # BLD: 2.2 K/UL (ref 1.8–8)
NEUTS SEG NFR BLD: 62 % (ref 32–75)
NRBC # BLD: 0 K/UL (ref 0–0.01)
NRBC BLD-RTO: 0 PER 100 WBC
PLATELET # BLD AUTO: 341 K/UL (ref 150–400)
PMV BLD AUTO: 8.9 FL (ref 8.9–12.9)
POTASSIUM SERPL-SCNC: 3.5 MMOL/L (ref 3.5–5.1)
RBC # BLD AUTO: 3.26 M/UL (ref 3.8–5.2)
RBC MORPH BLD: ABNORMAL
SERVICE CMNT-IMP: ABNORMAL
SODIUM SERPL-SCNC: 140 MMOL/L (ref 136–145)
SPECIMEN HOLD: NORMAL
WBC # BLD AUTO: 3.4 K/UL (ref 3.6–11)

## 2024-10-19 PROCEDURE — 2700000000 HC OXYGEN THERAPY PER DAY

## 2024-10-19 PROCEDURE — 80048 BASIC METABOLIC PNL TOTAL CA: CPT

## 2024-10-19 PROCEDURE — 6360000002 HC RX W HCPCS: Performed by: STUDENT IN AN ORGANIZED HEALTH CARE EDUCATION/TRAINING PROGRAM

## 2024-10-19 PROCEDURE — 1100000000 HC RM PRIVATE

## 2024-10-19 PROCEDURE — 6360000002 HC RX W HCPCS: Performed by: INTERNAL MEDICINE

## 2024-10-19 PROCEDURE — 36415 COLL VENOUS BLD VENIPUNCTURE: CPT

## 2024-10-19 PROCEDURE — 2580000003 HC RX 258: Performed by: STUDENT IN AN ORGANIZED HEALTH CARE EDUCATION/TRAINING PROGRAM

## 2024-10-19 PROCEDURE — 6370000000 HC RX 637 (ALT 250 FOR IP): Performed by: STUDENT IN AN ORGANIZED HEALTH CARE EDUCATION/TRAINING PROGRAM

## 2024-10-19 PROCEDURE — 85025 COMPLETE CBC W/AUTO DIFF WBC: CPT

## 2024-10-19 PROCEDURE — 93010 ELECTROCARDIOGRAM REPORT: CPT | Performed by: SPECIALIST

## 2024-10-19 PROCEDURE — 2580000003 HC RX 258: Performed by: INTERNAL MEDICINE

## 2024-10-19 PROCEDURE — 82962 GLUCOSE BLOOD TEST: CPT

## 2024-10-19 RX ADMIN — CEFTRIAXONE SODIUM 2000 MG: 2 INJECTION, POWDER, FOR SOLUTION INTRAMUSCULAR; INTRAVENOUS at 12:53

## 2024-10-19 RX ADMIN — BENZONATATE 100 MG: 100 CAPSULE ORAL at 05:33

## 2024-10-19 RX ADMIN — AZITHROMYCIN DIHYDRATE 500 MG: 500 INJECTION, POWDER, LYOPHILIZED, FOR SOLUTION INTRAVENOUS at 12:51

## 2024-10-19 RX ADMIN — GUAIFENESIN 600 MG: 600 TABLET ORAL at 20:16

## 2024-10-19 RX ADMIN — SODIUM CHLORIDE, PRESERVATIVE FREE 10 ML: 5 INJECTION INTRAVENOUS at 09:17

## 2024-10-19 RX ADMIN — INSULIN LISPRO 1 UNITS: 100 INJECTION, SOLUTION INTRAVENOUS; SUBCUTANEOUS at 12:55

## 2024-10-19 RX ADMIN — SODIUM CHLORIDE, PRESERVATIVE FREE 10 ML: 5 INJECTION INTRAVENOUS at 20:19

## 2024-10-19 RX ADMIN — BENZONATATE 100 MG: 100 CAPSULE ORAL at 16:17

## 2024-10-19 RX ADMIN — GUAIFENESIN 600 MG: 600 TABLET ORAL at 09:17

## 2024-10-19 RX ADMIN — SODIUM CHLORIDE: 9 INJECTION, SOLUTION INTRAVENOUS at 12:50

## 2024-10-19 RX ADMIN — APIXABAN 5 MG: 5 TABLET, FILM COATED ORAL at 09:17

## 2024-10-19 RX ADMIN — DEXAMETHASONE 1 MG: 0.5 TABLET ORAL at 09:16

## 2024-10-19 RX ADMIN — APIXABAN 5 MG: 5 TABLET, FILM COATED ORAL at 20:16

## 2024-10-20 LAB
ANION GAP SERPL CALC-SCNC: 1 MMOL/L (ref 2–12)
BASOPHILS # BLD: 0 K/UL (ref 0–0.1)
BASOPHILS NFR BLD: 1 % (ref 0–1)
BUN SERPL-MCNC: 21 MG/DL (ref 6–20)
BUN/CREAT SERPL: 36 (ref 12–20)
CALCIUM SERPL-MCNC: 9 MG/DL (ref 8.5–10.1)
CHLORIDE SERPL-SCNC: 105 MMOL/L (ref 97–108)
CO2 SERPL-SCNC: 32 MMOL/L (ref 21–32)
CREAT SERPL-MCNC: 0.58 MG/DL (ref 0.55–1.02)
DIFFERENTIAL METHOD BLD: ABNORMAL
EOSINOPHIL # BLD: 0 K/UL (ref 0–0.4)
EOSINOPHIL NFR BLD: 0 % (ref 0–7)
ERYTHROCYTE [DISTWIDTH] IN BLOOD BY AUTOMATED COUNT: 20.5 % (ref 11.5–14.5)
GLUCOSE BLD STRIP.AUTO-MCNC: 103 MG/DL (ref 65–117)
GLUCOSE BLD STRIP.AUTO-MCNC: 147 MG/DL (ref 65–117)
GLUCOSE BLD STRIP.AUTO-MCNC: 151 MG/DL (ref 65–117)
GLUCOSE BLD STRIP.AUTO-MCNC: 154 MG/DL (ref 65–117)
GLUCOSE SERPL-MCNC: 139 MG/DL (ref 65–100)
HCT VFR BLD AUTO: 29 % (ref 35–47)
HGB BLD-MCNC: 8.7 G/DL (ref 11.5–16)
IMM GRANULOCYTES # BLD AUTO: 0 K/UL (ref 0–0.04)
IMM GRANULOCYTES NFR BLD AUTO: 1 % (ref 0–0.5)
L PNEUMO1 AG UR QL IA: NEGATIVE
LYMPHOCYTES # BLD: 1 K/UL (ref 0.8–3.5)
LYMPHOCYTES NFR BLD: 28 % (ref 12–49)
MCH RBC QN AUTO: 26.3 PG (ref 26–34)
MCHC RBC AUTO-ENTMCNC: 30 G/DL (ref 30–36.5)
MCV RBC AUTO: 87.6 FL (ref 80–99)
MONOCYTES # BLD: 0.4 K/UL (ref 0–1)
MONOCYTES NFR BLD: 12 % (ref 5–13)
NEUTS SEG # BLD: 2 K/UL (ref 1.8–8)
NEUTS SEG NFR BLD: 59 % (ref 32–75)
NRBC # BLD: 0 K/UL (ref 0–0.01)
NRBC BLD-RTO: 0 PER 100 WBC
PLATELET # BLD AUTO: 368 K/UL (ref 150–400)
PMV BLD AUTO: 8.6 FL (ref 8.9–12.9)
POTASSIUM SERPL-SCNC: 3.6 MMOL/L (ref 3.5–5.1)
RBC # BLD AUTO: 3.31 M/UL (ref 3.8–5.2)
SERVICE CMNT-IMP: ABNORMAL
SERVICE CMNT-IMP: NORMAL
SODIUM SERPL-SCNC: 138 MMOL/L (ref 136–145)
SPECIMEN SOURCE: NORMAL
WBC # BLD AUTO: 3.4 K/UL (ref 3.6–11)

## 2024-10-20 PROCEDURE — 6370000000 HC RX 637 (ALT 250 FOR IP): Performed by: STUDENT IN AN ORGANIZED HEALTH CARE EDUCATION/TRAINING PROGRAM

## 2024-10-20 PROCEDURE — 80048 BASIC METABOLIC PNL TOTAL CA: CPT

## 2024-10-20 PROCEDURE — 2580000003 HC RX 258: Performed by: STUDENT IN AN ORGANIZED HEALTH CARE EDUCATION/TRAINING PROGRAM

## 2024-10-20 PROCEDURE — 1100000000 HC RM PRIVATE

## 2024-10-20 PROCEDURE — 85025 COMPLETE CBC W/AUTO DIFF WBC: CPT

## 2024-10-20 PROCEDURE — 94761 N-INVAS EAR/PLS OXIMETRY MLT: CPT

## 2024-10-20 PROCEDURE — 36415 COLL VENOUS BLD VENIPUNCTURE: CPT

## 2024-10-20 PROCEDURE — 82962 GLUCOSE BLOOD TEST: CPT

## 2024-10-20 PROCEDURE — 2580000003 HC RX 258: Performed by: INTERNAL MEDICINE

## 2024-10-20 PROCEDURE — 6360000002 HC RX W HCPCS: Performed by: INTERNAL MEDICINE

## 2024-10-20 PROCEDURE — 6360000002 HC RX W HCPCS: Performed by: STUDENT IN AN ORGANIZED HEALTH CARE EDUCATION/TRAINING PROGRAM

## 2024-10-20 PROCEDURE — 2700000000 HC OXYGEN THERAPY PER DAY

## 2024-10-20 RX ADMIN — GUAIFENESIN 600 MG: 600 TABLET ORAL at 20:50

## 2024-10-20 RX ADMIN — CEFTRIAXONE SODIUM 2000 MG: 2 INJECTION, POWDER, FOR SOLUTION INTRAMUSCULAR; INTRAVENOUS at 12:24

## 2024-10-20 RX ADMIN — SODIUM CHLORIDE, PRESERVATIVE FREE 10 ML: 5 INJECTION INTRAVENOUS at 20:51

## 2024-10-20 RX ADMIN — DEXAMETHASONE 1 MG: 0.5 TABLET ORAL at 08:42

## 2024-10-20 RX ADMIN — GUAIFENESIN 600 MG: 600 TABLET ORAL at 08:42

## 2024-10-20 RX ADMIN — APIXABAN 5 MG: 5 TABLET, FILM COATED ORAL at 20:50

## 2024-10-20 RX ADMIN — AZITHROMYCIN DIHYDRATE 500 MG: 500 INJECTION, POWDER, LYOPHILIZED, FOR SOLUTION INTRAVENOUS at 12:27

## 2024-10-20 RX ADMIN — SODIUM CHLORIDE, PRESERVATIVE FREE 10 ML: 5 INJECTION INTRAVENOUS at 08:42

## 2024-10-20 RX ADMIN — SODIUM CHLORIDE: 9 INJECTION, SOLUTION INTRAVENOUS at 12:26

## 2024-10-20 RX ADMIN — ACETAMINOPHEN 650 MG: 325 TABLET ORAL at 12:21

## 2024-10-20 RX ADMIN — APIXABAN 5 MG: 5 TABLET, FILM COATED ORAL at 08:42

## 2024-10-20 ASSESSMENT — PAIN SCALES - GENERAL
PAINLEVEL_OUTOF10: 0
PAINLEVEL_OUTOF10: 3
PAINLEVEL_OUTOF10: 0

## 2024-10-20 ASSESSMENT — PAIN - FUNCTIONAL ASSESSMENT: PAIN_FUNCTIONAL_ASSESSMENT: ACTIVITIES ARE NOT PREVENTED

## 2024-10-20 ASSESSMENT — PAIN DESCRIPTION - LOCATION: LOCATION: HEAD

## 2024-10-21 LAB
BACTERIA SPEC CULT: ABNORMAL
FLUID CULTURE: NORMAL
GLUCOSE BLD STRIP.AUTO-MCNC: 100 MG/DL (ref 65–117)
GLUCOSE BLD STRIP.AUTO-MCNC: 112 MG/DL (ref 65–117)
GLUCOSE BLD STRIP.AUTO-MCNC: 118 MG/DL (ref 65–117)
GLUCOSE BLD STRIP.AUTO-MCNC: 139 MG/DL (ref 65–117)
GLUCOSE BLD STRIP.AUTO-MCNC: 147 MG/DL (ref 65–117)
GRAM STN SPEC: ABNORMAL
Lab: NORMAL
ORGANISM ID: NORMAL
S PNEUM AG SPEC QL LA: NEGATIVE
SERVICE CMNT-IMP: ABNORMAL
SERVICE CMNT-IMP: NORMAL
SERVICE CMNT-IMP: NORMAL
SPECIMEN SOURCE: NORMAL
SPECIMEN: NORMAL

## 2024-10-21 PROCEDURE — 2580000003 HC RX 258: Performed by: STUDENT IN AN ORGANIZED HEALTH CARE EDUCATION/TRAINING PROGRAM

## 2024-10-21 PROCEDURE — 99232 SBSQ HOSP IP/OBS MODERATE 35: CPT | Performed by: INTERNAL MEDICINE

## 2024-10-21 PROCEDURE — 82962 GLUCOSE BLOOD TEST: CPT

## 2024-10-21 PROCEDURE — 94618 PULMONARY STRESS TESTING: CPT

## 2024-10-21 PROCEDURE — 6360000002 HC RX W HCPCS: Performed by: INTERNAL MEDICINE

## 2024-10-21 PROCEDURE — 1100000000 HC RM PRIVATE

## 2024-10-21 PROCEDURE — 2700000000 HC OXYGEN THERAPY PER DAY

## 2024-10-21 PROCEDURE — 6370000000 HC RX 637 (ALT 250 FOR IP): Performed by: STUDENT IN AN ORGANIZED HEALTH CARE EDUCATION/TRAINING PROGRAM

## 2024-10-21 PROCEDURE — 2580000003 HC RX 258: Performed by: INTERNAL MEDICINE

## 2024-10-21 PROCEDURE — 6360000002 HC RX W HCPCS: Performed by: STUDENT IN AN ORGANIZED HEALTH CARE EDUCATION/TRAINING PROGRAM

## 2024-10-21 RX ORDER — BENZONATATE 100 MG/1
100 CAPSULE ORAL 3 TIMES DAILY PRN
Qty: 15 CAPSULE | Refills: 0 | Status: SHIPPED | OUTPATIENT
Start: 2024-10-21 | End: 2024-10-26

## 2024-10-21 RX ORDER — AZITHROMYCIN 500 MG/1
500 TABLET, FILM COATED ORAL DAILY
Qty: 2 TABLET | Refills: 0 | Status: SHIPPED | OUTPATIENT
Start: 2024-10-21 | End: 2024-10-23

## 2024-10-21 RX ADMIN — SODIUM CHLORIDE, PRESERVATIVE FREE 10 ML: 5 INJECTION INTRAVENOUS at 08:26

## 2024-10-21 RX ADMIN — GUAIFENESIN 600 MG: 600 TABLET ORAL at 20:43

## 2024-10-21 RX ADMIN — DEXAMETHASONE 1 MG: 0.5 TABLET ORAL at 08:25

## 2024-10-21 RX ADMIN — AZITHROMYCIN DIHYDRATE 500 MG: 500 INJECTION, POWDER, LYOPHILIZED, FOR SOLUTION INTRAVENOUS at 13:00

## 2024-10-21 RX ADMIN — APIXABAN 5 MG: 5 TABLET, FILM COATED ORAL at 08:25

## 2024-10-21 RX ADMIN — SODIUM CHLORIDE, PRESERVATIVE FREE 10 ML: 5 INJECTION INTRAVENOUS at 20:43

## 2024-10-21 RX ADMIN — APIXABAN 5 MG: 5 TABLET, FILM COATED ORAL at 20:43

## 2024-10-21 RX ADMIN — CEFTRIAXONE SODIUM 2000 MG: 2 INJECTION, POWDER, FOR SOLUTION INTRAMUSCULAR; INTRAVENOUS at 12:59

## 2024-10-21 RX ADMIN — GUAIFENESIN 600 MG: 600 TABLET ORAL at 08:26

## 2024-10-21 ASSESSMENT — PAIN DESCRIPTION - LOCATION: LOCATION: HEAD

## 2024-10-21 ASSESSMENT — PAIN SCALES - GENERAL
PAINLEVEL_OUTOF10: 0
PAINLEVEL_OUTOF10: 3

## 2024-10-21 ASSESSMENT — PAIN DESCRIPTION - DESCRIPTORS: DESCRIPTORS: ACHING

## 2024-10-21 ASSESSMENT — PAIN - FUNCTIONAL ASSESSMENT: PAIN_FUNCTIONAL_ASSESSMENT: ACTIVITIES ARE NOT PREVENTED

## 2024-10-21 ASSESSMENT — PAIN DESCRIPTION - PAIN TYPE: TYPE: ACUTE PAIN

## 2024-10-21 NOTE — PROGRESS NOTES
MARYCRUZ HOOKER [facility]  [address] [place] [address]  [phone]      Hospitalist  Progress Note      NAME:       Debra Moser   :        1960  MRM:        [id number]    Date of service: 10/20/2024      Subjective: Patient seen and examined by me. Patient admitted with suspected multifocal pneumonia vs metastatic disease. She continues to feel better. No cough but has some SOB and a right shoulder discomfort. Discussed with her through a Trin[place] video interpretor.     Objective:    Vital Signs:    /73   Pulse 79   Temp 97.5 °F (36.4 °C) (Oral)   Resp 18   Ht 1.575 m (5' 2\")   Wt 57.7 kg (127 lb 3.3 oz)   SpO2 96%   BMI 23.27 kg/m²        Intake/Output Summary (Last 24 hours) at 10/20/2024 1001  Last data filed at 10/19/2024 204  Gross per 24 hour   Intake 200 ml   Output --   Net 200 ml        Current inpatient medications reviewed:  Current Facility-Administered Medications   Medication Dose Route Frequency    cefTRIAXone (ROCEPHIN) 2,000 mg in sterile water 20 mL IV syringe  2,000 mg IntraVENous Q24H    sodium chloride flush 0.9 % injection 5-40 mL  5-40 mL IntraVENous 2 times per day    sodium chloride flush 0.9 % injection 5-40 mL  5-40 mL IntraVENous PRN    0.9 % sodium chloride infusion   IntraVENous PRN    potassium chloride (KLOR-CON) extended release tablet 40 mEq  40 mEq Oral PRN    Or    potassium bicarb-citric acid (EFFER-K) effervescent tablet 40 mEq  40 mEq Oral PRN    Or    potassium chloride 10 mEq/100 mL IVPB (Peripheral Line)  10 mEq IntraVENous PRN    magnesium sulfate 2000 mg in 50 mL IVPB premix  2,000 mg IntraVENous PRN    ondansetron (ZOFRAN-ODT) disintegrating tablet 4 mg  4 mg Oral Q8H PRN    Or    ondansetron (ZOFRAN) injection 4 mg  4 mg IntraVENous Q6H PRN    polyethylene glycol (GLYCOLAX) packet 17 g  17 g Oral Daily PRN    acetaminophen (TYLENOL) tablet 650 mg  
                                                             MARYCRUZ HOOKER [facility]  [address] [place], [place] [id number] [phone]      Hospitalist  Progress Note      NAME:       Debra Moser   :        1960  MRM:        [id number]    Date of service: 10/19/2024      Subjective: Patient seen and examined by me. Patient admitted with suspected multifocal pneumonia vs metastatic disease. She says she is still coughing with some chest discomfort. Needing less oxygen. Discussed with her through a Ukrainian video interpretor.     Objective:    Vital Signs:    /70   Pulse 77   Temp 98.1 °F (36.7 °C) (Oral)   Resp 17   Ht 1.575 m (5' 2\")   Wt 57.7 kg (127 lb 3.3 oz)   SpO2 95%   BMI 23.27 kg/m²        Intake/Output Summary (Last 24 hours) at 10/19/2024 1059  Last data filed at 10/19/2024 0918  Gross per 24 hour   Intake 643 ml   Output 450 ml   Net 193 ml        Current inpatient medications reviewed:  Current Facility-Administered Medications   Medication Dose Route Frequency    sodium chloride flush 0.9 % injection 5-40 mL  5-40 mL IntraVENous 2 times per day    sodium chloride flush 0.9 % injection 5-40 mL  5-40 mL IntraVENous PRN    0.9 % sodium chloride infusion   IntraVENous PRN    potassium chloride (KLOR-CON) extended release tablet 40 mEq  40 mEq Oral PRN    Or    potassium bicarb-citric acid (EFFER-K) effervescent tablet 40 mEq  40 mEq Oral PRN    Or    potassium chloride 10 mEq/100 mL IVPB (Peripheral Line)  10 mEq IntraVENous PRN    magnesium sulfate 2000 mg in 50 mL IVPB premix  2,000 mg IntraVENous PRN    ondansetron (ZOFRAN-ODT) disintegrating tablet 4 mg  4 mg Oral Q8H PRN    Or    ondansetron (ZOFRAN) injection 4 mg  4 mg IntraVENous Q6H PRN    polyethylene glycol (GLYCOLAX) packet 17 g  17 g Oral Daily PRN    acetaminophen (TYLENOL) tablet 650 mg  650 mg Oral Q6H PRN    Or    acetaminophen (TYLENOL) suppository 650 mg  650 mg Rectal Q6H PRN    
   10/21/24 1141   Resting (Room Air)   SpO2 89   HR 88   During Walk (Room Air)   SpO2 86   HR 96   Comments placed on 2L NC.   During Walk (On O2)   SpO2 90   HR 90   O2 Device Nasal cannula   O2 Flow Rate (l/min) 2 l/min   Need Additional O2 Flow Rate Rows No   After Walk   SpO2 94   HR 90   O2 Device Nasal cannula   O2 Flow Rate (l/min) 2 l/min   Comments cleared to walk by CORY Lacy.  walked in hallway.  pt with mild SOB and desat to 86% on room air.  returned to chair on 2L NC.   Does the Patient Qualify for Home O2 Yes   Liter Flow on Exertion 2   Does the Patient Need Portable Oxygen Tanks Yes       
0700: Bedside and Verbal shift change report given to July RN (oncoming nurse) by Clau RN (offgoing nurse). Report included the following information Nurse Handoff Report.     1224: TRANSFER - OUT REPORT:    Verbal report given to 5th floor RN on Debra Moser  being transferred to 5th floor for routine progression of patient care       Report consisted of patient's Situation, Background, Assessment and   Recommendations(SBAR).     Information from the following report(s) Nurse Handoff Report was reviewed with the receiving nurse.           Lines:   Implantable Port Left Subclavian (Active)   Port-a-Cath Status Not Accessed 10/18/24 0400   Criteria for Appropriate Use Irritant/vesicant medication 10/09/24 1022   Site Assessment Clean, dry & intact 10/09/24 1022   Line Care Ports disinfected 10/09/24 1022   Alcohol Cap Used Yes 10/09/24 1022   Date of Last Dressing Change 10/09/24 10/09/24 1022   Dressing Type Transparent w/CHG gel 10/09/24 1022   Dressing Status New dressing applied 10/09/24 1022   Dressing Intervention New 10/09/24 1022   De-Access Time 1355 10/09/24 1022   De-Accessed By myr 10/09/24 1022       Peripheral IV 10/17/24 Left Antecubital (Active)   Site Assessment Clean, dry & intact 10/18/24 0720   Line Status Infusing 10/18/24 0720   Line Care Connections checked and tightened 10/18/24 0720   Phlebitis Assessment No symptoms 10/18/24 0720   Infiltration Assessment 0 10/18/24 0720   Alcohol Cap Used Yes 10/18/24 0720   Dressing Status Clean, dry & intact 10/18/24 0720   Dressing Type Transparent 10/18/24 0720       Peripheral IV 10/17/24 Distal;Left Forearm (Active)        Opportunity for questions and clarification was provided.      Patient transported with:  Monitor, O2 @ 4lpm, and Tech        
0929 Pt urine sample sent to lab via tube station.   
1310: TRANSFER - IN REPORT:    Verbal report received from Tejal RAY on Debra Moser  being received from ED for routine progression of patient care      Report consisted of patient's Situation, Background, Assessment and   Recommendations(SBAR).     Information from the following report(s) Nurse Handoff Report was reviewed with the receiving nurse.    Opportunity for questions and clarification was provided.      Assessment completed upon patient's arrival to unit and care assumed.     1900: Bedside and Verbal shift change report given to Clau RAY (oncoming nurse) by July RAY and Narda (Student RN) (offgoing nurse). Report included the following information Nurse Handoff Report.       
Prescription meds handed to Pt.   
Provided  services on-site to Dr. Underwood during patient assessment.  Opportunity for questions and clarification was provided.            ADRIAN Gaytan Senior  - Navigator  [phone]  
guaiFENesin (MUCINEX) extended release tablet 600 mg  600 mg Oral BID    ipratropium 0.5 mg-albuterol 2.5 mg (DUONEB) nebulizer solution 1 Dose  1 Dose Inhalation Q4H PRN    cefTRIAXone (ROCEPHIN) 1,000 mg in sterile water 10 mL IV syringe  1,000 mg IntraVENous Q24H    azithromycin (ZITHROMAX) 500 mg in sodium chloride 0.9 % 250 mL IVPB ([id number])  500 mg IntraVENous Q24H    apixaban (ELIQUIS) tablet 5 mg  5 mg Oral BID    dexAMETHasone (DECADRON) tablet 1 mg  1 mg Oral Daily    benzonatate (TESSALON) capsule 100 mg  100 mg Oral TID PRN    glucose chewable tablet 16 g  4 tablet Oral PRN    dextrose bolus 10% 125 mL  125 mL IntraVENous PRN    Or    dextrose bolus 10% 250 mL  250 mL IntraVENous PRN    glucagon injection 1 mg  1 mg SubCUTAneous PRN    dextrose 10 % infusion   IntraVENous Continuous PRN    insulin lispro (HUMALOG,ADMELOG) injection vial 0-4 Units  0-4 Units SubCUTAneous 4x Daily AC & HS    lactated ringers IV soln infusion   IntraVENous Continuous       Physical Examination:    General:   Weak and ill looking patient in no acute distress  Eyes:   pink conjunctivae, PERRLA with no discharge.  ENT:   no ottorrhea or rhinorrhea with dry mucous membranes  Neck: left neck masses from metastatic disease     Pulm:  no accessory muscle use, decreased breath sounds without crackles or wheezes  Card:  no JVD or murmurs, has regular and normal S1, S2 without thrills, bruits or peripheral edema  Abd:  Soft, non-tender, non-distended, normoactive bowel sounds with no palpable organomegaly  Musc:  No cyanosis, clubbing, atrophy or deformities.  Skin:  No rashes, bruising or ulcers.   Neuro: Awake and alert. Generally a non focal exam. Follows commands appropriately  Psych:  Has a fair insight and is oriented x 3    Diagnostic testing:    Laboratory data reviewed and independently interpreted:    Recent Labs     10/17/24  0942 10/18/24  0340   WBC 3.1* 3.2*   HGB 9.1* 8.8*   HCT 30.6* 29.9*   RBC 3.51* 3.37*   MCV 
  CO2 29 32   GLUCOSE 140* 139*   BUN 12 21*   CREATININE 0.50* 0.58   CALCIUM 8.7 9.0     No components found for: \"GLUCOSEPOC\"  No results found for: \"CHOL\", \"TRIG\", \"HDL\"    Imaging data reviewed:    CTA CHEST W WO CONTRAST    Result Date: 10/17/2024  1.  Suboptimal study. No pulmonary embolism to the segmental level. 2.  Interval overall slightly worsening consolidative and groundglass opacities, again concerning for multifocal pneumonia and/or neoplastic process. 3.  Redemonstrated hypodense right liver lesions, better characterized on prior exam. Electronically signed by KB GARZON    Cultures, Imaging studies reviewed and reports noted, Telemetry reviewed and independently interpreted by me and available notes from other care providers - all reviewed by me on: 10/21/2024        Assessment and Plan:    Multifocal bacterial pneumonia POA: noted on CT on top of likely metastatic disease. Rapid Flu, COVID neg. Blood cultures neg thus far. Sputum cultures growing moderate Haemophilus influenzae and moderate Streptococcus pneumoniae. Continue IV Ceftriaxone and Azithromycin. Wean oxygen as tolerated. Monitor clinical progress. Ambulate as tolerated.     Breast cancer metastasized to multiple sites POA: has mets to neck, hilar and left axillary lymph nodes, liver, lung and bone. On chemotherapy which will be on hold pending her clinical progress. Reviewed by Oncology. On Dexamethasone. Outpatient follow up    Acute hypoxic respiratory failure POA: likely due to the suspected pneumonia. Usually not on home oxygen. Was on high flow but now on 2L/min. Continue to wean as tolerated.  She will need home O2. Case management to set up home O2     Hx Pulmonary embolism and infarction POA: chronic and stable. Continue Apixaban    HTN (hypertension), benign POA: BP well controlled. Not on any medications    Hx of completed stroke POA: stable. Supportive care    Anemia POA: due to chemotherapy and chronic disease. Hgb 
sodium chloride, potassium chloride **OR** potassium alternative oral replacement **OR** potassium chloride, magnesium sulfate, ondansetron **OR** ondansetron, polyethylene glycol, acetaminophen **OR** acetaminophen, ipratropium 0.5 mg-albuterol 2.5 mg, benzonatate, glucose, dextrose bolus **OR** dextrose bolus, glucagon (rDNA), dextrose    Current Nutrition Intake & Therapies:    Average Meal Intake: 51-75%, %  Average Supplements Intake: None Ordered  ADULT DIET; Regular; 4 carb choices (60 gm/meal)    Anthropometric Measures:  Height: 157.5 cm (5' 2\")  Ideal Body Weight (IBW): 110 lbs (50 kg)       Current Body Weight: 57.7 kg (127 lb 3.3 oz), 115.6 % IBW. Weight Source: Standing Scale  Current BMI (kg/m2): 23.3  Usual Body Weight: 59 kg (130 lb)  % Weight Change (Calculated): -2.1  Weight Adjustment For: No Adjustment                 BMI Categories: Normal Weight (BMI 18.5-24.9)    Estimated Daily Nutrient Needs:  Energy Requirements Based On: Kcal/kg  Weight Used for Energy Requirements: Current  Energy (kcal/day): 5976-7875 (25-30 kcal/kg)  Weight Used for Protein Requirements: Current  Protein (g/day): 57-69 (1-1.2 g/kg)  Method Used for Fluid Requirements: 1 ml/kcal  Fluid (ml/day): 8358-5092    Nutrition Diagnosis:   Increased nutrient needs related to catabolic illness as evidenced by wounds (metastatic cancer)    Nutrition Interventions:   Food and/or Nutrient Delivery: Continue Current Diet, Start Oral Nutrition Supplement  Nutrition Education/Counseling: Survival skills/brief education completed  Coordination of Nutrition Care: Continue to monitor while inpatient       Goals:     Goals: by next RD assessment, Meet at least 75% of estimated needs       Nutrition Monitoring and Evaluation:   Behavioral-Environmental Outcomes: None Identified  Food/Nutrient Intake Outcomes: Food and Nutrient Intake, Supplement Intake  Physical Signs/Symptoms Outcomes: Biochemical Data, Skin, GI Status    Discharge 
discharged today or tomorrow, would proceed as outpatient on Wed    BAL in Nov 2023 suggests disease, no evidence of ILD.    Discussed that her EF was stable for awhile on T-Dxd, will monitor for sx at this time as to not change life sustaining medication for a small number change    We will plan to see the patient in follow up at least once per cycle, or sooner if symptoms warrant. Labs with each cycle for intensive monitoring for toxicity    The duration of this treatment plan will be until progression, intolerable side effects, or patient choice.    Rx:  zofran, compazine     HER NECK NODULES ARE NOT ABLE TO BE REMOVED SURGICALLY, NO INDICATION AT ALL FOR SURGICAL CONSULT.  THIS HAS BEEN DISCUSSED WITH PT MULTIPLE TIMES, including today    2. Bone Metastases: Discussed zometa 4 mg q 3 months IV:    We discussed side effects such as ONJ and the need to avoid invasive dental procedures while on these medications, renal damage, and hypocalcemia.  Last given 9/24/24. Next due 12/15/24.    3. Emotional well being:  She has excellent support and is coping well with her disease    4. DM2:  was on metformin, no longer taking    5. Right Shoulder pain:  on celebrex; she has started PT.    6.Cough/RLL pneumonia:                                                                                                                                                                                        She underwent bronchoscopy 11/16/23 with cultures negative at this point. Restarted 40 mg prednisone daily and planned to repeat high res CT which was not scheduled. Decreased to 30mg prednisone 12/12/23. Cough improved per patient. Decreased to 20mg daily 1/2/24. decreased to 10mg daily, 1/23/24. Decreased to 10 mg every other day 2/13/24. Discontinued 3/12/24.     She then continued with mild cough that was likely due to cancer progression, improved with tessalon 100mg TID prn. She reports she had improvement with levofloxacin but

## 2024-10-21 NOTE — PLAN OF CARE
Problem: Safety - Adult  Goal: Free from fall injury  Outcome: Progressing     Problem: Nutrition Deficit:  Goal: Optimize nutritional status  Outcome: Progressing     Problem: Pain  Goal: Verbalizes/displays adequate comfort level or baseline comfort level  Outcome: Progressing

## 2024-10-21 NOTE — DISCHARGE SUMMARY
stable. Continue Apixaban     HTN (hypertension), benign POA: BP well controlled. Not on any medications     Hx of completed stroke POA: stable. Supportive care     Anemia POA: due to chemotherapy and chronic disease. Hgb stable.     DM (diabetes mellitus), type 2 POA: diet controlled.      Care Plan discussed with: Patient, Nursing, CM                Imaging  CTA CHEST W WO CONTRAST    Result Date: 10/17/2024  1.  Suboptimal study. No pulmonary embolism to the segmental level. 2.  Interval overall slightly worsening consolidative and groundglass opacities, again concerning for multifocal pneumonia and/or neoplastic process. 3.  Redemonstrated hypodense right liver lesions, better characterized on prior exam. Electronically signed by KB GARZON       PCP: Catie Sutton MD     Consults: Oncology    Condition of patient at discharge: Stable    Discharge Exam:    Physical Exam:    Gen: Well-developed, well-nourished, in no acute distress  HEENT:  Pink conjunctivae, PERRL, hearing intact to voice, moist mucous membranes  Neck: Supple, without masses, thyroid non-tender  Resp: No accessory muscle use, clear breath sounds without wheezes rales or rhonchi  Card: No murmurs, normal S1, S2 without thrills, bruits or peripheral edema  Abd:  Soft, non-tender, non-distended, normoactive bowel sounds are present, no palpable organomegaly and no detectable hernias  Lymph:  No cervical or inguinal adenopathy  Musc: No cyanosis or clubbing  Skin: No rashes or ulcers, skin turgor is good  Neuro:  Cranial nerves are grossly intact, no focal motor weakness, follows commands appropriately  Psych:  Good insight, oriented to person, place and time, alert          Disposition: home with home health    Patient Instructions:      Medication List        START taking these medications      amoxicillin-clavulanate 875-125 MG per tablet  Commonly known as: AUGMENTIN  Take 1 tablet by mouth 2 times daily for 3 days     azithromycin

## 2024-10-22 ENCOUNTER — APPOINTMENT (OUTPATIENT)
Facility: HOSPITAL | Age: 64
End: 2024-10-22
Payer: MEDICAID

## 2024-10-22 VITALS
RESPIRATION RATE: 20 BRPM | HEIGHT: 62 IN | TEMPERATURE: 98.1 F | BODY MASS INDEX: 23.41 KG/M2 | SYSTOLIC BLOOD PRESSURE: 129 MMHG | WEIGHT: 127.21 LBS | HEART RATE: 79 BPM | OXYGEN SATURATION: 95 % | DIASTOLIC BLOOD PRESSURE: 68 MMHG

## 2024-10-22 LAB
GLUCOSE BLD STRIP.AUTO-MCNC: 102 MG/DL (ref 65–117)
SERVICE CMNT-IMP: NORMAL

## 2024-10-22 PROCEDURE — 6370000000 HC RX 637 (ALT 250 FOR IP): Performed by: STUDENT IN AN ORGANIZED HEALTH CARE EDUCATION/TRAINING PROGRAM

## 2024-10-22 PROCEDURE — 6360000002 HC RX W HCPCS: Performed by: STUDENT IN AN ORGANIZED HEALTH CARE EDUCATION/TRAINING PROGRAM

## 2024-10-22 PROCEDURE — 82962 GLUCOSE BLOOD TEST: CPT

## 2024-10-22 PROCEDURE — 2580000003 HC RX 258: Performed by: STUDENT IN AN ORGANIZED HEALTH CARE EDUCATION/TRAINING PROGRAM

## 2024-10-22 PROCEDURE — 2700000000 HC OXYGEN THERAPY PER DAY

## 2024-10-22 RX ADMIN — APIXABAN 5 MG: 5 TABLET, FILM COATED ORAL at 10:02

## 2024-10-22 RX ADMIN — GUAIFENESIN 600 MG: 600 TABLET ORAL at 10:02

## 2024-10-22 RX ADMIN — DEXAMETHASONE 1 MG: 0.5 TABLET ORAL at 10:02

## 2024-10-22 RX ADMIN — SODIUM CHLORIDE, PRESERVATIVE FREE 10 ML: 5 INJECTION INTRAVENOUS at 10:04

## 2024-10-22 ASSESSMENT — PAIN SCALES - GENERAL: PAINLEVEL_OUTOF10: 0

## 2024-10-22 NOTE — PLAN OF CARE
Problem: Chronic Conditions and Co-morbidities  Goal: Patient's chronic conditions and co-morbidity symptoms are monitored and maintained or improved  Outcome: Progressing     Problem: Discharge Planning  Goal: Discharge to home or other facility with appropriate resources  Outcome: Progressing     Problem: Safety - Adult  Goal: Free from fall injury  10/21/2024 2005 by Roxanne Chandler RN  Outcome: Progressing  10/21/2024 1012 by Bambi Powell RN  Outcome: Progressing     Problem: ABCDS Injury Assessment  Goal: Absence of physical injury  Outcome: Progressing     Problem: Pain  Goal: Verbalizes/displays adequate comfort level or baseline comfort level  10/21/2024 2005 by Roxanne Chandler RN  Outcome: Progressing  10/21/2024 1012 by Bambi Powell RN  Outcome: Progressing     Problem: Nutrition Deficit:  Goal: Optimize nutritional status  10/21/2024 2005 by Roxanne Chandler RN  Outcome: Progressing  10/21/2024 1012 by Bambi Powell RN  Outcome: Progressing      177.8

## 2024-10-22 NOTE — CONSULTS
Nutrition Note    Completed please see full assessment note    Tess Dueñas MS, RD, [facility]  Ext: [phone], or via Exiles      
Session ID: [id number]  Language: Hungarian   ID: #[id number]   Name: Franc
Session ID: [id number]  Language: Frisian   ID: #[id number]   Name: Jack
Session ID: [id number]  Language: Lithuanian   ID: #[id number]   Name: [place]
Session ID: [id number]  Language: Turkmen   ID: #[id number]   Name: Jennifer
Session ID: [id number]  Language: Divehi   ID: #[id number]   Name: Norma
Session ID: [id number]  Language: Welsh   ID: #[id number]   Name: Bettie
Session ID: [id number]  Language: Azeri   ID: #[id number]   Name: Sveta
Session ID: [id number]  Language: Serbian   ID: #[id number]   Name: Zia
neuropathy.     Interval history:  visit done with Cherelle from [facility].  3 days ago she developed worsening SOB.  + productive cough and lung pain.      Was admitted from 7/2/24-7/4/24 for PE and PNA    Past Medical History:   Diagnosis Date    DM type 2 causing vascular disease (HCC)     Hx of completed stroke     L posterior parietal    HX: breast cancer     Hypertension     Metastasis from breast cancer (HCC)     liver, lung, bone, lymph nodes      Past Surgical History:   Procedure Laterality Date    BRONCHOSCOPY N/A 11/16/2023    BRONCHOSCOPY performed by Flower Esteves MD at [facility] ENDOSCOPY    BRONCHOSCOPY  11/16/2023    BRONCHOSCOPY ALVEOLAR LAVAGE performed by Flower Esteves MD at [facility] ENDOSCOPY    CT NEEDLE BIOPSY SPINE  7/31/2019    CT NEEDLE BIOPSY SPINE 7/31/2019    HYSTERECTOMY (CERVIX STATUS UNKNOWN)  2012    MASTECTOMY Right     MEDICATION INJECTION N/A 11/16/2023    INJECTION MEDICATION performed by Flower Esteves MD at [facility] ENDOSCOPY      Social History     Tobacco Use    Smoking status: Never    Smokeless tobacco: Never   Substance Use Topics    Alcohol use: Not Currently      No family history on file.  Current Facility-Administered Medications   Medication Dose Route Frequency    sodium chloride flush 0.9 % injection 5-40 mL  5-40 mL IntraVENous 2 times per day    sodium chloride flush 0.9 % injection 5-40 mL  5-40 mL IntraVENous PRN    0.9 % sodium chloride infusion   IntraVENous PRN    potassium chloride (KLOR-CON) extended release tablet 40 mEq  40 mEq Oral PRN    Or    potassium bicarb-citric acid (EFFER-K) effervescent tablet 40 mEq  40 mEq Oral PRN    Or    potassium chloride 10 mEq/100 mL IVPB (Peripheral Line)  10 mEq IntraVENous PRN    magnesium sulfate 2000 mg in 50 mL IVPB premix  2,000 mg IntraVENous PRN    ondansetron (ZOFRAN-ODT) disintegrating tablet 4 mg  4 mg Oral Q8H PRN    Or    ondansetron (ZOFRAN) injection 4 mg  4 mg IntraVENous Q6H PRN    polyethylene

## 2024-10-23 ENCOUNTER — OFFICE VISIT (OUTPATIENT)
Age: 64
End: 2024-10-23
Payer: MEDICAID

## 2024-10-23 ENCOUNTER — HOSPITAL ENCOUNTER (OUTPATIENT)
Facility: HOSPITAL | Age: 64
Setting detail: INFUSION SERIES
Discharge: HOME | End: 2024-10-23
Payer: MEDICAID

## 2024-10-23 VITALS
HEART RATE: 89 BPM | TEMPERATURE: 98.2 F | WEIGHT: 125 LBS | OXYGEN SATURATION: 94 % | SYSTOLIC BLOOD PRESSURE: 128 MMHG | BODY MASS INDEX: 23 KG/M2 | DIASTOLIC BLOOD PRESSURE: 78 MMHG | RESPIRATION RATE: 18 BRPM | HEIGHT: 62 IN

## 2024-10-23 VITALS
RESPIRATION RATE: 18 BRPM | SYSTOLIC BLOOD PRESSURE: 112 MMHG | WEIGHT: 125 LBS | TEMPERATURE: 98.2 F | BODY MASS INDEX: 23 KG/M2 | OXYGEN SATURATION: 94 % | DIASTOLIC BLOOD PRESSURE: 66 MMHG | HEART RATE: 81 BPM | HEIGHT: 62 IN

## 2024-10-23 DIAGNOSIS — C50.919 MALIGNANT NEOPLASM OF BREAST IN FEMALE, ESTROGEN RECEPTOR NEGATIVE, UNSPECIFIED LATERALITY, UNSPECIFIED SITE OF BREAST (HCC): ICD-10-CM

## 2024-10-23 DIAGNOSIS — Z17.1 MALIGNANT NEOPLASM OF BREAST IN FEMALE, ESTROGEN RECEPTOR NEGATIVE, UNSPECIFIED LATERALITY, UNSPECIFIED SITE OF BREAST (HCC): ICD-10-CM

## 2024-10-23 DIAGNOSIS — C50.919 METASTASIS FROM BREAST CANCER (HCC): ICD-10-CM

## 2024-10-23 DIAGNOSIS — Z51.11 ENCOUNTER FOR ANTINEOPLASTIC CHEMOTHERAPY: Primary | ICD-10-CM

## 2024-10-23 DIAGNOSIS — C79.51 CARCINOMA OF BREAST METASTATIC TO BONE, UNSPECIFIED LATERALITY (HCC): ICD-10-CM

## 2024-10-23 DIAGNOSIS — C78.01 MALIGNANT NEOPLASM METASTATIC TO BOTH LUNGS (HCC): Primary | ICD-10-CM

## 2024-10-23 DIAGNOSIS — C50.919 CARCINOMA OF BREAST METASTATIC TO BONE, UNSPECIFIED LATERALITY (HCC): ICD-10-CM

## 2024-10-23 DIAGNOSIS — Z51.11 ENCOUNTER FOR ANTINEOPLASTIC CHEMOTHERAPY: ICD-10-CM

## 2024-10-23 DIAGNOSIS — C79.9 METASTASIS FROM BREAST CANCER (HCC): ICD-10-CM

## 2024-10-23 DIAGNOSIS — C78.02 MALIGNANT NEOPLASM METASTATIC TO BOTH LUNGS (HCC): Primary | ICD-10-CM

## 2024-10-23 LAB
ALBUMIN SERPL-MCNC: 2.8 G/DL (ref 3.5–5)
ALBUMIN/GLOB SERPL: 0.5 (ref 1.1–2.2)
ALP SERPL-CCNC: 92 U/L (ref 45–117)
ALT SERPL-CCNC: 14 U/L (ref 12–78)
ANION GAP SERPL CALC-SCNC: 3 MMOL/L (ref 2–12)
AST SERPL-CCNC: 47 U/L (ref 15–37)
BACTERIA SPEC CULT: NORMAL
BACTERIA SPEC CULT: NORMAL
BASOPHILS # BLD: 0.1 K/UL (ref 0–0.1)
BASOPHILS NFR BLD: 1 % (ref 0–1)
BILIRUB SERPL-MCNC: 0.2 MG/DL (ref 0.2–1)
BUN SERPL-MCNC: 12 MG/DL (ref 6–20)
BUN/CREAT SERPL: 21 (ref 12–20)
CALCIUM SERPL-MCNC: 8.9 MG/DL (ref 8.5–10.1)
CHLORIDE SERPL-SCNC: 104 MMOL/L (ref 97–108)
CO2 SERPL-SCNC: 30 MMOL/L (ref 21–32)
CREAT SERPL-MCNC: 0.58 MG/DL (ref 0.55–1.02)
DIFFERENTIAL METHOD BLD: ABNORMAL
EOSINOPHIL # BLD: 0 K/UL (ref 0–0.4)
EOSINOPHIL NFR BLD: 0 % (ref 0–7)
ERYTHROCYTE [DISTWIDTH] IN BLOOD BY AUTOMATED COUNT: 20.8 % (ref 11.5–14.5)
GLOBULIN SER CALC-MCNC: 5.1 G/DL (ref 2–4)
GLUCOSE SERPL-MCNC: 153 MG/DL (ref 65–100)
HCT VFR BLD AUTO: 32.4 % (ref 35–47)
HGB BLD-MCNC: 9.6 G/DL (ref 11.5–16)
IMM GRANULOCYTES # BLD AUTO: 0.1 K/UL (ref 0–0.04)
IMM GRANULOCYTES NFR BLD AUTO: 1 % (ref 0–0.5)
LYMPHOCYTES # BLD: 1.2 K/UL (ref 0.8–3.5)
LYMPHOCYTES NFR BLD: 21 % (ref 12–49)
MCH RBC QN AUTO: 25.9 PG (ref 26–34)
MCHC RBC AUTO-ENTMCNC: 29.6 G/DL (ref 30–36.5)
MCV RBC AUTO: 87.6 FL (ref 80–99)
MONOCYTES # BLD: 0.4 K/UL (ref 0–1)
MONOCYTES NFR BLD: 8 % (ref 5–13)
NEUTS SEG # BLD: 3.8 K/UL (ref 1.8–8)
NEUTS SEG NFR BLD: 69 % (ref 32–75)
NRBC # BLD: 0 K/UL (ref 0–0.01)
NRBC BLD-RTO: 0 PER 100 WBC
PLATELET # BLD AUTO: 401 K/UL (ref 150–400)
PMV BLD AUTO: 8.4 FL (ref 8.9–12.9)
POTASSIUM SERPL-SCNC: 3.3 MMOL/L (ref 3.5–5.1)
PROT SERPL-MCNC: 7.9 G/DL (ref 6.4–8.2)
RBC # BLD AUTO: 3.7 M/UL (ref 3.8–5.2)
RBC MORPH BLD: ABNORMAL
RBC MORPH BLD: ABNORMAL
SERVICE CMNT-IMP: NORMAL
SERVICE CMNT-IMP: NORMAL
SODIUM SERPL-SCNC: 137 MMOL/L (ref 136–145)
WBC # BLD AUTO: 5.6 K/UL (ref 3.6–11)

## 2024-10-23 PROCEDURE — 99215 OFFICE O/P EST HI 40 MIN: CPT | Performed by: INTERNAL MEDICINE

## 2024-10-23 PROCEDURE — 6360000002 HC RX W HCPCS: Performed by: INTERNAL MEDICINE

## 2024-10-23 PROCEDURE — 2580000003 HC RX 258: Performed by: INTERNAL MEDICINE

## 2024-10-23 PROCEDURE — 96409 CHEMO IV PUSH SNGL DRUG: CPT

## 2024-10-23 PROCEDURE — 96413 CHEMO IV INFUSION 1 HR: CPT

## 2024-10-23 PROCEDURE — 96417 CHEMO IV INFUS EACH ADDL SEQ: CPT

## 2024-10-23 PROCEDURE — 80053 COMPREHEN METABOLIC PANEL: CPT

## 2024-10-23 PROCEDURE — 36415 COLL VENOUS BLD VENIPUNCTURE: CPT

## 2024-10-23 PROCEDURE — 85025 COMPLETE CBC W/AUTO DIFF WBC: CPT

## 2024-10-23 RX ORDER — SODIUM CHLORIDE 0.9 % (FLUSH) 0.9 %
5-40 SYRINGE (ML) INJECTION PRN
OUTPATIENT
Start: 2024-10-30

## 2024-10-23 RX ORDER — SODIUM CHLORIDE 9 MG/ML
5-250 INJECTION, SOLUTION INTRAVENOUS PRN
Status: DISCONTINUED | OUTPATIENT
Start: 2024-10-23 | End: 2024-10-24 | Stop reason: HOSPADM

## 2024-10-23 RX ORDER — ALBUTEROL SULFATE 90 UG/1
4 INHALANT RESPIRATORY (INHALATION) PRN
OUTPATIENT
Start: 2024-10-30

## 2024-10-23 RX ORDER — PROCHLORPERAZINE EDISYLATE 5 MG/ML
10 INJECTION INTRAMUSCULAR; INTRAVENOUS EVERY 6 HOURS PRN
OUTPATIENT
Start: 2024-10-30

## 2024-10-23 RX ORDER — MEPERIDINE HYDROCHLORIDE 50 MG/ML
12.5 INJECTION INTRAMUSCULAR; INTRAVENOUS; SUBCUTANEOUS PRN
OUTPATIENT
Start: 2024-10-30

## 2024-10-23 RX ORDER — SODIUM CHLORIDE 0.9 % (FLUSH) 0.9 %
5-40 SYRINGE (ML) INJECTION PRN
Status: DISCONTINUED | OUTPATIENT
Start: 2024-10-23 | End: 2024-10-24 | Stop reason: HOSPADM

## 2024-10-23 RX ORDER — EPINEPHRINE 1 MG/ML
0.3 INJECTION, SOLUTION, CONCENTRATE INTRAVENOUS PRN
OUTPATIENT
Start: 2024-10-30

## 2024-10-23 RX ORDER — HEPARIN SODIUM (PORCINE) LOCK FLUSH IV SOLN 100 UNIT/ML 100 UNIT/ML
500 SOLUTION INTRAVENOUS PRN
OUTPATIENT
Start: 2024-10-30

## 2024-10-23 RX ORDER — FAMOTIDINE 10 MG/ML
20 INJECTION, SOLUTION INTRAVENOUS
OUTPATIENT
Start: 2024-10-30

## 2024-10-23 RX ORDER — ACETAMINOPHEN 325 MG/1
650 TABLET ORAL
OUTPATIENT
Start: 2024-10-30

## 2024-10-23 RX ORDER — SODIUM CHLORIDE 9 MG/ML
5-250 INJECTION, SOLUTION INTRAVENOUS PRN
OUTPATIENT
Start: 2024-10-30

## 2024-10-23 RX ORDER — ONDANSETRON 2 MG/ML
8 INJECTION INTRAMUSCULAR; INTRAVENOUS
OUTPATIENT
Start: 2024-10-30

## 2024-10-23 RX ORDER — SODIUM CHLORIDE 9 MG/ML
INJECTION, SOLUTION INTRAVENOUS CONTINUOUS
OUTPATIENT
Start: 2024-10-30

## 2024-10-23 RX ORDER — DIPHENHYDRAMINE HYDROCHLORIDE 50 MG/ML
50 INJECTION INTRAMUSCULAR; INTRAVENOUS
OUTPATIENT
Start: 2024-10-30

## 2024-10-23 RX ADMIN — SODIUM CHLORIDE, PRESERVATIVE FREE 20 ML: 5 INJECTION INTRAVENOUS at 14:43

## 2024-10-23 RX ADMIN — VINORELBINE 39 MG: 10 INJECTION, SOLUTION INTRAVENOUS at 13:14

## 2024-10-23 RX ADMIN — SODIUM CHLORIDE 336 MG: 9 INJECTION, SOLUTION INTRAVENOUS at 14:02

## 2024-10-23 RX ADMIN — SODIUM CHLORIDE 25 ML/HR: 9 INJECTION, SOLUTION INTRAVENOUS at 13:08

## 2024-10-23 ASSESSMENT — PATIENT HEALTH QUESTIONNAIRE - PHQ9
SUM OF ALL RESPONSES TO PHQ9 QUESTIONS 1 & 2: 0
2. FEELING DOWN, DEPRESSED OR HOPELESS: NOT AT ALL
SUM OF ALL RESPONSES TO PHQ QUESTIONS 1-9: 0
1. LITTLE INTEREST OR PLEASURE IN DOING THINGS: NOT AT ALL
SUM OF ALL RESPONSES TO PHQ QUESTIONS 1-9: 0

## 2024-10-23 ASSESSMENT — PAIN SCALES - GENERAL: PAINLEVEL_OUTOF10: 0

## 2024-10-23 NOTE — PROGRESS NOTES
Date: October 23, 2024      Ms. Lobito Moser Arrived to [facility] for  [place] and [place] in a wheelchair with supplemental oxygen via nasal cannula in stable condition.  Assessment was completed with the assistance of  and port accessed by Jerilyn RAY. Labs drawn and sent for processing. Went to provider appointment with Medical Oncology. Patient discharged from ED yesterday, ok to treat today per MD.     Returned from provider appointment. Labs reviewed. Criteria for treatment was met.    Ms. Lobito Moser's vitals were reviewed.  Patient Vitals for the past 12 hrs:   Temp Pulse Resp BP SpO2   10/23/24 1002 98.2 °F (36.8 °C) 89 18 128/78 94 %         Lab results were obtained and reviewed.  Recent Results (from the past 12 hour(s))   Comprehensive metabolic panel    Collection Time: 10/23/24 10:21 AM   Result Value Ref Range    Sodium 137 136 - 145 mmol/L    Potassium 3.3 (L) 3.5 - 5.1 mmol/L    Chloride 104 97 - 108 mmol/L    CO2 30 21 - 32 mmol/L    Anion Gap 3 2 - 12 mmol/L    Glucose 153 (H) 65 - 100 mg/dL    BUN 12 6 - 20 MG/DL    Creatinine 0.58 0.55 - 1.02 MG/DL    BUN/Creatinine Ratio 21 (H) 12 - 20      Est, Glom Filt Rate >90 >60 ml/min/1.73m2    Calcium 8.9 8.5 - 10.1 MG/DL    Total Bilirubin 0.2 0.2 - 1.0 MG/DL    ALT 14 12 - 78 U/L    AST 47 (H) 15 - 37 U/L    Alk Phosphatase 92 45 - 117 U/L    Total Protein 7.9 6.4 - 8.2 g/dL    Albumin 2.8 (L) 3.5 - 5.0 g/dL    Globulin 5.1 (H) 2.0 - 4.0 g/dL    Albumin/Globulin Ratio 0.5 (L) 1.1 - 2.2     CBC With Auto Differential    Collection Time: 10/23/24 10:21 AM   Result Value Ref Range    WBC 5.6 3.6 - 11.0 K/uL    RBC 3.70 (L) 3.80 - 5.20 M/uL    Hemoglobin 9.6 (L) 11.5 - 16.0 g/dL    Hematocrit 32.4 (L) 35.0 - 47.0 %    MCV 87.6 80.0 - 99.0 FL    MCH 25.9 (L) 26.0 - 34.0 PG    MCHC 29.6 (L) 30.0 - 36.5 g/dL    RDW 20.8 (H) 11.5 - 14.5 %    Platelets 401 (H) 150 - 400 K/uL    MPV 8.4 (L) 8.9 - 12.9 FL    Nucleated RBCs 0.0 0  WBC

## 2024-10-23 NOTE — PROGRESS NOTES
Debra Moser is a 64 y.o. female is here for a breast cancer follow up.    1. Have you been to the ER, urgent care clinic since your last visit?  Hospitalized since your last visit?Yes When: 10/17/2024  Where: [facility]  Reason for visit: pneumonia     2. Have you seen or consulted any other health care providers outside of the [facility] since your last visit?  Include any pap smears or colon screening. No       10/23/2024  10:02 AM   Vitals    SYSTOLIC 128    DIASTOLIC 78    Site    Position    Pulse 89    Temp 98.2 °F (36.8 °C)    Respirations 18    SpO2 94 %

## 2024-10-23 NOTE — PROGRESS NOTES
[facility]cer [place] at [facility]  [address] [facility] [address]  W: [phone]  F: [phone]      Reason for Visit:   Debra Moser is a 64 y.o. female who has transferred to me from [place].    Treatment History:   2016:  Original right stage 3 breast cancer, s/p mastectomy with 5 cm IDC, 3/19 LN, ER negative, PA negative, HER 2 +, treated with chemotherapy (unknown) and XRT (in [facility])  9/2018:  recurrent breast cancer to her neck, tx with docetaxel q 3 weeks x 9, completed 4/12/2019 (PD) ([facility])  6/25/19 L ax LN FNA:  poorly differentiated carcinoma, c/w breast cancer, ER negative, PA negative, HER 2 positive at IHC 3+  Tempus testing:  HER 2 overexpression  7/26/19 Trastuzumab/pertuzumab/paclitaxel until 7/2021 after 32 cycles (PD) ([place], [place])  XRT to right neck 1/2021  T-DM1 8/10/21- T-Dxd  T-Dxd 7/2022- 9/26/23   Hold 9/26/23 due to gr 1 ILD - discontinued due to continued concern for chronic ILD on CT  Right shoulder dislocation and infection 11/17/22  Zometa 9/5/2023 - current  Bronch, BAL, malignant cells present, compatible with carcinoma, not able to do receptors  Trastuzumab 11/21/23 - 1/23/24 (PD)  Tucatinib 11/13/23 - 2/13/24 (PD)  Capecitabine 11/23/23 - 2/13/24 (PD)  T-Dxd 2/20/24- 6/4/24 (PD)  Margetuximab + eribulin 6/18/24- 9/6/24 (PD)  Right thoracentesis 6/14/24 1500 mL removed - + for metastatic carcinoma, ER/PA -, HER2 + by IHC 3+  9/17/24 right neck mass, bx:  metastatic breast cancer, ER negative, PA negative, HER 2 + at IHC 3+, ki67 40%; left neck biopsy:  metastatic breast cancer, ER negative, PA negative, HER 2 positive at IHC 3+  Vinorelbine and trastuzumab 10/2/24-    History of Present Illness:   Recently moved to [place] live [place].    Interval Hx: Patient presents today for follow up and treatment.  Gr 1 loss of appetite, gr 2 fatigue, gr 2 cough, gr 2 sob, gr 2 itching, gr 2 neuropathy    Entire visit was done with

## 2024-10-29 ENCOUNTER — APPOINTMENT (OUTPATIENT)
Facility: HOSPITAL | Age: 64
End: 2024-10-29
Payer: MEDICAID

## 2024-10-30 ENCOUNTER — OFFICE VISIT (OUTPATIENT)
Age: 64
End: 2024-10-30
Payer: MEDICAID

## 2024-10-30 ENCOUNTER — HOSPITAL ENCOUNTER (OUTPATIENT)
Facility: HOSPITAL | Age: 64
Setting detail: INFUSION SERIES
Discharge: HOME | End: 2024-10-30
Payer: MEDICAID

## 2024-10-30 VITALS
HEART RATE: 91 BPM | BODY MASS INDEX: 23.53 KG/M2 | DIASTOLIC BLOOD PRESSURE: 67 MMHG | OXYGEN SATURATION: 97 % | SYSTOLIC BLOOD PRESSURE: 117 MMHG | HEIGHT: 62 IN | RESPIRATION RATE: 18 BRPM | WEIGHT: 127.9 LBS | TEMPERATURE: 98.1 F

## 2024-10-30 VITALS
RESPIRATION RATE: 18 BRPM | BODY MASS INDEX: 23.48 KG/M2 | WEIGHT: 127.6 LBS | DIASTOLIC BLOOD PRESSURE: 65 MMHG | TEMPERATURE: 98.1 F | HEART RATE: 80 BPM | HEIGHT: 62 IN | SYSTOLIC BLOOD PRESSURE: 134 MMHG | OXYGEN SATURATION: 97 %

## 2024-10-30 DIAGNOSIS — C50.919 METASTASIS FROM BREAST CANCER (HCC): Primary | ICD-10-CM

## 2024-10-30 DIAGNOSIS — Z86.711 HISTORY OF PULMONARY EMBOLISM: ICD-10-CM

## 2024-10-30 DIAGNOSIS — C50.919 MALIGNANT NEOPLASM OF BREAST IN FEMALE, ESTROGEN RECEPTOR NEGATIVE, UNSPECIFIED LATERALITY, UNSPECIFIED SITE OF BREAST (HCC): ICD-10-CM

## 2024-10-30 DIAGNOSIS — Z51.11 ENCOUNTER FOR ANTINEOPLASTIC CHEMOTHERAPY: ICD-10-CM

## 2024-10-30 DIAGNOSIS — C50.919 CARCINOMA OF BREAST METASTATIC TO BONE, UNSPECIFIED LATERALITY (HCC): ICD-10-CM

## 2024-10-30 DIAGNOSIS — C78.02 MALIGNANT NEOPLASM METASTATIC TO BOTH LUNGS (HCC): Primary | ICD-10-CM

## 2024-10-30 DIAGNOSIS — Z17.1 MALIGNANT NEOPLASM OF BREAST IN FEMALE, ESTROGEN RECEPTOR NEGATIVE, UNSPECIFIED LATERALITY, UNSPECIFIED SITE OF BREAST (HCC): ICD-10-CM

## 2024-10-30 DIAGNOSIS — C79.9 METASTASIS FROM BREAST CANCER (HCC): Primary | ICD-10-CM

## 2024-10-30 DIAGNOSIS — C78.01 MALIGNANT NEOPLASM METASTATIC TO BOTH LUNGS (HCC): Primary | ICD-10-CM

## 2024-10-30 DIAGNOSIS — C79.51 CARCINOMA OF BREAST METASTATIC TO BONE, UNSPECIFIED LATERALITY (HCC): ICD-10-CM

## 2024-10-30 LAB
BASOPHILS # BLD: 0.2 K/UL (ref 0–0.1)
BASOPHILS NFR BLD: 2 % (ref 0–1)
DIFFERENTIAL METHOD BLD: ABNORMAL
EOSINOPHIL # BLD: 0 K/UL (ref 0–0.4)
EOSINOPHIL NFR BLD: 0 % (ref 0–7)
ERYTHROCYTE [DISTWIDTH] IN BLOOD BY AUTOMATED COUNT: 19.9 % (ref 11.5–14.5)
HCT VFR BLD AUTO: 30.4 % (ref 35–47)
HGB BLD-MCNC: 9.1 G/DL (ref 11.5–16)
IMM GRANULOCYTES # BLD AUTO: 0.2 K/UL (ref 0–0.04)
IMM GRANULOCYTES NFR BLD AUTO: 2 % (ref 0–0.5)
LYMPHOCYTES # BLD: 0.6 K/UL (ref 0.8–3.5)
LYMPHOCYTES NFR BLD: 7 % (ref 12–49)
MCH RBC QN AUTO: 26 PG (ref 26–34)
MCHC RBC AUTO-ENTMCNC: 29.9 G/DL (ref 30–36.5)
MCV RBC AUTO: 86.9 FL (ref 80–99)
MONOCYTES # BLD: 0.2 K/UL (ref 0–1)
MONOCYTES NFR BLD: 2 % (ref 5–13)
NEUTS SEG # BLD: 6.9 K/UL (ref 1.8–8)
NEUTS SEG NFR BLD: 87 % (ref 32–75)
NRBC # BLD: 0 K/UL (ref 0–0.01)
NRBC BLD-RTO: 0 PER 100 WBC
PLATELET # BLD AUTO: 374 K/UL (ref 150–400)
PMV BLD AUTO: 9 FL (ref 8.9–12.9)
RBC # BLD AUTO: 3.5 M/UL (ref 3.8–5.2)
RBC MORPH BLD: ABNORMAL
RBC MORPH BLD: ABNORMAL
WBC # BLD AUTO: 8.1 K/UL (ref 3.6–11)

## 2024-10-30 PROCEDURE — 85025 COMPLETE CBC W/AUTO DIFF WBC: CPT

## 2024-10-30 PROCEDURE — 6360000002 HC RX W HCPCS: Performed by: INTERNAL MEDICINE

## 2024-10-30 PROCEDURE — 2580000003 HC RX 258: Performed by: INTERNAL MEDICINE

## 2024-10-30 PROCEDURE — 99215 OFFICE O/P EST HI 40 MIN: CPT | Performed by: INTERNAL MEDICINE

## 2024-10-30 PROCEDURE — 96409 CHEMO IV PUSH SNGL DRUG: CPT

## 2024-10-30 RX ORDER — BENZONATATE 100 MG/1
100 CAPSULE ORAL 3 TIMES DAILY PRN
Qty: 30 CAPSULE | Refills: 5 | Status: SHIPPED | OUTPATIENT
Start: 2024-10-30 | End: 2024-12-29

## 2024-10-30 RX ORDER — SODIUM CHLORIDE 9 MG/ML
5-250 INJECTION, SOLUTION INTRAVENOUS PRN
Status: DISCONTINUED | OUTPATIENT
Start: 2024-10-30 | End: 2024-10-31 | Stop reason: HOSPADM

## 2024-10-30 RX ORDER — HYDROXYZINE HYDROCHLORIDE 25 MG/1
25 TABLET, FILM COATED ORAL EVERY 8 HOURS PRN
Qty: 30 TABLET | Refills: 3 | Status: SHIPPED | OUTPATIENT
Start: 2024-10-30 | End: 2024-12-09

## 2024-10-30 RX ADMIN — VINORELBINE 39 MG: 10 INJECTION, SOLUTION INTRAVENOUS at 13:14

## 2024-10-30 RX ADMIN — SODIUM CHLORIDE 353 ML/HR: 9 INJECTION, SOLUTION INTRAVENOUS at 13:39

## 2024-10-30 ASSESSMENT — PATIENT HEALTH QUESTIONNAIRE - PHQ9
SUM OF ALL RESPONSES TO PHQ QUESTIONS 1-9: 0
SUM OF ALL RESPONSES TO PHQ QUESTIONS 1-9: 0
1. LITTLE INTEREST OR PLEASURE IN DOING THINGS: NOT AT ALL
SUM OF ALL RESPONSES TO PHQ QUESTIONS 1-9: 0
SUM OF ALL RESPONSES TO PHQ QUESTIONS 1-9: 0
2. FEELING DOWN, DEPRESSED OR HOPELESS: NOT AT ALL
SUM OF ALL RESPONSES TO PHQ9 QUESTIONS 1 & 2: 0

## 2024-10-30 ASSESSMENT — PAIN SCALES - GENERAL: PAINLEVEL_OUTOF10: 0

## 2024-10-30 ASSESSMENT — PAIN SCALES - WONG BAKER: WONGBAKER_NUMERICALRESPONSE: NO HURT

## 2024-10-30 NOTE — PROGRESS NOTES
Cancer [place] at [facility]  [address] [facility] [address]  W: [phone]  F: [phone]      Reason for Visit:   Debra Moser is a 64 y.o. female who has transferred to me from [place].    Treatment History:   2016:  Original right stage 3 breast cancer, s/p mastectomy with 5 cm IDC, 3/19 LN, ER negative, ND negative, HER 2 +, treated with chemotherapy (unknown) and XRT (in [facility])  9/2018:  recurrent breast cancer to her neck, tx with docetaxel q 3 weeks x 9, completed 4/12/2019 (PD) ([facility])  6/25/19 L ax LN FNA:  poorly differentiated carcinoma, c/w breast cancer, ER negative, ND negative, HER 2 positive at IHC 3+  Tempus testing:  HER 2 overexpression  7/26/19 Trastuzumab/pertuzumab/paclitaxel until 7/2021 after 32 cycles (PD) ([place], [place])  XRT to right neck 1/2021  T-DM1 8/10/21- T-Dxd  T-Dxd 7/2022- 9/26/23   Hold 9/26/23 due to gr 1 ILD - discontinued due to continued concern for chronic ILD on CT  Right shoulder dislocation and infection 11/17/22  Zometa 9/5/2023 - current  Bronch, BAL, malignant cells present, compatible with carcinoma, not able to do receptors  Trastuzumab 11/21/23 - 1/23/24 (PD)  Tucatinib 11/13/23 - 2/13/24 (PD)  Capecitabine 11/23/23 - 2/13/24 (PD)  T-Dxd 2/20/24- 6/4/24 (PD)  Margetuximab + eribulin 6/18/24- 9/6/24 (PD)  Right thoracentesis 6/14/24 1500 mL removed - + for metastatic carcinoma, ER/ND -, HER2 + by IHC 3+  9/17/24 right neck mass, bx:  metastatic breast cancer, ER negative, ND negative, HER 2 + at IHC 3+, ki67 40%; left neck biopsy:  metastatic breast cancer, ER negative, ND negative, HER 2 positive at IHC 3+  Vinorelbine and trastuzumab 10/2/24-    History of Present Illness:   Recently moved to [place] to live with [place].    Interval Hx: Patient presents today for follow up and treatment.  Gr 1 constipation, gr 2 cough, gr 1 sob, gr 3 itching    Entire visit was done with Michelle from [facility]

## 2024-10-30 NOTE — PROGRESS NOTES
[facility]patient [facility]fusion Center Progress Note        Date: 10/30/24    Name: Debra Moser    MRN: [id number]         : 1960    MD: Doyle Underwood MD       Ms. Lobito Moser admitted to [facility] for C2D8 of Vinorelbine ambulatory in stable condition. Assessment completed using [facility]. No new concerns voiced.  Left port a cath accessed using 0.75\" woody, blood return obtained and port flushed without difficulty.  Labs drawn and sent for processing.    Patient proceeded to appointment with Dr. Underwood's team.    ** Patient has received this medication in the past. Patient reports no previous reactions to the medication(s).       Vitals:    10/30/24 1030 10/30/24 1343   BP: 117/67 134/65   Pulse: 91 80   Resp: 18    Temp: 98.1 °F (36.7 °C)    TempSrc: Temporal    SpO2: 97%    Weight: 57.9 kg (127 lb 9.6 oz)    Height: 1.575 m (5' 2\")            Results for orders placed or performed during the hospital encounter of 10/30/24   CBC With Auto Differential   Result Value Ref Range    WBC 8.1 3.6 - 11.0 K/uL    RBC 3.50 (L) 3.80 - 5.20 M/uL    Hemoglobin 9.1 (L) 11.5 - 16.0 g/dL    Hematocrit 30.4 (L) 35.0 - 47.0 %    MCV 86.9 80.0 - 99.0 FL    MCH 26.0 26.0 - 34.0 PG    MCHC 29.9 (L) 30.0 - 36.5 g/dL    RDW 19.9 (H) 11.5 - 14.5 %    Platelets 374 150 - 400 K/uL    MPV 9.0 8.9 - 12.9 FL    Nucleated RBCs 0.0 0  WBC    nRBC 0.00 0.00 - 0.01 K/uL    Neutrophils % 87 (H) 32 - 75 %    Lymphocytes % 7 (L) 12 - 49 %    Monocytes % 2 (L) 5 - 13 %    Eosinophils % 0 0 - 7 %    Basophils % 2 (H) 0 - 1 %    Immature Granulocytes % 2 (H) 0.0 - 0.5 %    Neutrophils Absolute 6.9 1.8 - 8.0 K/UL    Lymphocytes Absolute 0.6 (L) 0.8 - 3.5 K/UL    Monocytes Absolute 0.2 0.0 - 1.0 K/UL    Eosinophils Absolute 0.0 0.0 - 0.4 K/UL    Basophils Absolute 0.2 (H) 0.0 - 0.1 K/UL    Immature Granulocytes Absolute 0.2 (H) 0.00 - 0.04 K/UL    Differential Type SMEAR SCANNED      RBC Comment ANISOCYTOSIS  1+        RBC Comment  STOMATOCYTES  PRESENT                 Medications:  MEDICATIONS GIVEN:  Medications Administered         0.9 % sodium chloride infusion Admin Date  10/30/2024 Action  New Bag Dose  353 mL/hr Rate  353 mL/hr Route  IntraVENous Documented By  Ifrah Haddad RN        vinORELbine (NAVELBINE) 39 mg in sodium chloride 0.9 % 50 mL chemo IVPB Admin Date  10/30/2024 Action  New Bag Dose  39 mg Rate  353.4 mL/hr Route  IntraVENous Documented By  Ifrah Haddad RN              Two nurses verified prior to administering:    Drug name  Drug dose  Infusion volume or drug volume when prepared in a syringe  Rate of administration  Route of administration  Expiration dates and/or times  Appearance and physical integrity of the drugs  Rate set on infusion pump, when used  Sequencing of drug administration      ~ 90 ml flush administered post infusion per order (remainder of 100 ml saline bag)      Post-Infusion Vitals:  Vitals:    10/30/24 1343   BP: 134/65   Pulse: 80   Resp:    Temp:    SpO2:            Pt tolerated treatment well. Port maintained positive blood return throughout treatment, flushed with positive blood return at conclusion and de-accessed per protocol. D/c home ambulatory in no distress. Patient is aware of next appointment on 11/13/24 @ 1030 at the [facility] location.        Future Appointments:  Future Appointments   Date Time Provider Department Center   11/13/2024 10:30 AM SS CHEMO CHAIR 13 [facility]   11/13/2024 11:00 AM Doyle Underwood MD [facility]   11/20/2024 10:30 AM SS CHEMO CHAIR 13 [facility]   12/4/2024 10:30 AM SS CHEMO CHAIR 13 [facility]   12/11/2024 10:30 AM SS CHEMO CHAIR 13 [facility]   12/24/2024 10:30 AM SS CHEMO CHAIR 13 [facility]

## 2024-10-30 NOTE — PROGRESS NOTES
Debra Moser is a 64 y.o. female is here today for a breast cancer follow up.    1. Have you been to the ER, urgent care clinic since your last visit?  Hospitalized since your last visit?No    2. Have you seen or consulted any other health care providers outside of the [facility] since your last visit?  Include any pap smears or colon screening. No       10/30/2024  10:30 AM   Vitals    SYSTOLIC 117    DIASTOLIC 67    Site    Position    Pulse 91    Temp 98.1 °F (36.7 °C)    Respirations 18    SpO2 97 %    Weight - Scale 127 lb 9.6 oz    Height 5' 2\"    Body Mass Index 23.34 kg/m2    Pain Score    Pain Loc    Pain Level 0

## 2024-11-05 ENCOUNTER — APPOINTMENT (OUTPATIENT)
Facility: HOSPITAL | Age: 64
End: 2024-11-05
Payer: MEDICAID

## 2024-11-06 RX ORDER — FAMOTIDINE 10 MG/ML
20 INJECTION, SOLUTION INTRAVENOUS
Status: CANCELLED | OUTPATIENT
Start: 2024-11-13

## 2024-11-06 RX ORDER — SODIUM CHLORIDE 9 MG/ML
INJECTION, SOLUTION INTRAVENOUS CONTINUOUS
Status: CANCELLED | OUTPATIENT
Start: 2024-11-13

## 2024-11-06 RX ORDER — MEPERIDINE HYDROCHLORIDE 25 MG/ML
12.5 INJECTION INTRAMUSCULAR; INTRAVENOUS; SUBCUTANEOUS PRN
Status: CANCELLED | OUTPATIENT
Start: 2024-11-13

## 2024-11-06 RX ORDER — ONDANSETRON 2 MG/ML
8 INJECTION INTRAMUSCULAR; INTRAVENOUS
Status: CANCELLED | OUTPATIENT
Start: 2024-11-13

## 2024-11-06 RX ORDER — DIPHENHYDRAMINE HYDROCHLORIDE 50 MG/ML
50 INJECTION INTRAMUSCULAR; INTRAVENOUS
Status: CANCELLED | OUTPATIENT
Start: 2024-11-13

## 2024-11-06 RX ORDER — PROCHLORPERAZINE EDISYLATE 5 MG/ML
10 INJECTION INTRAMUSCULAR; INTRAVENOUS EVERY 6 HOURS PRN
Status: CANCELLED | OUTPATIENT
Start: 2024-11-13

## 2024-11-06 RX ORDER — ACETAMINOPHEN 325 MG/1
650 TABLET ORAL
Status: CANCELLED | OUTPATIENT
Start: 2024-11-13

## 2024-11-06 RX ORDER — HYDROCORTISONE SODIUM SUCCINATE 100 MG/2ML
100 INJECTION INTRAMUSCULAR; INTRAVENOUS
Status: CANCELLED | OUTPATIENT
Start: 2024-11-13

## 2024-11-06 RX ORDER — SODIUM CHLORIDE 0.9 % (FLUSH) 0.9 %
5-40 SYRINGE (ML) INJECTION PRN
Status: CANCELLED | OUTPATIENT
Start: 2024-11-13

## 2024-11-06 RX ORDER — ALBUTEROL SULFATE 90 UG/1
4 INHALANT RESPIRATORY (INHALATION) PRN
Status: CANCELLED | OUTPATIENT
Start: 2024-11-13

## 2024-11-06 RX ORDER — HEPARIN 100 UNIT/ML
500 SYRINGE INTRAVENOUS PRN
Status: CANCELLED | OUTPATIENT
Start: 2024-11-13

## 2024-11-06 RX ORDER — EPINEPHRINE 1 MG/ML
0.3 INJECTION, SOLUTION INTRAMUSCULAR; SUBCUTANEOUS PRN
Status: CANCELLED | OUTPATIENT
Start: 2024-11-13

## 2024-11-06 RX ORDER — SODIUM CHLORIDE 9 MG/ML
5-250 INJECTION, SOLUTION INTRAVENOUS PRN
Status: CANCELLED | OUTPATIENT
Start: 2024-11-13

## 2024-11-12 ENCOUNTER — APPOINTMENT (OUTPATIENT)
Facility: HOSPITAL | Age: 64
End: 2024-11-12

## 2024-11-13 ENCOUNTER — OFFICE VISIT (OUTPATIENT)
Age: 64
End: 2024-11-13
Payer: MEDICAID

## 2024-11-13 ENCOUNTER — HOSPITAL ENCOUNTER (OUTPATIENT)
Facility: HOSPITAL | Age: 64
Setting detail: INFUSION SERIES
Discharge: HOME | End: 2024-11-13
Payer: MEDICAID

## 2024-11-13 ENCOUNTER — HOSPITAL ENCOUNTER (OUTPATIENT)
Facility: HOSPITAL | Age: 64
Discharge: HOME | End: 2024-11-16

## 2024-11-13 VITALS
RESPIRATION RATE: 18 BRPM | HEART RATE: 88 BPM | BODY MASS INDEX: 23.55 KG/M2 | HEIGHT: 62 IN | TEMPERATURE: 99 F | SYSTOLIC BLOOD PRESSURE: 118 MMHG | OXYGEN SATURATION: 94 % | WEIGHT: 128 LBS | DIASTOLIC BLOOD PRESSURE: 69 MMHG

## 2024-11-13 VITALS
RESPIRATION RATE: 18 BRPM | HEIGHT: 62 IN | TEMPERATURE: 99 F | OXYGEN SATURATION: 94 % | WEIGHT: 128 LBS | SYSTOLIC BLOOD PRESSURE: 118 MMHG | DIASTOLIC BLOOD PRESSURE: 69 MMHG | BODY MASS INDEX: 23.55 KG/M2 | HEART RATE: 88 BPM

## 2024-11-13 DIAGNOSIS — C50.919 CARCINOMA OF BREAST METASTATIC TO MULTIPLE SITES, UNSPECIFIED LATERALITY (HCC): ICD-10-CM

## 2024-11-13 DIAGNOSIS — C79.51 CARCINOMA OF BREAST METASTATIC TO BONE, UNSPECIFIED LATERALITY (HCC): ICD-10-CM

## 2024-11-13 DIAGNOSIS — C50.919 METASTASIS FROM BREAST CANCER (HCC): ICD-10-CM

## 2024-11-13 DIAGNOSIS — Z17.1 MALIGNANT NEOPLASM OF BREAST IN FEMALE, ESTROGEN RECEPTOR NEGATIVE, UNSPECIFIED LATERALITY, UNSPECIFIED SITE OF BREAST (HCC): Primary | ICD-10-CM

## 2024-11-13 DIAGNOSIS — C50.919 MALIGNANT NEOPLASM OF BREAST IN FEMALE, ESTROGEN RECEPTOR NEGATIVE, UNSPECIFIED LATERALITY, UNSPECIFIED SITE OF BREAST (HCC): ICD-10-CM

## 2024-11-13 DIAGNOSIS — Z17.1 MALIGNANT NEOPLASM OF BREAST IN FEMALE, ESTROGEN RECEPTOR NEGATIVE, UNSPECIFIED LATERALITY, UNSPECIFIED SITE OF BREAST (HCC): ICD-10-CM

## 2024-11-13 DIAGNOSIS — C78.02 MALIGNANT NEOPLASM METASTATIC TO BOTH LUNGS (HCC): Primary | ICD-10-CM

## 2024-11-13 DIAGNOSIS — C50.919 CARCINOMA OF BREAST METASTATIC TO BONE, UNSPECIFIED LATERALITY (HCC): ICD-10-CM

## 2024-11-13 DIAGNOSIS — C78.01 MALIGNANT NEOPLASM METASTATIC TO BOTH LUNGS (HCC): Primary | ICD-10-CM

## 2024-11-13 DIAGNOSIS — C50.919 MALIGNANT NEOPLASM OF BREAST IN FEMALE, ESTROGEN RECEPTOR NEGATIVE, UNSPECIFIED LATERALITY, UNSPECIFIED SITE OF BREAST (HCC): Primary | ICD-10-CM

## 2024-11-13 DIAGNOSIS — Z51.11 ENCOUNTER FOR ANTINEOPLASTIC CHEMOTHERAPY: Primary | ICD-10-CM

## 2024-11-13 DIAGNOSIS — C79.2 SECONDARY MALIGNANT NEOPLASM OF SKIN (HCC): ICD-10-CM

## 2024-11-13 DIAGNOSIS — C79.9 METASTASIS FROM BREAST CANCER (HCC): ICD-10-CM

## 2024-11-13 LAB
ALBUMIN SERPL-MCNC: 2.8 G/DL (ref 3.5–5)
ALBUMIN/GLOB SERPL: 0.6 (ref 1.1–2.2)
ALP SERPL-CCNC: 78 U/L (ref 45–117)
ALT SERPL-CCNC: 9 U/L (ref 12–78)
ANION GAP SERPL CALC-SCNC: 4 MMOL/L (ref 2–12)
AST SERPL-CCNC: 45 U/L (ref 15–37)
BASOPHILS # BLD: 0.1 K/UL (ref 0–0.1)
BASOPHILS NFR BLD: 1 % (ref 0–1)
BILIRUB SERPL-MCNC: 0.6 MG/DL (ref 0.2–1)
BUN SERPL-MCNC: 13 MG/DL (ref 6–20)
BUN/CREAT SERPL: 20 (ref 12–20)
CALCIUM SERPL-MCNC: 8.7 MG/DL (ref 8.5–10.1)
CHLORIDE SERPL-SCNC: 106 MMOL/L (ref 97–108)
CO2 SERPL-SCNC: 31 MMOL/L (ref 21–32)
CREAT SERPL-MCNC: 0.64 MG/DL (ref 0.55–1.02)
DIFFERENTIAL METHOD BLD: ABNORMAL
EOSINOPHIL # BLD: 0 K/UL (ref 0–0.4)
EOSINOPHIL NFR BLD: 0 % (ref 0–7)
ERYTHROCYTE [DISTWIDTH] IN BLOOD BY AUTOMATED COUNT: 20.5 % (ref 11.5–14.5)
GLOBULIN SER CALC-MCNC: 5 G/DL (ref 2–4)
GLUCOSE SERPL-MCNC: 162 MG/DL (ref 65–100)
HCT VFR BLD AUTO: 32.4 % (ref 35–47)
HGB BLD-MCNC: 9.6 G/DL (ref 11.5–16)
IMM GRANULOCYTES # BLD AUTO: 0 K/UL (ref 0–0.04)
IMM GRANULOCYTES NFR BLD AUTO: 0 % (ref 0–0.5)
LYMPHOCYTES # BLD: 0.8 K/UL (ref 0.8–3.5)
LYMPHOCYTES NFR BLD: 15 % (ref 12–49)
MCH RBC QN AUTO: 25.9 PG (ref 26–34)
MCHC RBC AUTO-ENTMCNC: 29.6 G/DL (ref 30–36.5)
MCV RBC AUTO: 87.6 FL (ref 80–99)
MONOCYTES # BLD: 0.4 K/UL (ref 0–1)
MONOCYTES NFR BLD: 8 % (ref 5–13)
NEUTS SEG # BLD: 4.2 K/UL (ref 1.8–8)
NEUTS SEG NFR BLD: 76 % (ref 32–75)
NRBC # BLD: 0 K/UL (ref 0–0.01)
NRBC BLD-RTO: 0 PER 100 WBC
PLATELET # BLD AUTO: 339 K/UL (ref 150–400)
PMV BLD AUTO: 8 FL (ref 8.9–12.9)
POTASSIUM SERPL-SCNC: 3.7 MMOL/L (ref 3.5–5.1)
PROT SERPL-MCNC: 7.8 G/DL (ref 6.4–8.2)
RBC # BLD AUTO: 3.7 M/UL (ref 3.8–5.2)
RBC MORPH BLD: ABNORMAL
RBC MORPH BLD: ABNORMAL
SODIUM SERPL-SCNC: 141 MMOL/L (ref 136–145)
WBC # BLD AUTO: 5.5 K/UL (ref 3.6–11)

## 2024-11-13 PROCEDURE — G2211 COMPLEX E/M VISIT ADD ON: HCPCS | Performed by: INTERNAL MEDICINE

## 2024-11-13 PROCEDURE — 80053 COMPREHEN METABOLIC PANEL: CPT

## 2024-11-13 PROCEDURE — 3078F DIAST BP <80 MM HG: CPT | Performed by: INTERNAL MEDICINE

## 2024-11-13 PROCEDURE — 85025 COMPLETE CBC W/AUTO DIFF WBC: CPT

## 2024-11-13 PROCEDURE — 99215 OFFICE O/P EST HI 40 MIN: CPT | Performed by: INTERNAL MEDICINE

## 2024-11-13 PROCEDURE — 3074F SYST BP LT 130 MM HG: CPT | Performed by: INTERNAL MEDICINE

## 2024-11-13 PROCEDURE — 6360000002 HC RX W HCPCS: Performed by: INTERNAL MEDICINE

## 2024-11-13 PROCEDURE — 96413 CHEMO IV INFUSION 1 HR: CPT

## 2024-11-13 PROCEDURE — 2580000003 HC RX 258: Performed by: INTERNAL MEDICINE

## 2024-11-13 PROCEDURE — 96411 CHEMO IV PUSH ADDL DRUG: CPT

## 2024-11-13 RX ORDER — SODIUM CHLORIDE 9 MG/ML
5-250 INJECTION, SOLUTION INTRAVENOUS PRN
OUTPATIENT
Start: 2024-11-27

## 2024-11-13 RX ORDER — DIPHENHYDRAMINE HYDROCHLORIDE 50 MG/ML
50 INJECTION INTRAMUSCULAR; INTRAVENOUS
OUTPATIENT
Start: 2024-11-27

## 2024-11-13 RX ORDER — ALBUTEROL SULFATE 90 UG/1
4 INHALANT RESPIRATORY (INHALATION) PRN
OUTPATIENT
Start: 2024-11-27

## 2024-11-13 RX ORDER — HYDROCORTISONE SODIUM SUCCINATE 100 MG/2ML
100 INJECTION INTRAMUSCULAR; INTRAVENOUS
OUTPATIENT
Start: 2024-11-27

## 2024-11-13 RX ORDER — ONDANSETRON 2 MG/ML
8 INJECTION INTRAMUSCULAR; INTRAVENOUS
OUTPATIENT
Start: 2024-11-27

## 2024-11-13 RX ORDER — MEPERIDINE HYDROCHLORIDE 25 MG/ML
12.5 INJECTION INTRAMUSCULAR; INTRAVENOUS; SUBCUTANEOUS PRN
OUTPATIENT
Start: 2024-11-27

## 2024-11-13 RX ORDER — SODIUM CHLORIDE 9 MG/ML
INJECTION, SOLUTION INTRAVENOUS CONTINUOUS
OUTPATIENT
Start: 2024-11-27

## 2024-11-13 RX ORDER — FAMOTIDINE 10 MG/ML
20 INJECTION, SOLUTION INTRAVENOUS
OUTPATIENT
Start: 2024-11-27

## 2024-11-13 RX ORDER — ACETAMINOPHEN 325 MG/1
650 TABLET ORAL
OUTPATIENT
Start: 2024-11-27

## 2024-11-13 RX ORDER — PROCHLORPERAZINE EDISYLATE 5 MG/ML
10 INJECTION INTRAMUSCULAR; INTRAVENOUS EVERY 6 HOURS PRN
OUTPATIENT
Start: 2024-11-27

## 2024-11-13 RX ORDER — SODIUM CHLORIDE 9 MG/ML
5-250 INJECTION, SOLUTION INTRAVENOUS PRN
Status: DISCONTINUED | OUTPATIENT
Start: 2024-11-13 | End: 2024-11-14 | Stop reason: HOSPADM

## 2024-11-13 RX ORDER — HEPARIN 100 UNIT/ML
500 SYRINGE INTRAVENOUS PRN
OUTPATIENT
Start: 2024-11-27

## 2024-11-13 RX ORDER — SODIUM CHLORIDE 0.9 % (FLUSH) 0.9 %
5-40 SYRINGE (ML) INJECTION PRN
OUTPATIENT
Start: 2024-11-27

## 2024-11-13 RX ORDER — EPINEPHRINE 1 MG/ML
0.3 INJECTION, SOLUTION INTRAMUSCULAR; SUBCUTANEOUS PRN
OUTPATIENT
Start: 2024-11-27

## 2024-11-13 RX ADMIN — VINORELBINE 39 MG: 10 INJECTION, SOLUTION INTRAVENOUS at 13:00

## 2024-11-13 RX ADMIN — SODIUM CHLORIDE 336 MG: 9 INJECTION, SOLUTION INTRAVENOUS at 13:22

## 2024-11-13 ASSESSMENT — PATIENT HEALTH QUESTIONNAIRE - PHQ9
1. LITTLE INTEREST OR PLEASURE IN DOING THINGS: NOT AT ALL
SUM OF ALL RESPONSES TO PHQ9 QUESTIONS 1 & 2: 0
2. FEELING DOWN, DEPRESSED OR HOPELESS: NOT AT ALL
SUM OF ALL RESPONSES TO PHQ QUESTIONS 1-9: 0

## 2024-11-13 ASSESSMENT — PAIN SCALES - GENERAL
PAINLEVEL_OUTOF10: 0
PAINLEVEL_OUTOF10: 0

## 2024-11-13 NOTE — PROGRESS NOTES
Cancer [place] at [facility]  [address], [address] [facility] [address]  W: [phone]  F: [phone]      Reason for Visit:   Debra Moser is a 64 y.o. female who has transferred to me from [place].    Treatment History:   2016:  Original right stage 3 breast cancer, s/p mastectomy with 5 cm IDC, 3/19 LN, ER negative, CA negative, HER 2 +, treated with chemotherapy (unknown) and XRT (in [facility])  9/2018:  recurrent breast cancer to her neck, tx with docetaxel q 3 weeks x 9, completed 4/12/2019 (PD) ([facility])  6/25/19 L ax LN FNA:  poorly differentiated carcinoma, c/w breast cancer, ER negative, CA negative, HER 2 positive at IHC 3+  Tempus testing:  HER 2 overexpression  7/26/19 Trastuzumab/pertuzumab/paclitaxel until 7/2021 after 32 cycles (PD) ([place], [place])  XRT to right neck 1/2021  T-DM1 8/10/21- T-Dxd  T-Dxd 7/2022- 9/26/23   Hold 9/26/23 due to gr 1 ILD - discontinued due to continued concern for chronic ILD on CT  Right shoulder dislocation and infection 11/17/22  Zometa 9/5/2023 - current  Bronch, BAL, malignant cells present, compatible with carcinoma, not able to do receptors  Trastuzumab 11/21/23 - 1/23/24 (PD)  Tucatinib 11/13/23 - 2/13/24 (PD)  Capecitabine 11/23/23 - 2/13/24 (PD)  T-Dxd 2/20/24- 6/4/24 (PD)  Margetuximab + eribulin 6/18/24- 9/6/24 (PD)  Right thoracentesis 6/14/24 1500 mL removed - + for metastatic carcinoma, ER/CA -, HER2 + by IHC 3+  9/17/24 right neck mass, bx:  metastatic breast cancer, ER negative, CA negative, HER 2 + at IHC 3+, ki67 40%; left neck biopsy:  metastatic breast cancer, ER negative, CA negative, HER 2 positive at IHC 3+  Vinorelbine and trastuzumab 10/2/24-    History of Present Illness:   Recently moved to [place] to live with daughter.    Interval Hx: Patient presents today for follow up and treatment.  Gr 2 loss of appetite, gr 1 fatigue    Entire visit was done with Michelle from [facility].    Was admitted

## 2024-11-13 NOTE — PROGRESS NOTES
tablet by mouth daily (with breakfast) 30 tablet 5    apixaban (ELIQUIS) 5 MG TABS tablet Take 1 tablet by mouth 2 times daily 60 tablet 5     No current facility-administered medications for this encounter.     Facility-Administered Medications Ordered in Other Encounters   Medication Dose Route Frequency Provider Last Rate Last Admin    0.9 % sodium chloride infusion  5-250 mL/hr IntraVENous PRN Doyle Underwood MD            PHYSICAL EXAM:   /69  Pulse 88  Temp 99 °F (37.2 °C) (Temporal)  Resp 18  Ht 1.575 m (5' 2\")  Wt 58.1 kg (128 lb)  SpO2 94%  BMI 23.41 kg/m²   Ms. Lobito Moser has non-elevated blood pressure today. No recommendations are needed given BP is within the normal range.   Performance status: ECOG 1, No physically strenuous activity, but ambulatory and able to carry out light or sedentary work (eg office work, light house work)  General:  No evidence of impaired alertness, inadequate appearance, premature or advanced chronologic age, uncooperativeness, developmental delays, altered mood and affect and disorientation.  Cardiovascular: No evidence of arterial pulse(s) abnormalities, abnormal heart rate, heart arrhythmia and abnormal heart sounds.  Respiratory:  No evidence of abnormal breath sounds, chest abnormalities on palpation and chest abnormalities on percussion.  Breast: Several erythematous raised masses spreading on to the left breast  Skin: Multiple large masses over her left neck spreading up to behind left ear, nontender to palpation.  Several lesions appear crusted over.  Right neck with multiple small erythematous lesions spreading from supraclavicular/upper chest wall up to behind right ear.   Musculoskeletal: No evidence of bone abnormalities, joint abnormalities, compromised muscle tone and restricted range of motion.  Neurologic:No evidence of impaired cranial nerve(s), uncoordinated gait, motor impairment, a sensory deficit and impaired reflexes.      IMAGING:

## 2024-11-13 NOTE — PROGRESS NOTES
Debra Moser is a 64 y.o. female is here today for a breast cancer follow up.    1. Have you been to the ER, urgent care clinic since your last visit?  Hospitalized since your last visit?No    2. Have you seen or consulted any other health care providers outside of the [facility] since your last visit?  Include any pap smears or colon screening. No       11/13/2024  10:45 AM   Vitals    SYSTOLIC 118    DIASTOLIC 69    Site    Position    Pulse 88    Temp 99 °F (37.2 °C)    Respirations 18    SpO2 94 %    Weight - Scale 128 lb    Height 5' 2\"    Body Mass Index 23.41 kg/m2    Pain Score    Pain Loc    Pain Level 0

## 2024-11-13 NOTE — PROGRESS NOTES
[facility] Progress Note    Date: 2024    Name: Debra Moser    MRN: [id number]         : 1960    Ms. Lobito Moser Arrived ambulatory and in no distress for C3D1 of Vinorelbine +Trazimera Regimen.  Assessment was completed, no acute issues at this time, no new complaints voiced. Chest wall port accessed without difficulty, labs drawn & sent for processing.     Patient proceed to appointment with Dr. Underwood.    Ms. Lobito Moser's vitals were reviewed.  Vitals:    24 1045   BP: 118/69   Pulse: 88   Resp: 18   Temp: 99 °F (37.2 °C)   SpO2: 94%       Lab results were obtained and reviewed.  Recent Results (from the past 12 hour(s))   CBC With Auto Differential    Collection Time: 24 10:59 AM   Result Value Ref Range    WBC 5.5 3.6 - 11.0 K/uL    RBC 3.70 (L) 3.80 - 5.20 M/uL    Hemoglobin 9.6 (L) 11.5 - 16.0 g/dL    Hematocrit 32.4 (L) 35.0 - 47.0 %    MCV 87.6 80.0 - 99.0 FL    MCH 25.9 (L) 26.0 - 34.0 PG    MCHC 29.6 (L) 30.0 - 36.5 g/dL    RDW 20.5 (H) 11.5 - 14.5 %    Platelets 339 150 - 400 K/uL    MPV 8.0 (L) 8.9 - 12.9 FL    Nucleated RBCs 0.0 0  WBC    nRBC 0.00 0.00 - 0.01 K/uL    Neutrophils % 76 (H) 32 - 75 %    Lymphocytes % 15 12 - 49 %    Monocytes % 8 5 - 13 %    Eosinophils % 0 0 - 7 %    Basophils % 1 0 - 1 %    Immature Granulocytes % 0 0.0 - 0.5 %    Neutrophils Absolute 4.2 1.8 - 8.0 K/UL    Lymphocytes Absolute 0.8 0.8 - 3.5 K/UL    Monocytes Absolute 0.4 0.0 - 1.0 K/UL    Eosinophils Absolute 0.0 0.0 - 0.4 K/UL    Basophils Absolute 0.1 0.0 - 0.1 K/UL    Immature Granulocytes Absolute 0.0 0.00 - 0.04 K/UL    Differential Type MANUAL      RBC Comment ANISOCYTOSIS  2+        RBC Comment HYPOCHROMIA  PRESENT       Comprehensive metabolic panel    Collection Time: 24 10:59 AM   Result Value Ref Range    Sodium 141 136 - 145 mmol/L    Potassium 3.7 3.5 - 5.1 mmol/L    Chloride 106 97 - 108 mmol/L    CO2 31 21 - 32 mmol/L    Anion Gap 4 2 - 12 mmol/L         Sadaf Islas RN  November 13, 2024

## 2024-11-15 ENCOUNTER — HOSPITAL ENCOUNTER (OUTPATIENT)
Facility: HOSPITAL | Age: 64
Discharge: HOME | End: 2024-11-18
Attending: RADIOLOGY

## 2024-11-19 ENCOUNTER — TELEPHONE (OUTPATIENT)
Age: 64
End: 2024-11-19

## 2024-11-19 ENCOUNTER — APPOINTMENT (OUTPATIENT)
Facility: HOSPITAL | Age: 64
End: 2024-11-19
Payer: MEDICAID

## 2024-11-19 ENCOUNTER — HOSPITAL ENCOUNTER (OUTPATIENT)
Facility: HOSPITAL | Age: 64
Discharge: HOME | End: 2024-11-22
Attending: RADIOLOGY

## 2024-11-19 LAB
RAD ONC ARIA COURSE FIRST TREATMENT DATE: NORMAL
RAD ONC ARIA COURSE ID: NORMAL
RAD ONC ARIA COURSE LAST TREATMENT DATE: NORMAL
RAD ONC ARIA COURSE SESSION NUMBER: 1
RAD ONC ARIA COURSE START DATE: NORMAL
RAD ONC ARIA COURSE TREATMENT ELAPSED DAYS: 0
RAD ONC ARIA PLAN FRACTIONS TREATED TO DATE: 1
RAD ONC ARIA PLAN ID: NORMAL
RAD ONC ARIA PLAN PRESCRIBED DOSE PER FRACTION: 4 GY
RAD ONC ARIA PLAN PRIMARY REFERENCE POINT: NORMAL
RAD ONC ARIA PLAN TOTAL FRACTIONS PRESCRIBED: 5
RAD ONC ARIA PLAN TOTAL PRESCRIBED DOSE: 2000 CGY
RAD ONC ARIA REFERENCE POINT DOSAGE GIVEN TO DATE: 4 GY
RAD ONC ARIA REFERENCE POINT DOSAGE GIVEN TO DATE: 4.2 GY
RAD ONC ARIA REFERENCE POINT ID: NORMAL
RAD ONC ARIA REFERENCE POINT ID: NORMAL
RAD ONC ARIA REFERENCE POINT SESSION DOSAGE GIVEN: 4 GY
RAD ONC ARIA REFERENCE POINT SESSION DOSAGE GIVEN: 4.2 GY

## 2024-11-19 NOTE — TELEPHONE ENCOUNTER
Patients , Destiny MONSALVE, called and stated that she received a letter for the patient from Dr. Desir office in Neurology stating that the patient missed her appt. She stated that when she called back his office said they has no record of the patient having an appt with them. Requested a call back to discuss what they can do from here.       # [phone]

## 2024-11-20 ENCOUNTER — OFFICE VISIT (OUTPATIENT)
Age: 64
End: 2024-11-20
Payer: MEDICAID

## 2024-11-20 ENCOUNTER — HOSPITAL ENCOUNTER (OUTPATIENT)
Facility: HOSPITAL | Age: 64
Setting detail: INFUSION SERIES
Discharge: HOME | End: 2024-11-20
Payer: MEDICAID

## 2024-11-20 ENCOUNTER — HOSPITAL ENCOUNTER (OUTPATIENT)
Facility: HOSPITAL | Age: 64
Discharge: HOME | End: 2024-11-23
Attending: RADIOLOGY

## 2024-11-20 VITALS
OXYGEN SATURATION: 93 % | TEMPERATURE: 98 F | WEIGHT: 127.2 LBS | BODY MASS INDEX: 23.41 KG/M2 | HEIGHT: 62 IN | HEART RATE: 89 BPM | RESPIRATION RATE: 18 BRPM | SYSTOLIC BLOOD PRESSURE: 117 MMHG | DIASTOLIC BLOOD PRESSURE: 76 MMHG

## 2024-11-20 VITALS
BODY MASS INDEX: 23.37 KG/M2 | TEMPERATURE: 98 F | HEIGHT: 62 IN | RESPIRATION RATE: 18 BRPM | WEIGHT: 127 LBS | OXYGEN SATURATION: 93 % | HEART RATE: 89 BPM | SYSTOLIC BLOOD PRESSURE: 117 MMHG | DIASTOLIC BLOOD PRESSURE: 76 MMHG

## 2024-11-20 DIAGNOSIS — C50.919 METASTASIS FROM BREAST CANCER (HCC): ICD-10-CM

## 2024-11-20 DIAGNOSIS — C79.51 CARCINOMA OF BREAST METASTATIC TO BONE, UNSPECIFIED LATERALITY (HCC): ICD-10-CM

## 2024-11-20 DIAGNOSIS — C78.01 MALIGNANT NEOPLASM METASTATIC TO BOTH LUNGS (HCC): Primary | ICD-10-CM

## 2024-11-20 DIAGNOSIS — C50.919 CARCINOMA OF BREAST METASTATIC TO BONE, UNSPECIFIED LATERALITY (HCC): ICD-10-CM

## 2024-11-20 DIAGNOSIS — C50.919 MALIGNANT NEOPLASM OF BREAST IN FEMALE, ESTROGEN RECEPTOR NEGATIVE, UNSPECIFIED LATERALITY, UNSPECIFIED SITE OF BREAST (HCC): ICD-10-CM

## 2024-11-20 DIAGNOSIS — Z17.1 MALIGNANT NEOPLASM OF BREAST IN FEMALE, ESTROGEN RECEPTOR NEGATIVE, UNSPECIFIED LATERALITY, UNSPECIFIED SITE OF BREAST (HCC): ICD-10-CM

## 2024-11-20 DIAGNOSIS — Z51.11 ENCOUNTER FOR ANTINEOPLASTIC CHEMOTHERAPY: ICD-10-CM

## 2024-11-20 DIAGNOSIS — C79.9 METASTASIS FROM BREAST CANCER (HCC): ICD-10-CM

## 2024-11-20 DIAGNOSIS — C78.02 MALIGNANT NEOPLASM METASTATIC TO BOTH LUNGS (HCC): Primary | ICD-10-CM

## 2024-11-20 DIAGNOSIS — Z51.11 ENCOUNTER FOR ANTINEOPLASTIC CHEMOTHERAPY: Primary | ICD-10-CM

## 2024-11-20 LAB
BASOPHILS # BLD: 0.1 K/UL (ref 0–0.1)
BASOPHILS NFR BLD: 1 % (ref 0–1)
DIFFERENTIAL METHOD BLD: ABNORMAL
EOSINOPHIL # BLD: 0 K/UL (ref 0–0.4)
EOSINOPHIL NFR BLD: 0 % (ref 0–7)
ERYTHROCYTE [DISTWIDTH] IN BLOOD BY AUTOMATED COUNT: 20.2 % (ref 11.5–14.5)
HCT VFR BLD AUTO: 31.9 % (ref 35–47)
HGB BLD-MCNC: 9.4 G/DL (ref 11.5–16)
IMM GRANULOCYTES # BLD AUTO: 0.1 K/UL (ref 0–0.04)
IMM GRANULOCYTES NFR BLD AUTO: 1 % (ref 0–0.5)
LYMPHOCYTES # BLD: 0.9 K/UL (ref 0.8–3.5)
LYMPHOCYTES NFR BLD: 15 % (ref 12–49)
MCH RBC QN AUTO: 25.6 PG (ref 26–34)
MCHC RBC AUTO-ENTMCNC: 29.5 G/DL (ref 30–36.5)
MCV RBC AUTO: 86.9 FL (ref 80–99)
MONOCYTES # BLD: 0.1 K/UL (ref 0–1)
MONOCYTES NFR BLD: 2 % (ref 5–13)
NEUTS SEG # BLD: 5 K/UL (ref 1.8–8)
NEUTS SEG NFR BLD: 81 % (ref 32–75)
NRBC # BLD: 0 K/UL (ref 0–0.01)
NRBC BLD-RTO: 0 PER 100 WBC
PLATELET # BLD AUTO: 341 K/UL (ref 150–400)
PMV BLD AUTO: 9 FL (ref 8.9–12.9)
RAD ONC ARIA COURSE FIRST TREATMENT DATE: NORMAL
RAD ONC ARIA COURSE ID: NORMAL
RAD ONC ARIA COURSE LAST TREATMENT DATE: NORMAL
RAD ONC ARIA COURSE SESSION NUMBER: 2
RAD ONC ARIA COURSE START DATE: NORMAL
RAD ONC ARIA COURSE TREATMENT ELAPSED DAYS: 1
RAD ONC ARIA PLAN FRACTIONS TREATED TO DATE: 2
RAD ONC ARIA PLAN ID: NORMAL
RAD ONC ARIA PLAN PRESCRIBED DOSE PER FRACTION: 4 GY
RAD ONC ARIA PLAN PRIMARY REFERENCE POINT: NORMAL
RAD ONC ARIA PLAN TOTAL FRACTIONS PRESCRIBED: 5
RAD ONC ARIA PLAN TOTAL PRESCRIBED DOSE: 2000 CGY
RAD ONC ARIA REFERENCE POINT DOSAGE GIVEN TO DATE: 8 GY
RAD ONC ARIA REFERENCE POINT DOSAGE GIVEN TO DATE: 8.41 GY
RAD ONC ARIA REFERENCE POINT ID: NORMAL
RAD ONC ARIA REFERENCE POINT ID: NORMAL
RAD ONC ARIA REFERENCE POINT SESSION DOSAGE GIVEN: 4 GY
RAD ONC ARIA REFERENCE POINT SESSION DOSAGE GIVEN: 4.2 GY
RBC # BLD AUTO: 3.67 M/UL (ref 3.8–5.2)
RBC MORPH BLD: ABNORMAL
RBC MORPH BLD: ABNORMAL
WBC # BLD AUTO: 6.2 K/UL (ref 3.6–11)

## 2024-11-20 PROCEDURE — 99215 OFFICE O/P EST HI 40 MIN: CPT | Performed by: INTERNAL MEDICINE

## 2024-11-20 PROCEDURE — 3074F SYST BP LT 130 MM HG: CPT | Performed by: INTERNAL MEDICINE

## 2024-11-20 PROCEDURE — 36591 DRAW BLOOD OFF VENOUS DEVICE: CPT

## 2024-11-20 PROCEDURE — G2211 COMPLEX E/M VISIT ADD ON: HCPCS | Performed by: INTERNAL MEDICINE

## 2024-11-20 PROCEDURE — 85025 COMPLETE CBC W/AUTO DIFF WBC: CPT

## 2024-11-20 PROCEDURE — 3078F DIAST BP <80 MM HG: CPT | Performed by: INTERNAL MEDICINE

## 2024-11-20 ASSESSMENT — PATIENT HEALTH QUESTIONNAIRE - PHQ9
1. LITTLE INTEREST OR PLEASURE IN DOING THINGS: NOT AT ALL
2. FEELING DOWN, DEPRESSED OR HOPELESS: NOT AT ALL
SUM OF ALL RESPONSES TO PHQ QUESTIONS 1-9: 0
SUM OF ALL RESPONSES TO PHQ9 QUESTIONS 1 & 2: 0

## 2024-11-20 ASSESSMENT — PAIN SCALES - GENERAL: PAINLEVEL_OUTOF10: 0

## 2024-11-20 NOTE — PROGRESS NOTES
Debra Moser is a 64 y.o. female is here today for a breast cancer follow up.    1. Have you been to the ER, urgent care clinic since your last visit?  Hospitalized since your last visit?No    2. Have you seen or consulted any other health care providers outside of the [facility] since your last visit?  Include any pap smears or colon screening. No       11/20/2024  10:45 AM   Vitals    SYSTOLIC 117    DIASTOLIC 76    Site    Position    Pulse 89    Temp 98 °F (36.7 °C)    Respirations 18    SpO2 93 %    Weight - Scale 127 lb 3.2 oz    Height 5' 2.008\"    Body Mass Index 23.26 kg/m2    Pain Score    Pain Loc    Pain Level 0        
Oriented to person/place/time, Able to follow commands    Eyes: EOM normal, Sclera normal, No visible ocular discharge    HENT: Normocephalic, atraumatic    Mouth/Throat: Moist mucous membranes    External Ears normal    Neck: No visualized mass    Pulmonary/Chest: Respiratory effort normal, No visualized signs of difficulty breathing or respiratory distress. Right lower lobe absent, right middle lobe with rhonci.    Musculoskeletal: Normal gait with no signs of ataxia, Normal range of motion of neck    Neurological: No facial asymmetry (Cranial nerve 7 motor function), No gaze palsy    Skin: see photo    Abd:  distention improved    Psychiatric: Normal affect, No hallucinations         8/6/24 8/27/24 Largest nodule 2.5 cm    9/17/24              10/30/24          11/13/24          Results:     Lab Results   Component Value Date/Time    WBC 6.2 11/20/2024 11:07 AM    HGB 9.4 11/20/2024 11:07 AM    HCT 31.9 11/20/2024 11:07 AM     11/20/2024 11:07 AM    MCV 86.9 11/20/2024 11:07 AM     Lab Results   Component Value Date/Time     11/13/2024 10:59 AM    K 3.7 11/13/2024 10:59 AM     11/13/2024 10:59 AM    CO2 31 11/13/2024 10:59 AM    BUN 13 11/13/2024 10:59 AM     Lab Results   Component Value Date/Time    ALT 9 11/13/2024 10:59 AM    GLOB 5.0 11/13/2024 10:59 AM       TTE 8/10/23  SUMMARY: The estimated left ventricular ejection fraction is 60%.   GLS=-21.4%.   The interatrial septum appears aneurysmal.   Minimal AV leaflet thickening with normal mobility.   No aortic stenosis is detected.   No changes compared to study performed on 5/23/23.     5/24/23 PET  FINDINGS:     HEAD AND NECK:   *  Normal FDG uptake in the soft tissues of head and neck.  Physiologic intense metabolic activity is in the brain, salivary glands, adenoids,  oral cavity, and vocal cords and therefore underlying lesion detection sensitivity in these areas is low.     CHEST   LUNGS:   *   Hypermetabolic right upper lobe 
decreased weight-shifting ability/increased time in double stance/decreased stride length/decreased jay/decreased step length

## 2024-11-20 NOTE — PROGRESS NOTES
[facility] Progress Note    Date: 2024    Name: Debra Moser    MRN: [id number]         : 1960    Ms. Lobito Moser Arrived in wheelchair and is in no distress for C3D8 of Navelbine (HELD) Regimen.  Assessment was completed, no acute issues at this time, no new complaints voiced. Right Chest wall port accessed without difficulty, labs drawn & sent for processing.     Patient proceed to appointment with Dr. Underwood.    Ms. Lobito Moser's vitals were reviewed.  Vitals:    24 1045   BP: 117/76   Pulse: 89   Resp: 18   Temp: 98 °F (36.7 °C)   SpO2: 93%     Per MD patient treatment will be held today.    Lab results were obtained and reviewed.  Recent Results (from the past 12 hour(s))   CBC With Auto Differential    Collection Time: 24 11:07 AM   Result Value Ref Range    WBC 6.2 3.6 - 11.0 K/uL    RBC 3.67 (L) 3.80 - 5.20 M/uL    Hemoglobin 9.4 (L) 11.5 - 16.0 g/dL    Hematocrit 31.9 (L) 35.0 - 47.0 %    MCV 86.9 80.0 - 99.0 FL    MCH 25.6 (L) 26.0 - 34.0 PG    MCHC 29.5 (L) 30.0 - 36.5 g/dL    RDW 20.2 (H) 11.5 - 14.5 %    Platelets 341 150 - 400 K/uL    MPV 9.0 8.9 - 12.9 FL    Nucleated RBCs 0.0 0  WBC    nRBC 0.00 0.00 - 0.01 K/uL    Neutrophils % 81 (H) 32 - 75 %    Lymphocytes % 15 12 - 49 %    Monocytes % 2 (L) 5 - 13 %    Eosinophils % 0 0 - 7 %    Basophils % 1 0 - 1 %    Immature Granulocytes % 1 (H) 0.0 - 0.5 %    Neutrophils Absolute 5.0 1.8 - 8.0 K/UL    Lymphocytes Absolute 0.9 0.8 - 3.5 K/UL    Monocytes Absolute 0.1 0.0 - 1.0 K/UL    Eosinophils Absolute 0.0 0.0 - 0.4 K/UL    Basophils Absolute 0.1 0.0 - 0.1 K/UL    Immature Granulocytes Absolute 0.1 (H) 0.00 - 0.04 K/UL    Differential Type SMEAR SCANNED      RBC Comment ANISOCYTOSIS  PRESENT        RBC Comment MICROCYTOSIS  PRESENT                  Ms. Lobito Moser  [facility] in [facility] condition.   Port de-accessed, flushed per protocol. Patient is aware of future  appointments.    Future Appointments   Date Time Provider Department Center   11/21/2024  5:30 PM [id number]   11/22/2024  5:15 PM Fernando Mercedes MD [facility]   11/22/2024  5:45 PM [id number]   11/24/2024 12:00 PM [id number]   12/4/2024 10:30 AM SS CHEMO CHAIR 13 [facility]   12/11/2024 10:30 AM SS CHEMO CHAIR 13 [facility]   12/24/2024 10:30 AM SS CHEMO CHAIR 13 [facility]        Janine Hill RN  November 20, 2024

## 2024-11-21 ENCOUNTER — HOSPITAL ENCOUNTER (OUTPATIENT)
Facility: HOSPITAL | Age: 64
Discharge: HOME | End: 2024-11-24
Attending: RADIOLOGY

## 2024-11-21 LAB
RAD ONC ARIA COURSE FIRST TREATMENT DATE: NORMAL
RAD ONC ARIA COURSE ID: NORMAL
RAD ONC ARIA COURSE LAST TREATMENT DATE: NORMAL
RAD ONC ARIA COURSE SESSION NUMBER: 3
RAD ONC ARIA COURSE START DATE: NORMAL
RAD ONC ARIA COURSE TREATMENT ELAPSED DAYS: 2
RAD ONC ARIA PLAN FRACTIONS TREATED TO DATE: 3
RAD ONC ARIA PLAN ID: NORMAL
RAD ONC ARIA PLAN PRESCRIBED DOSE PER FRACTION: 4 GY
RAD ONC ARIA PLAN PRIMARY REFERENCE POINT: NORMAL
RAD ONC ARIA PLAN TOTAL FRACTIONS PRESCRIBED: 5
RAD ONC ARIA PLAN TOTAL PRESCRIBED DOSE: 2000 CGY
RAD ONC ARIA REFERENCE POINT DOSAGE GIVEN TO DATE: 12 GY
RAD ONC ARIA REFERENCE POINT DOSAGE GIVEN TO DATE: 12.61 GY
RAD ONC ARIA REFERENCE POINT ID: NORMAL
RAD ONC ARIA REFERENCE POINT ID: NORMAL
RAD ONC ARIA REFERENCE POINT SESSION DOSAGE GIVEN: 4 GY
RAD ONC ARIA REFERENCE POINT SESSION DOSAGE GIVEN: 4.2 GY

## 2024-11-22 ENCOUNTER — HOSPITAL ENCOUNTER (OUTPATIENT)
Facility: HOSPITAL | Age: 64
End: 2024-11-22
Attending: RADIOLOGY

## 2024-11-22 ENCOUNTER — HOSPITAL ENCOUNTER (OUTPATIENT)
Facility: HOSPITAL | Age: 64
Discharge: HOME | End: 2024-11-25
Attending: RADIOLOGY

## 2024-11-22 DIAGNOSIS — C79.2 SECONDARY MALIGNANT NEOPLASM OF SKIN (HCC): ICD-10-CM

## 2024-11-22 DIAGNOSIS — C50.919 MALIGNANT NEOPLASM OF BREAST IN FEMALE, ESTROGEN RECEPTOR NEGATIVE, UNSPECIFIED LATERALITY, UNSPECIFIED SITE OF BREAST (HCC): Primary | ICD-10-CM

## 2024-11-22 DIAGNOSIS — Z17.1 MALIGNANT NEOPLASM OF BREAST IN FEMALE, ESTROGEN RECEPTOR NEGATIVE, UNSPECIFIED LATERALITY, UNSPECIFIED SITE OF BREAST (HCC): Primary | ICD-10-CM

## 2024-11-22 LAB
RAD ONC ARIA COURSE FIRST TREATMENT DATE: NORMAL
RAD ONC ARIA COURSE ID: NORMAL
RAD ONC ARIA COURSE LAST TREATMENT DATE: NORMAL
RAD ONC ARIA COURSE SESSION NUMBER: 4
RAD ONC ARIA COURSE START DATE: NORMAL
RAD ONC ARIA COURSE TREATMENT ELAPSED DAYS: 3
RAD ONC ARIA PLAN FRACTIONS TREATED TO DATE: 4
RAD ONC ARIA PLAN ID: NORMAL
RAD ONC ARIA PLAN PRESCRIBED DOSE PER FRACTION: 4 GY
RAD ONC ARIA PLAN PRIMARY REFERENCE POINT: NORMAL
RAD ONC ARIA PLAN TOTAL FRACTIONS PRESCRIBED: 5
RAD ONC ARIA PLAN TOTAL PRESCRIBED DOSE: 2000 CGY
RAD ONC ARIA REFERENCE POINT DOSAGE GIVEN TO DATE: 16 GY
RAD ONC ARIA REFERENCE POINT DOSAGE GIVEN TO DATE: 16.81 GY
RAD ONC ARIA REFERENCE POINT ID: NORMAL
RAD ONC ARIA REFERENCE POINT ID: NORMAL
RAD ONC ARIA REFERENCE POINT SESSION DOSAGE GIVEN: 4 GY
RAD ONC ARIA REFERENCE POINT SESSION DOSAGE GIVEN: 4.2 GY

## 2024-11-22 ASSESSMENT — PAIN SCALES - GENERAL: PAINLEVEL_OUTOF10: 0

## 2024-11-22 NOTE — PROGRESS NOTES
Cancer [place] at [facility]  [facility]      RADIATION ONCOLOGY WEEKLY PROGRESS NOTE    Encounter Date: 11/22/24   Patient Name: Debra Moser  YOB: 1960  Medical Record Number: [id number]    DIAGNOSIS:       ICD-10-CM    1. Malignant neoplasm of breast in female, estrogen receptor negative, unspecified laterality, unspecified site of breast (HCC)  C50.919     Z17.1       2. Secondary malignant neoplasm of skin (HCC)  C79.2         STAGING:   Stage IV  AJCC Staging has been reviewed    ASSESSMENT:   63 yo F with metastatic breast cancer       TREATMENT DETAILS:     Treatment Site Dose/Fx (cGy) #Fx Current Dose (cGy) Total Planned Dose (cGy) Start Date End Date   Bilateral neck/upper chest wall skin 400 4/5 1600 2000 11/19/24 On going     Concurrent systemic therapy (held during radiation)    SUBJECTIVE   Ms. Lobito Moser is a 64 y.o. female seen today for her weekly on-treatment evaluation    11/20/24:  First OTV, at fraction 4, doing well.  Still having some bleeding from the left neck tumors. No pain.  Discussed it may take a few weeks after radiation completes to see results. She will complete treatment Sunday and continue with systemic therapy per med onc.  Went over RT schedule and answered questions.      OBJECTIVE/PHYSICAL EXAM:   VITAL SIGNS: There were no vitals taken for this visit.  Wt Readings from Last 5 Encounters:   11/20/24 57.7 kg (127 lb 3.2 oz)   11/20/24 57.6 kg (127 lb)   11/13/24 58.1 kg (128 lb)   11/13/24 58.1 kg (128 lb)   10/30/24 57.9 kg (127 lb 9.6 oz)   Pain Level: 0  Pain Score:   0 - No pain   Performance status:  ECOG 1, No physically strenuous activity, but ambulatory and able to carry out light or sedentary work (eg office work, light house work)  General: Alert and conversant, in NAD  Skin: stable bilateral neck and upper chest wall masses; no erythema above baseline tumors    LABS:  Personally

## 2024-11-24 ENCOUNTER — HOSPITAL ENCOUNTER (OUTPATIENT)
Facility: HOSPITAL | Age: 64
Discharge: HOME | End: 2024-11-27
Attending: RADIOLOGY

## 2024-11-24 LAB
RAD ONC ARIA COURSE FIRST TREATMENT DATE: NORMAL
RAD ONC ARIA COURSE ID: NORMAL
RAD ONC ARIA COURSE LAST TREATMENT DATE: NORMAL
RAD ONC ARIA COURSE SESSION NUMBER: 5
RAD ONC ARIA COURSE START DATE: NORMAL
RAD ONC ARIA COURSE TREATMENT ELAPSED DAYS: 5
RAD ONC ARIA PLAN FRACTIONS TREATED TO DATE: 5
RAD ONC ARIA PLAN ID: NORMAL
RAD ONC ARIA PLAN PRESCRIBED DOSE PER FRACTION: 4 GY
RAD ONC ARIA PLAN PRIMARY REFERENCE POINT: NORMAL
RAD ONC ARIA PLAN TOTAL FRACTIONS PRESCRIBED: 5
RAD ONC ARIA PLAN TOTAL PRESCRIBED DOSE: 2000 CGY
RAD ONC ARIA REFERENCE POINT DOSAGE GIVEN TO DATE: 20 GY
RAD ONC ARIA REFERENCE POINT DOSAGE GIVEN TO DATE: 21.02 GY
RAD ONC ARIA REFERENCE POINT ID: NORMAL
RAD ONC ARIA REFERENCE POINT ID: NORMAL
RAD ONC ARIA REFERENCE POINT SESSION DOSAGE GIVEN: 4 GY
RAD ONC ARIA REFERENCE POINT SESSION DOSAGE GIVEN: 4.2 GY

## 2024-11-25 DIAGNOSIS — R90.89 ABNORMAL BRAIN MRI: Primary | ICD-10-CM

## 2024-11-25 DIAGNOSIS — C79.51 CARCINOMA OF BREAST METASTATIC TO BONE, UNSPECIFIED LATERALITY (HCC): ICD-10-CM

## 2024-11-25 DIAGNOSIS — C50.919 CARCINOMA OF BREAST METASTATIC TO BONE, UNSPECIFIED LATERALITY (HCC): ICD-10-CM

## 2024-11-26 ENCOUNTER — APPOINTMENT (OUTPATIENT)
Facility: HOSPITAL | Age: 64
End: 2024-11-26
Payer: MEDICAID

## 2024-11-27 ENCOUNTER — OFFICE VISIT (OUTPATIENT)
Age: 64
End: 2024-11-27
Payer: MEDICAID

## 2024-11-27 ENCOUNTER — HOSPITAL ENCOUNTER (OUTPATIENT)
Facility: HOSPITAL | Age: 64
Setting detail: INFUSION SERIES
Discharge: HOME | End: 2024-11-27
Payer: MEDICAID

## 2024-11-27 VITALS
HEIGHT: 62 IN | RESPIRATION RATE: 16 BRPM | TEMPERATURE: 98.2 F | SYSTOLIC BLOOD PRESSURE: 115 MMHG | BODY MASS INDEX: 23.55 KG/M2 | DIASTOLIC BLOOD PRESSURE: 61 MMHG | OXYGEN SATURATION: 91 % | WEIGHT: 128 LBS | HEART RATE: 92 BPM

## 2024-11-27 VITALS
DIASTOLIC BLOOD PRESSURE: 68 MMHG | WEIGHT: 128 LBS | RESPIRATION RATE: 16 BRPM | HEART RATE: 92 BPM | HEIGHT: 62 IN | BODY MASS INDEX: 23.55 KG/M2 | OXYGEN SATURATION: 91 % | SYSTOLIC BLOOD PRESSURE: 122 MMHG | TEMPERATURE: 98.2 F

## 2024-11-27 DIAGNOSIS — C78.01 MALIGNANT NEOPLASM METASTATIC TO BOTH LUNGS (HCC): Primary | ICD-10-CM

## 2024-11-27 DIAGNOSIS — C78.02 MALIGNANT NEOPLASM METASTATIC TO BOTH LUNGS (HCC): Primary | ICD-10-CM

## 2024-11-27 DIAGNOSIS — Z51.11 ENCOUNTER FOR ANTINEOPLASTIC CHEMOTHERAPY: ICD-10-CM

## 2024-11-27 DIAGNOSIS — C50.919 CARCINOMA OF BREAST METASTATIC TO BONE, UNSPECIFIED LATERALITY (HCC): ICD-10-CM

## 2024-11-27 DIAGNOSIS — C79.51 CARCINOMA OF BREAST METASTATIC TO BONE, UNSPECIFIED LATERALITY (HCC): ICD-10-CM

## 2024-11-27 DIAGNOSIS — C79.9 METASTASIS FROM BREAST CANCER (HCC): Primary | ICD-10-CM

## 2024-11-27 DIAGNOSIS — C50.919 MALIGNANT NEOPLASM OF BREAST IN FEMALE, ESTROGEN RECEPTOR NEGATIVE, UNSPECIFIED LATERALITY, UNSPECIFIED SITE OF BREAST (HCC): ICD-10-CM

## 2024-11-27 DIAGNOSIS — Z17.1 MALIGNANT NEOPLASM OF BREAST IN FEMALE, ESTROGEN RECEPTOR NEGATIVE, UNSPECIFIED LATERALITY, UNSPECIFIED SITE OF BREAST (HCC): ICD-10-CM

## 2024-11-27 DIAGNOSIS — C50.919 METASTASIS FROM BREAST CANCER (HCC): Primary | ICD-10-CM

## 2024-11-27 LAB
BASOPHILS # BLD: 0 K/UL (ref 0–0.1)
BASOPHILS NFR BLD: 1 % (ref 0–1)
DIFFERENTIAL METHOD BLD: ABNORMAL
EOSINOPHIL # BLD: 0 K/UL (ref 0–0.4)
EOSINOPHIL NFR BLD: 0 % (ref 0–7)
ERYTHROCYTE [DISTWIDTH] IN BLOOD BY AUTOMATED COUNT: 20.2 % (ref 11.5–14.5)
HCT VFR BLD AUTO: 31.1 % (ref 35–47)
HGB BLD-MCNC: 9.3 G/DL (ref 11.5–16)
IMM GRANULOCYTES # BLD AUTO: 0 K/UL (ref 0–0.04)
IMM GRANULOCYTES NFR BLD AUTO: 1 % (ref 0–0.5)
LYMPHOCYTES # BLD: 0.4 K/UL (ref 0.8–3.5)
LYMPHOCYTES NFR BLD: 8 % (ref 12–49)
MCH RBC QN AUTO: 26 PG (ref 26–34)
MCHC RBC AUTO-ENTMCNC: 29.9 G/DL (ref 30–36.5)
MCV RBC AUTO: 86.9 FL (ref 80–99)
MONOCYTES # BLD: 0.2 K/UL (ref 0–1)
MONOCYTES NFR BLD: 4 % (ref 5–13)
NEUTS SEG # BLD: 4.1 K/UL (ref 1.8–8)
NEUTS SEG NFR BLD: 86 % (ref 32–75)
NRBC # BLD: 0 K/UL (ref 0–0.01)
NRBC BLD-RTO: 0 PER 100 WBC
PLATELET # BLD AUTO: 360 K/UL (ref 150–400)
PMV BLD AUTO: 8.6 FL (ref 8.9–12.9)
RBC # BLD AUTO: 3.58 M/UL (ref 3.8–5.2)
RBC MORPH BLD: ABNORMAL
WBC # BLD AUTO: 4.7 K/UL (ref 3.6–11)

## 2024-11-27 PROCEDURE — 96409 CHEMO IV PUSH SNGL DRUG: CPT

## 2024-11-27 PROCEDURE — 3078F DIAST BP <80 MM HG: CPT | Performed by: INTERNAL MEDICINE

## 2024-11-27 PROCEDURE — 6360000002 HC RX W HCPCS: Performed by: INTERNAL MEDICINE

## 2024-11-27 PROCEDURE — 99215 OFFICE O/P EST HI 40 MIN: CPT | Performed by: INTERNAL MEDICINE

## 2024-11-27 PROCEDURE — G2211 COMPLEX E/M VISIT ADD ON: HCPCS | Performed by: INTERNAL MEDICINE

## 2024-11-27 PROCEDURE — 2580000003 HC RX 258: Performed by: INTERNAL MEDICINE

## 2024-11-27 PROCEDURE — 3074F SYST BP LT 130 MM HG: CPT | Performed by: INTERNAL MEDICINE

## 2024-11-27 PROCEDURE — 85025 COMPLETE CBC W/AUTO DIFF WBC: CPT

## 2024-11-27 RX ORDER — SODIUM CHLORIDE 0.9 % (FLUSH) 0.9 %
5-40 SYRINGE (ML) INJECTION PRN
Status: DISCONTINUED | OUTPATIENT
Start: 2024-11-27 | End: 2024-11-28 | Stop reason: HOSPADM

## 2024-11-27 RX ORDER — SODIUM CHLORIDE 9 MG/ML
5-250 INJECTION, SOLUTION INTRAVENOUS PRN
Status: DISCONTINUED | OUTPATIENT
Start: 2024-11-27 | End: 2024-11-28 | Stop reason: HOSPADM

## 2024-11-27 RX ADMIN — SODIUM CHLORIDE 25 ML/HR: 9 INJECTION, SOLUTION INTRAVENOUS at 13:16

## 2024-11-27 RX ADMIN — SODIUM CHLORIDE, PRESERVATIVE FREE 20 ML: 5 INJECTION INTRAVENOUS at 13:35

## 2024-11-27 RX ADMIN — VINORELBINE 39 MG: 10 INJECTION, SOLUTION INTRAVENOUS at 13:22

## 2024-11-27 ASSESSMENT — PATIENT HEALTH QUESTIONNAIRE - PHQ9
1. LITTLE INTEREST OR PLEASURE IN DOING THINGS: NOT AT ALL
2. FEELING DOWN, DEPRESSED OR HOPELESS: NOT AT ALL
SUM OF ALL RESPONSES TO PHQ QUESTIONS 1-9: 0
SUM OF ALL RESPONSES TO PHQ9 QUESTIONS 1 & 2: 0
SUM OF ALL RESPONSES TO PHQ QUESTIONS 1-9: 0

## 2024-11-27 ASSESSMENT — PAIN SCALES - GENERAL: PAINLEVEL_OUTOF10: 0

## 2024-11-27 NOTE — PROGRESS NOTES
[facility] Progress Note    Date: 2024    Name: Debra Moser    MRN: [id number]         : 1960    Ms. Lobito Moser Arrived ambulatory and in no distress for C3D8 of Vinorelbine Regimen.  Assessment was completed using , no acute issues at this time, no new complaints voiced.  Chest wall port accessed without difficulty, labs drawn & sent for processing.      Patient proceed to appointment with Dr. Underwood.    Ms. Lobito Moser's vitals were reviewed.  Vitals:    24 1345   BP: 122/68   Pulse:    Resp:    Temp:    SpO2:        Lab results were obtained and reviewed.  Recent Results (from the past 12 hour(s))   CBC With Auto Differential    Collection Time: 24 10:18 AM   Result Value Ref Range    WBC 4.7 3.6 - 11.0 K/uL    RBC 3.58 (L) 3.80 - 5.20 M/uL    Hemoglobin 9.3 (L) 11.5 - 16.0 g/dL    Hematocrit 31.1 (L) 35.0 - 47.0 %    MCV 86.9 80.0 - 99.0 FL    MCH 26.0 26.0 - 34.0 PG    MCHC 29.9 (L) 30.0 - 36.5 g/dL    RDW 20.2 (H) 11.5 - 14.5 %    Platelets 360 150 - 400 K/uL    MPV 8.6 (L) 8.9 - 12.9 FL    Nucleated RBCs 0.0 0  WBC    nRBC 0.00 0.00 - 0.01 K/uL    Neutrophils % 86 (H) 32 - 75 %    Lymphocytes % 8 (L) 12 - 49 %    Monocytes % 4 (L) 5 - 13 %    Eosinophils % 0 0 - 7 %    Basophils % 1 0 - 1 %    Immature Granulocytes % 1 (H) 0.0 - 0.5 %    Neutrophils Absolute 4.1 1.8 - 8.0 K/UL    Lymphocytes Absolute 0.4 (L) 0.8 - 3.5 K/UL    Monocytes Absolute 0.2 0.0 - 1.0 K/UL    Eosinophils Absolute 0.0 0.0 - 0.4 K/UL    Basophils Absolute 0.0 0.0 - 0.1 K/UL    Immature Granulocytes Absolute 0.0 0.00 - 0.04 K/UL    Differential Type SMEAR SCANNED      RBC Comment ANISOCYTOSIS  2+           Medications:  Medications Administered         0.9 % sodium chloride infusion Admin Date  2024 Action  New Bag Dose  25 mL/hr Rate  25 mL/hr Route  IntraVENous Documented By  Dada Murray, RN        sodium chloride flush 0.9 % injection 5-40 mL Admin  Date  11/27/2024 Action  Given Dose  20 mL Rate   Route  IntraVENous Documented By  Dada Murray RN        vinORELbine (NAVELBINE) 39 mg in sodium chloride 0.9 % 50 mL chemo IVPB Admin Date  11/27/2024 Action  New Bag Dose  39 mg Rate  353.4 mL/hr Route  IntraVENous Documented By  Dada Murray RN                Two nurses verified prior to administering: Drug name, drug dose, infusion volume or drug volume when prepared in a syringe, rate of administration, route of administration,  expiration dates and/or times, appearance and physical integrity of the drugs, rate set on infusion pump, when used sequencing of drug administration.           Ms. Lobito Moser tolerated treatment well and [facility].   Port de-accessed & flushed per protocol. Patient aware of their next appointment.    Dada Murray RN  November 27, 2024    Future Appointments   Date Time Provider Department Center   12/11/2024 10:00 AM  CHEMO CHAIR 12 [facility]   12/11/2024 10:30 AM Doyle Underwood MD [facility]   12/18/2024 10:30 AM  CHEMO CHAIR 13 [facility]   12/31/2024  8:30 AM  CHEMO CHAIR 9 [facility]

## 2024-11-27 NOTE — PROGRESS NOTES
Debra Moser is a 64 y.o. female is here today for a breast cancer follow up.     1. Have you been to the ER, urgent care clinic since your last visit?  Hospitalized since your last visit?No    2. Have you seen or consulted any other health care providers outside of the [facility] since your last visit?  Include any pap smears or colon screening. No       11/27/2024  10:15 AM   Vitals    SYSTOLIC 115    DIASTOLIC 61    Site    Position    Pulse 92    Temp 98.2 °F (36.8 °C)    Respirations 16    SpO2 91 %    Weight - Scale 128 lb    Height 5' 2\"    Body Mass Index 23.41 kg/m2    Pain Score    Pain Loc    Pain Level 0        
effusion:                                                                                                                                                                                  She underwent bronchoscopy 11/16/23 with cultures negative at this point.     She then continued with mild cough that was likely due to cancer progression, improved with tessalon 100 mg TID prn. She reports she had improvement with levofloxacin but after completion she had worsening.     Right pleural effusion.  Thoracentesis 6/14/24 with cytology + for metastatic breast cancer, [place] -, HER2 positive by IHC.     7/1/24 right thoracentesis:  250 mL removed    7. HTN: stopped amlodipine, normal bp today    8. AST elevation:  mild, due to cancer and therapy    9. Hypokalemia:  will replete in opic prn    10. L posterior parietal CVA:  referred to neurology, missed appointment, will monitor for now as she is on eliquis    11. Nausea: PRN compazine and ondansetron    12. Mouth sores: Viscous lidocaine ordered previously, none present today.     13. Right upper arm edema: venous doppler negative 6/4/24    14. Acute RUL PE:  7/2/24, on eliquis indefinitely.     15. Constipation:  colace    16. Weight monitoring:  loss of appetite, weight 128 lbs, weight up 1 lb. Taking dexamethasone 1 mg PO daily, refills ordered    17. Multifocal bacterial pneumonia:  sputum grew haemophilus influenxa and Streptococcus pneumonia; s/p augmentin and azithromycin; on home O2     18. Anemia:  due to chemo and cancer, monitoring    19. Itching:  on neck, improved s/p XRT; recommended aloe vera      [phone], fani alexander, son    I appreciate the opportunity to participate in Ms. Debra Alexander's care.    Signed By: Doyle Underwood MD      No follow-ups on file.

## 2024-12-02 ENCOUNTER — CLINICAL DOCUMENTATION (OUTPATIENT)
Facility: HOSPITAL | Age: 64
End: 2024-12-02

## 2024-12-02 NOTE — PROGRESS NOTES
[facility] [place] [facility]  [facility]      RADIATION ONCOLOGY CLINICAL END OF TREATMENT NOTE    Encounter Date: 12/02/24   Patient Name: Debra Moser  YOB: 1960  Medical Record Number: [id number]    DIAGNOSIS:       ICD-10-CM       1. Malignant neoplasm of breast in female, estrogen receptor negative, unspecified laterality, unspecified site of breast (HCC)  C50.919       Z17.1         2. Secondary malignant neoplasm of skin (HCC)  C79.2            STAGING:   Stage IV  AJCC Staging has been reviewed    ASSESSMENT:   63 yo F with metastatic breast cancer       TREATMENT SUMMARY:     Treatment Site Modality Dose/Fx (cGy) #Fx Total Dose (cGy) Start Date End Date Elapsed Days   Bilateral neck/upper chest wall skin  IMRT 400 5 2000 11/19/24 11/24/24 6       CONCURRENT THERAPY: None (held during radiation)    TREATMENT RESPONSE:  Treatment completed as planned      MEDICATIONS:     Current Outpatient Medications:     benzonatate (TESSALON) 100 MG capsule, Take 1 capsule by mouth 3 times daily as needed for Cough, Disp: 30 capsule, Rfl: 5    hydrOXYzine HCl (ATARAX) 25 MG tablet, Take 1 tablet by mouth every 8 hours as needed for Itching, Disp: 30 tablet, Rfl: 3    dexAMETHasone (DECADRON) 1 MG tablet, Take 1 tablet by mouth daily (with breakfast), Disp: 30 tablet, Rfl: 5    apixaban (ELIQUIS) 5 MG TABS tablet, Take 1 tablet by mouth 2 times daily, Disp: 60 tablet, Rfl: 5      UNDER-TREATMENT COURSE SUMMARY:   Overall tolerated treatment well.  She experienced mild grade 1 dermatitis towards the end of treatment, but otherwise no other significant side effects.  Discussed could develop symptoms, including worsening dermatitis, the first week or two after radiation completes.  Otherwise no other significant side effects.        FOLLOW UP:   4-6 weeks        Thank you for the opportunity to participate in Ms. Lobito Moser's care.      Gerry Mercedes,

## 2024-12-03 ENCOUNTER — APPOINTMENT (OUTPATIENT)
Facility: HOSPITAL | Age: 64
End: 2024-12-03
Payer: MEDICAID

## 2024-12-03 ENCOUNTER — TELEPHONE (OUTPATIENT)
Age: 64
End: 2024-12-03

## 2024-12-03 NOTE — TELEPHONE ENCOUNTER
HIPAA Verified (if caller is someone other than patient): N/A       Reason for Call: appt requested      Reason for appointment:   Abnormal Brain Mri    Reason appointment not scheduled at time of call:     Requested Provider:   Thang    Additional Notes (as needed):         Message: (any additional details from patient/caller not covered above)        Level 1 Calls - attempted to reach practice? N/A     Reason Call Marked High Priority (if applicable):

## 2024-12-04 ENCOUNTER — APPOINTMENT (OUTPATIENT)
Facility: HOSPITAL | Age: 64
End: 2024-12-04
Payer: MEDICAID

## 2024-12-04 RX ORDER — ONDANSETRON 2 MG/ML
8 INJECTION INTRAMUSCULAR; INTRAVENOUS
Status: CANCELLED | OUTPATIENT
Start: 2024-12-18

## 2024-12-04 RX ORDER — DIPHENHYDRAMINE HYDROCHLORIDE 50 MG/ML
50 INJECTION INTRAMUSCULAR; INTRAVENOUS
Status: CANCELLED | OUTPATIENT
Start: 2024-12-18

## 2024-12-04 RX ORDER — HEPARIN 100 UNIT/ML
500 SYRINGE INTRAVENOUS PRN
Status: CANCELLED | OUTPATIENT
Start: 2024-12-18

## 2024-12-04 RX ORDER — SODIUM CHLORIDE 0.9 % (FLUSH) 0.9 %
5-40 SYRINGE (ML) INJECTION PRN
Status: CANCELLED | OUTPATIENT
Start: 2024-12-18

## 2024-12-04 RX ORDER — EPINEPHRINE 1 MG/ML
0.3 INJECTION, SOLUTION INTRAMUSCULAR; SUBCUTANEOUS PRN
Status: CANCELLED | OUTPATIENT
Start: 2024-12-18

## 2024-12-04 RX ORDER — PROCHLORPERAZINE EDISYLATE 5 MG/ML
10 INJECTION INTRAMUSCULAR; INTRAVENOUS EVERY 6 HOURS PRN
Status: CANCELLED | OUTPATIENT
Start: 2024-12-18

## 2024-12-04 RX ORDER — SODIUM CHLORIDE 9 MG/ML
5-250 INJECTION, SOLUTION INTRAVENOUS PRN
Status: CANCELLED | OUTPATIENT
Start: 2024-12-18

## 2024-12-04 RX ORDER — ACETAMINOPHEN 325 MG/1
650 TABLET ORAL
Status: CANCELLED | OUTPATIENT
Start: 2024-12-18

## 2024-12-04 RX ORDER — SODIUM CHLORIDE 9 MG/ML
INJECTION, SOLUTION INTRAVENOUS CONTINUOUS
Status: CANCELLED | OUTPATIENT
Start: 2024-12-18

## 2024-12-04 RX ORDER — FAMOTIDINE 10 MG/ML
20 INJECTION, SOLUTION INTRAVENOUS
Status: CANCELLED | OUTPATIENT
Start: 2024-12-18

## 2024-12-04 RX ORDER — ALBUTEROL SULFATE 90 UG/1
4 INHALANT RESPIRATORY (INHALATION) PRN
Status: CANCELLED | OUTPATIENT
Start: 2024-12-18

## 2024-12-04 RX ORDER — HYDROCORTISONE SODIUM SUCCINATE 100 MG/2ML
100 INJECTION INTRAMUSCULAR; INTRAVENOUS
Status: CANCELLED | OUTPATIENT
Start: 2024-12-18

## 2024-12-10 ENCOUNTER — APPOINTMENT (OUTPATIENT)
Facility: HOSPITAL | Age: 64
End: 2024-12-10

## 2024-12-10 ENCOUNTER — APPOINTMENT (OUTPATIENT)
Facility: HOSPITAL | Age: 64
End: 2024-12-10
Payer: MEDICAID

## 2024-12-11 ENCOUNTER — APPOINTMENT (OUTPATIENT)
Facility: HOSPITAL | Age: 64
End: 2024-12-11
Payer: MEDICAID

## 2024-12-11 ENCOUNTER — HOSPITAL ENCOUNTER (OUTPATIENT)
Facility: HOSPITAL | Age: 64
Setting detail: INFUSION SERIES
Discharge: HOME | End: 2024-12-11
Payer: MEDICAID

## 2024-12-11 ENCOUNTER — OFFICE VISIT (OUTPATIENT)
Age: 64
End: 2024-12-11
Payer: MEDICAID

## 2024-12-11 VITALS
WEIGHT: 126 LBS | RESPIRATION RATE: 18 BRPM | SYSTOLIC BLOOD PRESSURE: 120 MMHG | OXYGEN SATURATION: 92 % | HEART RATE: 99 BPM | TEMPERATURE: 98.5 F | DIASTOLIC BLOOD PRESSURE: 77 MMHG | HEIGHT: 62 IN | BODY MASS INDEX: 23.19 KG/M2

## 2024-12-11 VITALS
TEMPERATURE: 98.4 F | SYSTOLIC BLOOD PRESSURE: 114 MMHG | BODY MASS INDEX: 23.19 KG/M2 | DIASTOLIC BLOOD PRESSURE: 55 MMHG | RESPIRATION RATE: 18 BRPM | HEIGHT: 62 IN | OXYGEN SATURATION: 92 % | HEART RATE: 101 BPM | WEIGHT: 126 LBS

## 2024-12-11 DIAGNOSIS — C78.02 MALIGNANT NEOPLASM METASTATIC TO BOTH LUNGS (HCC): ICD-10-CM

## 2024-12-11 DIAGNOSIS — Z17.1 MALIGNANT NEOPLASM OF BREAST IN FEMALE, ESTROGEN RECEPTOR NEGATIVE, UNSPECIFIED LATERALITY, UNSPECIFIED SITE OF BREAST (HCC): ICD-10-CM

## 2024-12-11 DIAGNOSIS — Z51.11 ENCOUNTER FOR ANTINEOPLASTIC CHEMOTHERAPY: ICD-10-CM

## 2024-12-11 DIAGNOSIS — C50.919 MALIGNANT NEOPLASM OF BREAST IN FEMALE, ESTROGEN RECEPTOR NEGATIVE, UNSPECIFIED LATERALITY, UNSPECIFIED SITE OF BREAST (HCC): ICD-10-CM

## 2024-12-11 DIAGNOSIS — C79.51 CARCINOMA OF BREAST METASTATIC TO BONE, UNSPECIFIED LATERALITY (HCC): Primary | ICD-10-CM

## 2024-12-11 DIAGNOSIS — C79.51 CARCINOMA OF BREAST METASTATIC TO BONE, UNSPECIFIED LATERALITY (HCC): ICD-10-CM

## 2024-12-11 DIAGNOSIS — C50.919 CARCINOMA OF BREAST METASTATIC TO BONE, UNSPECIFIED LATERALITY (HCC): ICD-10-CM

## 2024-12-11 DIAGNOSIS — C50.919 METASTASIS FROM BREAST CANCER (HCC): ICD-10-CM

## 2024-12-11 DIAGNOSIS — C79.9 METASTASIS FROM BREAST CANCER (HCC): ICD-10-CM

## 2024-12-11 DIAGNOSIS — C50.919 CARCINOMA OF BREAST METASTATIC TO BONE, UNSPECIFIED LATERALITY (HCC): Primary | ICD-10-CM

## 2024-12-11 DIAGNOSIS — Z51.11 ENCOUNTER FOR ANTINEOPLASTIC CHEMOTHERAPY: Primary | ICD-10-CM

## 2024-12-11 DIAGNOSIS — C78.01 MALIGNANT NEOPLASM METASTATIC TO BOTH LUNGS (HCC): ICD-10-CM

## 2024-12-11 LAB
ALBUMIN SERPL-MCNC: 2.9 G/DL (ref 3.5–5)
ALBUMIN/GLOB SERPL: 0.6 (ref 1.1–2.2)
ALP SERPL-CCNC: 76 U/L (ref 45–117)
ALT SERPL-CCNC: 9 U/L (ref 12–78)
ANION GAP SERPL CALC-SCNC: 5 MMOL/L (ref 2–12)
AST SERPL-CCNC: 42 U/L (ref 15–37)
BASOPHILS # BLD: 0 K/UL (ref 0–0.1)
BASOPHILS NFR BLD: 1 % (ref 0–1)
BILIRUB SERPL-MCNC: 0.3 MG/DL (ref 0.2–1)
BUN SERPL-MCNC: 14 MG/DL (ref 6–20)
BUN/CREAT SERPL: 17 (ref 12–20)
CALCIUM SERPL-MCNC: 9 MG/DL (ref 8.5–10.1)
CHLORIDE SERPL-SCNC: 104 MMOL/L (ref 97–108)
CO2 SERPL-SCNC: 30 MMOL/L (ref 21–32)
CREAT SERPL-MCNC: 0.84 MG/DL (ref 0.55–1.02)
DIFFERENTIAL METHOD BLD: ABNORMAL
EOSINOPHIL # BLD: 0 K/UL (ref 0–0.4)
EOSINOPHIL NFR BLD: 0 % (ref 0–7)
ERYTHROCYTE [DISTWIDTH] IN BLOOD BY AUTOMATED COUNT: 20.4 % (ref 11.5–14.5)
GLOBULIN SER CALC-MCNC: 5 G/DL (ref 2–4)
GLUCOSE SERPL-MCNC: 218 MG/DL (ref 65–100)
HCT VFR BLD AUTO: 32.7 % (ref 35–47)
HGB BLD-MCNC: 9.8 G/DL (ref 11.5–16)
IMM GRANULOCYTES # BLD AUTO: 0 K/UL (ref 0–0.04)
IMM GRANULOCYTES NFR BLD AUTO: 1 % (ref 0–0.5)
LYMPHOCYTES # BLD: 0.5 K/UL (ref 0.8–3.5)
LYMPHOCYTES NFR BLD: 11 % (ref 12–49)
MCH RBC QN AUTO: 25.8 PG (ref 26–34)
MCHC RBC AUTO-ENTMCNC: 30 G/DL (ref 30–36.5)
MCV RBC AUTO: 86.1 FL (ref 80–99)
MONOCYTES # BLD: 0.2 K/UL (ref 0–1)
MONOCYTES NFR BLD: 4 % (ref 5–13)
NEUTS SEG # BLD: 3.7 K/UL (ref 1.8–8)
NEUTS SEG NFR BLD: 83 % (ref 32–75)
NRBC # BLD: 0 K/UL (ref 0–0.01)
NRBC BLD-RTO: 0 PER 100 WBC
PLATELET # BLD AUTO: 426 K/UL (ref 150–400)
PMV BLD AUTO: 8.6 FL (ref 8.9–12.9)
POTASSIUM SERPL-SCNC: 3.6 MMOL/L (ref 3.5–5.1)
PROT SERPL-MCNC: 7.9 G/DL (ref 6.4–8.2)
RBC # BLD AUTO: 3.8 M/UL (ref 3.8–5.2)
RBC MORPH BLD: ABNORMAL
SODIUM SERPL-SCNC: 139 MMOL/L (ref 136–145)
WBC # BLD AUTO: 4.4 K/UL (ref 3.6–11)

## 2024-12-11 PROCEDURE — 85025 COMPLETE CBC W/AUTO DIFF WBC: CPT

## 2024-12-11 PROCEDURE — 36591 DRAW BLOOD OFF VENOUS DEVICE: CPT

## 2024-12-11 PROCEDURE — 80053 COMPREHEN METABOLIC PANEL: CPT

## 2024-12-11 PROCEDURE — 99215 OFFICE O/P EST HI 40 MIN: CPT | Performed by: NURSE PRACTITIONER

## 2024-12-11 RX ORDER — SODIUM CHLORIDE 9 MG/ML
5-250 INJECTION, SOLUTION INTRAVENOUS PRN
OUTPATIENT
Start: 2024-12-18

## 2024-12-11 RX ORDER — ZOLEDRONIC ACID 0.04 MG/ML
4 INJECTION, SOLUTION INTRAVENOUS ONCE
OUTPATIENT
Start: 2024-12-18 | End: 2024-12-18

## 2024-12-11 RX ORDER — TRAMADOL HYDROCHLORIDE 50 MG/1
50 TABLET ORAL EVERY 6 HOURS PRN
Qty: 28 TABLET | Refills: 0 | Status: SHIPPED | OUTPATIENT
Start: 2024-12-11 | End: 2024-12-18

## 2024-12-11 ASSESSMENT — PAIN SCALES - GENERAL: PAINLEVEL_OUTOF10: 0

## 2024-12-11 ASSESSMENT — PATIENT HEALTH QUESTIONNAIRE - PHQ9
SUM OF ALL RESPONSES TO PHQ QUESTIONS 1-9: 1
SUM OF ALL RESPONSES TO PHQ9 QUESTIONS 1 & 2: 1
SUM OF ALL RESPONSES TO PHQ QUESTIONS 1-9: 1
SUM OF ALL RESPONSES TO PHQ QUESTIONS 1-9: 1
1. LITTLE INTEREST OR PLEASURE IN DOING THINGS: NOT AT ALL
2. FEELING DOWN, DEPRESSED OR HOPELESS: SEVERAL DAYS
SUM OF ALL RESPONSES TO PHQ QUESTIONS 1-9: 1

## 2024-12-11 NOTE — PROGRESS NOTES
Debra Moser is a 64 y.o. female is here today for a breast cancer follow up.      1. Have you been to the ER, urgent care clinic since your last visit?  Hospitalized since your last visit?No    2. Have you seen or consulted any other health care providers outside of the [facility] since your last visit?  Include any pap smears or colon screening. No        
                                                                                                                                                  She underwent bronchoscopy 11/16/23 with cultures negative. She then continued with mild cough that was likely due to cancer progression, improved with tessalon 100 mg TID prn. She reports she had improvement with levofloxacin but after completion she had worsening.     Right pleural effusion.  Thoracentesis 6/14/24 with cytology + for metastatic breast cancer, ER/[place] -, HER2 positive by IHC.     7/1/24 right thoracentesis:  250 mL removed    Requiring increased oxygen needs, 3L NC. Repeat CT scheduled for 12/11/24.     7. HTN: stopped amlodipine, normal bp today    8. AST elevation:  mild, due to cancer and therapy    9. Hypokalemia:  will replete in opic prn    10. L posterior parietal CVA:  referred to neurology, missed appointment, will monitor for now as she is on eliquis    11. Nausea: PRN compazine and ondansetron    12. Mouth sores: Viscous lidocaine ordered previously, none present today.     13. Right upper arm edema: venous doppler negative 6/4/24. Improved.     14. Acute RUL PE:  7/2/24, on eliquis indefinitely.     15. Constipation:  colace    16. Weight monitoring:  loss of appetite, weight 126 lbs, weight down 2 lbs. Taking dexamethasone 1 mg PO daily.    17. Multifocal bacterial pneumonia:  sputum grew haemophilus influenxa and Streptococcus pneumonia; s/p augmentin and azithromycin; on home O2.     18. Anemia:  due to chemo and cancer, monitoring    19. Itching:  on neck, improved s/p XRT; recommended aloe vera      [phone], fani alexander, son    I appreciate the opportunity to participate in Ms. Debra Alexander's care.    Signed By: KIMBERLEY Lemos - NP      Return in about 1 week (around 12/18/2024) for labs, nadege/claire, scan review/possible treatment.  
done

## 2024-12-11 NOTE — PROGRESS NOTES
[facility] VISIT NOTE  Date: 2024    Name: Debra Moser    MRN: [id number]         : 1960    Ms. Lobito Moser Arrived ambulatory and in no distress for C4D1 of Vinorelbine/Trastuzumab Regimen (HELD).  Assessment was completed, no acute issues at this time, no new complaints voiced.  Chest wall port accessed by the assessment nurse without difficulty, labs drawn & sent for processing.        Ms. Lobito Moser's vitals were reviewed.  Patient Vitals for the past 12 hrs:   Temp Pulse Resp BP SpO2   24 1025 98.4 °F (36.9 °C) (!) 101 18 (!) 114/55 92 %         Lab results were obtained and reviewed.  Recent Results (from the past 12 hour(s))   Comprehensive metabolic panel    Collection Time: 24 10:31 AM   Result Value Ref Range    Sodium 139 136 - 145 mmol/L    Potassium 3.6 3.5 - 5.1 mmol/L    Chloride 104 97 - 108 mmol/L    CO2 30 21 - 32 mmol/L    Anion Gap 5 2 - 12 mmol/L    Glucose 218 (H) 65 - 100 mg/dL    BUN 14 6 - 20 MG/DL    Creatinine 0.84 0.55 - 1.02 MG/DL    BUN/Creatinine Ratio 17 12 - 20      Est, Glom Filt Rate 78 >60 ml/min/1.73m2    Calcium 9.0 8.5 - 10.1 MG/DL    Total Bilirubin 0.3 0.2 - 1.0 MG/DL    ALT 9 (L) 12 - 78 U/L    AST 42 (H) 15 - 37 U/L    Alk Phosphatase 76 45 - 117 U/L    Total Protein 7.9 6.4 - 8.2 g/dL    Albumin 2.9 (L) 3.5 - 5.0 g/dL    Globulin 5.0 (H) 2.0 - 4.0 g/dL    Albumin/Globulin Ratio 0.6 (L) 1.1 - 2.2     CBC With Auto Differential    Collection Time: 24 10:31 AM   Result Value Ref Range    WBC 4.4 3.6 - 11.0 K/uL    RBC 3.80 3.80 - 5.20 M/uL    Hemoglobin 9.8 (L) 11.5 - 16.0 g/dL    Hematocrit 32.7 (L) 35.0 - 47.0 %    MCV 86.1 80.0 - 99.0 FL    MCH 25.8 (L) 26.0 - 34.0 PG    MCHC 30.0 30.0 - 36.5 g/dL    RDW 20.4 (H) 11.5 - 14.5 %    Platelets 426 (H) 150 - 400 K/uL    MPV 8.6 (L) 8.9 - 12.9 FL    Nucleated RBCs 0.0 0  WBC    nRBC 0.00 0.00 - 0.01 K/uL    Neutrophils % 83 (H) 32 - 75 %    Lymphocytes % 11 (L) 12

## 2024-12-12 ENCOUNTER — HOSPITAL ENCOUNTER (OUTPATIENT)
Facility: HOSPITAL | Age: 64
Discharge: HOME | End: 2024-12-15
Payer: MEDICAID

## 2024-12-12 ENCOUNTER — TELEPHONE (OUTPATIENT)
Age: 64
End: 2024-12-12

## 2024-12-12 DIAGNOSIS — C79.51 CARCINOMA OF BREAST METASTATIC TO BONE, UNSPECIFIED LATERALITY (HCC): ICD-10-CM

## 2024-12-12 DIAGNOSIS — C78.01 MALIGNANT NEOPLASM METASTATIC TO BOTH LUNGS (HCC): ICD-10-CM

## 2024-12-12 DIAGNOSIS — C50.919 CARCINOMA OF BREAST METASTATIC TO BONE, UNSPECIFIED LATERALITY (HCC): ICD-10-CM

## 2024-12-12 DIAGNOSIS — C78.02 MALIGNANT NEOPLASM METASTATIC TO BOTH LUNGS (HCC): ICD-10-CM

## 2024-12-12 DIAGNOSIS — Z51.11 ENCOUNTER FOR ANTINEOPLASTIC CHEMOTHERAPY: ICD-10-CM

## 2024-12-12 PROCEDURE — A9503 TC99M MEDRONATE: HCPCS | Performed by: NURSE PRACTITIONER

## 2024-12-12 PROCEDURE — 74177 CT ABD & PELVIS W/CONTRAST: CPT

## 2024-12-12 PROCEDURE — 3430000000 HC RX DIAGNOSTIC RADIOPHARMACEUTICAL: Performed by: NURSE PRACTITIONER

## 2024-12-12 PROCEDURE — 6360000004 HC RX CONTRAST MEDICATION: Performed by: NURSE PRACTITIONER

## 2024-12-12 PROCEDURE — 78306 BONE IMAGING WHOLE BODY: CPT | Performed by: NURSE PRACTITIONER

## 2024-12-12 RX ORDER — IOPAMIDOL 755 MG/ML
100 INJECTION, SOLUTION INTRAVASCULAR
Status: COMPLETED | OUTPATIENT
Start: 2024-12-12 | End: 2024-12-12

## 2024-12-12 RX ORDER — TC 99M MEDRONATE 20 MG/10ML
25 INJECTION, POWDER, LYOPHILIZED, FOR SOLUTION INTRAVENOUS
Status: COMPLETED | OUTPATIENT
Start: 2024-12-12 | End: 2024-12-12

## 2024-12-12 RX ADMIN — IOPAMIDOL 100 ML: 755 INJECTION, SOLUTION INTRAVENOUS at 08:46

## 2024-12-12 RX ADMIN — TC 99M MEDRONATE 25 MILLICURIE: 20 INJECTION, POWDER, LYOPHILIZED, FOR SOLUTION INTRAVENOUS at 08:42

## 2024-12-12 NOTE — TELEPHONE ENCOUNTER
[facility] [place] [facility]  [phone]    12/12/24 3:30 PM EST - A phone call was made to the patient informing her that an order for a portable oxygen tank was placed today. Patient verbalized understanding and had no further questions.

## 2024-12-17 ENCOUNTER — APPOINTMENT (OUTPATIENT)
Facility: HOSPITAL | Age: 64
End: 2024-12-17
Payer: MEDICAID

## 2024-12-18 ENCOUNTER — TELEPHONE (OUTPATIENT)
Age: 64
End: 2024-12-18

## 2024-12-18 ENCOUNTER — HOSPITAL ENCOUNTER (OUTPATIENT)
Facility: HOSPITAL | Age: 64
Setting detail: INFUSION SERIES
Discharge: HOME | End: 2024-12-18
Payer: MEDICAID

## 2024-12-18 ENCOUNTER — OFFICE VISIT (OUTPATIENT)
Age: 64
End: 2024-12-18
Payer: MEDICAID

## 2024-12-18 ENCOUNTER — HOSPITAL ENCOUNTER (OUTPATIENT)
Facility: HOSPITAL | Age: 64
Setting detail: INFUSION SERIES
End: 2024-12-18
Payer: MEDICAID

## 2024-12-18 VITALS
SYSTOLIC BLOOD PRESSURE: 137 MMHG | RESPIRATION RATE: 18 BRPM | HEART RATE: 99 BPM | BODY MASS INDEX: 23.37 KG/M2 | HEIGHT: 62 IN | WEIGHT: 127 LBS | TEMPERATURE: 98.8 F | OXYGEN SATURATION: 93 % | DIASTOLIC BLOOD PRESSURE: 69 MMHG

## 2024-12-18 VITALS
OXYGEN SATURATION: 93 % | SYSTOLIC BLOOD PRESSURE: 137 MMHG | RESPIRATION RATE: 18 BRPM | WEIGHT: 127.3 LBS | DIASTOLIC BLOOD PRESSURE: 69 MMHG | HEART RATE: 99 BPM | BODY MASS INDEX: 23.28 KG/M2 | TEMPERATURE: 98.8 F

## 2024-12-18 DIAGNOSIS — C50.919 METASTASIS FROM BREAST CANCER (HCC): ICD-10-CM

## 2024-12-18 DIAGNOSIS — Z51.11 ENCOUNTER FOR ANTINEOPLASTIC CHEMOTHERAPY: ICD-10-CM

## 2024-12-18 DIAGNOSIS — C79.51 CARCINOMA OF BREAST METASTATIC TO BONE, UNSPECIFIED LATERALITY (HCC): Primary | ICD-10-CM

## 2024-12-18 DIAGNOSIS — C78.01 MALIGNANT NEOPLASM METASTATIC TO BOTH LUNGS (HCC): Primary | ICD-10-CM

## 2024-12-18 DIAGNOSIS — C79.9 METASTASIS FROM BREAST CANCER (HCC): ICD-10-CM

## 2024-12-18 DIAGNOSIS — Z17.1 MALIGNANT NEOPLASM OF BREAST IN FEMALE, ESTROGEN RECEPTOR NEGATIVE, UNSPECIFIED LATERALITY, UNSPECIFIED SITE OF BREAST (HCC): ICD-10-CM

## 2024-12-18 DIAGNOSIS — C50.919 CARCINOMA OF BREAST METASTATIC TO BONE, UNSPECIFIED LATERALITY (HCC): Primary | ICD-10-CM

## 2024-12-18 DIAGNOSIS — C50.919 MALIGNANT NEOPLASM OF BREAST IN FEMALE, ESTROGEN RECEPTOR NEGATIVE, UNSPECIFIED LATERALITY, UNSPECIFIED SITE OF BREAST (HCC): ICD-10-CM

## 2024-12-18 DIAGNOSIS — C79.51 CARCINOMA OF BREAST METASTATIC TO BONE, UNSPECIFIED LATERALITY (HCC): ICD-10-CM

## 2024-12-18 DIAGNOSIS — C78.02 MALIGNANT NEOPLASM METASTATIC TO BOTH LUNGS (HCC): Primary | ICD-10-CM

## 2024-12-18 DIAGNOSIS — C50.919 CARCINOMA OF BREAST METASTATIC TO BONE, UNSPECIFIED LATERALITY (HCC): ICD-10-CM

## 2024-12-18 LAB
ALBUMIN SERPL-MCNC: 2.8 G/DL (ref 3.5–5)
ALBUMIN/GLOB SERPL: 0.5 (ref 1.1–2.2)
ALP SERPL-CCNC: 78 U/L (ref 45–117)
ALT SERPL-CCNC: 8 U/L (ref 12–78)
ANION GAP SERPL CALC-SCNC: 6 MMOL/L (ref 2–12)
AST SERPL-CCNC: 44 U/L (ref 15–37)
BASOPHILS # BLD: 0.1 K/UL (ref 0–0.1)
BASOPHILS NFR BLD: 1 % (ref 0–1)
BILIRUB SERPL-MCNC: 0.3 MG/DL (ref 0.2–1)
BUN SERPL-MCNC: 12 MG/DL (ref 6–20)
BUN/CREAT SERPL: 19 (ref 12–20)
CALCIUM SERPL-MCNC: 9.2 MG/DL (ref 8.5–10.1)
CHLORIDE SERPL-SCNC: 102 MMOL/L (ref 97–108)
CO2 SERPL-SCNC: 30 MMOL/L (ref 21–32)
CREAT SERPL-MCNC: 0.63 MG/DL (ref 0.55–1.02)
DIFFERENTIAL METHOD BLD: ABNORMAL
EOSINOPHIL # BLD: 0 K/UL (ref 0–0.4)
EOSINOPHIL NFR BLD: 0 % (ref 0–7)
ERYTHROCYTE [DISTWIDTH] IN BLOOD BY AUTOMATED COUNT: 20 % (ref 11.5–14.5)
GLOBULIN SER CALC-MCNC: 5.4 G/DL (ref 2–4)
GLUCOSE SERPL-MCNC: 201 MG/DL (ref 65–100)
HCT VFR BLD AUTO: 33.8 % (ref 35–47)
HGB BLD-MCNC: 10.1 G/DL (ref 11.5–16)
IMM GRANULOCYTES # BLD AUTO: 0 K/UL (ref 0–0.04)
IMM GRANULOCYTES NFR BLD AUTO: 0 % (ref 0–0.5)
LYMPHOCYTES # BLD: 0.5 K/UL (ref 0.8–3.5)
LYMPHOCYTES NFR BLD: 6 % (ref 12–49)
MCH RBC QN AUTO: 25.8 PG (ref 26–34)
MCHC RBC AUTO-ENTMCNC: 29.9 G/DL (ref 30–36.5)
MCV RBC AUTO: 86.4 FL (ref 80–99)
MONOCYTES # BLD: 0.3 K/UL (ref 0–1)
MONOCYTES NFR BLD: 3 % (ref 5–13)
NEUTS SEG # BLD: 7.8 K/UL (ref 1.8–8)
NEUTS SEG NFR BLD: 90 % (ref 32–75)
NRBC # BLD: 0 K/UL (ref 0–0.01)
NRBC BLD-RTO: 0 PER 100 WBC
PLATELET # BLD AUTO: 406 K/UL (ref 150–400)
PMV BLD AUTO: 8.4 FL (ref 8.9–12.9)
POTASSIUM SERPL-SCNC: 3.6 MMOL/L (ref 3.5–5.1)
PROT SERPL-MCNC: 8.2 G/DL (ref 6.4–8.2)
RBC # BLD AUTO: 3.91 M/UL (ref 3.8–5.2)
RBC MORPH BLD: ABNORMAL
RBC MORPH BLD: ABNORMAL
SODIUM SERPL-SCNC: 138 MMOL/L (ref 136–145)
WBC # BLD AUTO: 8.7 K/UL (ref 3.6–11)

## 2024-12-18 PROCEDURE — 80053 COMPREHEN METABOLIC PANEL: CPT

## 2024-12-18 PROCEDURE — 3078F DIAST BP <80 MM HG: CPT | Performed by: INTERNAL MEDICINE

## 2024-12-18 PROCEDURE — 3075F SYST BP GE 130 - 139MM HG: CPT | Performed by: INTERNAL MEDICINE

## 2024-12-18 PROCEDURE — 36415 COLL VENOUS BLD VENIPUNCTURE: CPT

## 2024-12-18 PROCEDURE — 85025 COMPLETE CBC W/AUTO DIFF WBC: CPT

## 2024-12-18 PROCEDURE — 99215 OFFICE O/P EST HI 40 MIN: CPT | Performed by: INTERNAL MEDICINE

## 2024-12-18 ASSESSMENT — PATIENT HEALTH QUESTIONNAIRE - PHQ9
1. LITTLE INTEREST OR PLEASURE IN DOING THINGS: NOT AT ALL
SUM OF ALL RESPONSES TO PHQ QUESTIONS 1-9: 0
2. FEELING DOWN, DEPRESSED OR HOPELESS: NOT AT ALL
SUM OF ALL RESPONSES TO PHQ QUESTIONS 1-9: 0
SUM OF ALL RESPONSES TO PHQ9 QUESTIONS 1 & 2: 0

## 2024-12-18 NOTE — TELEPHONE ENCOUNTER
12/18/24 1:14 p.m.- Demographics, referral, copy of insurance card and office notes faxed to [facility] requesting patient be seen today.   12/18/24 1:50 p.m.- Called and spoke to Melissa at [facility] and confirmed that she had received patient's information via fax and that they would reach out to family and try to get out to see patient today. Melissa denies any further needs at this time.

## 2024-12-18 NOTE — PROGRESS NOTES
Debra Moser is a 64 y.o. female is here today for a breast cancer follow up.    1. Have you been to the ER, urgent care clinic since your last visit?  Hospitalized since your last visit?No    2. Have you seen or consulted any other health care providers outside of the [facility] since your last visit?  Include any pap smears or colon screening. No       12/18/2024  10:45 AM   Vitals    SYSTOLIC 137    DIASTOLIC 69    Site    Position    Pulse 99    Temp 98.8 °F (37.1 °C)    Respirations 18    SpO2 93 %    Weight - Scale 127 lb 4.8 oz    Height    Body Mass Index        
12/12/24    Discussed that at this time, further therapy is unlikely to provide any benefit, and would likely be harmful.  Discussed hospice.  She has < 6 months to live.  She and her family are agreeable.  Ordered.      Discussed that she may have weeks to months to live    2. Bone Metastases: Discussed zometa 4 mg q 3 months IV:    Recommendation is for hospice.     3. Emotional well being:  She has excellent support and is coping well with her disease    4. DM2:  was on metformin, no longer taking    5. Right Shoulder pain:  on celebrex; she has started PT. Now worsened and described as \"pain to the bone.\" Bone scan ordered. Tramadol 50mg q6hrs PRN pain    Recommend hospice    6.  Right pleural effusion:                                                                                                                                                                                  She underwent bronchoscopy 11/16/23 with cultures negative. She then continued with mild cough that was likely due to cancer progression, improved with tessalon 100 mg TID prn. She reports she had improvement with levofloxacin but after completion she had worsening.     Right pleural effusion.  Thoracentesis 6/14/24 with cytology + for metastatic breast cancer, [place] -, HER2 positive by IHC.     7/1/24 right thoracentesis:  250 mL removed    Requiring increased oxygen needs, 3L NC. Repeat CT shows progression    7. HTN: stopped amlodipine, normal bp today    8. AST elevation:  mild, due to cancer and therapy    9. Nausea: PRN compazine and ondansetron    10. Mouth sores: Viscous lidocaine ordered previously    11. Right upper arm edema: venous doppler negative 6/4/24. Improved.     12. Acute RUL PE:  7/2/24, on eliquis indefinitely.     13. Anemia:  due to chemo and cancer, monitoring    14. Itching:  on neck, improved s/p XRT; recommended aloe vera      [phone], fani alexander, son    I appreciate the opportunity to participate in Ms.

## 2024-12-26 ENCOUNTER — TELEPHONE (OUTPATIENT)
Age: 64
End: 2024-12-26

## 2024-12-26 NOTE — TELEPHONE ENCOUNTER
Patients son called with [employer] , and stated that the patient needs prescription or letter stating that the patient needs continuous oxygen so they can order it through the oxygen company. He stated that this and the letter before can also be emailed to him at the email below.     Email: [email or web address]  CB# [phone]

## 2024-12-26 NOTE — TELEPHONE ENCOUNTER
Patients daughter called with [employer], , and stated that the patient is wanting to go back to her home country of [place] since there is no cure for her disease. She stated that they would like a letter stating the patients illness and that she will no longer be receiving treatment. She also stated that they would need it to say that she cannot be without her oxygen so they don't have any trouble going through the airport. Daughter also mentioned them wanting the port to be removed as well since the patient will no longer be receiving treatment. Requested a call back when letter was ready to be picked up.     # [phone]

## 2024-12-26 NOTE — TELEPHONE ENCOUNTER
[facility] [place] at [facility]  [phone]    12/26/24- Phone call returned to pt son/patient. Advised them letter has been sent over via email. They confirmed they received letter. Also informed them per Rochelle ROBLERO- pt is getting oxygen needs from hospice. They should reach out to them for any needs regarding that. She also inquired if she can have her port removed prior to leaving to go to [facility] tomorrow. Advised her per Rochelle ok to keep port in place. Pt/ son denied any further questions or concerns.       Phone call translated by ID #[id number]

## 2025-02-11 ENCOUNTER — APPOINTMENT (OUTPATIENT)
Facility: HOSPITAL | Age: 65
End: 2025-02-11
Payer: MEDICAID

## (undated) DEVICE — SYRINGE MED 5ML STD CLR PLAS LUERLOCK TIP N CTRL DISP

## (undated) DEVICE — CONTAINER SPEC 20 ML LID NEUT BUFF FORMALIN 10 % POLYPR STS

## (undated) DEVICE — SYRINGE MED 10CC ECC TIP W/O NDL

## (undated) DEVICE — BLUNTFILL: Brand: MONOJECT

## (undated) DEVICE — KIT COLON W/ 1.1OZ LUB AND 2 END

## (undated) DEVICE — 1200 GUARD II KIT W/5MM TUBE W/O VAC TUBE: Brand: GUARDIAN

## (undated) DEVICE — BASIN EMSIS 16OZ GRAPHITE PLAS KID SHP MOLD GRAD FOR ORAL

## (undated) DEVICE — AIRLIFE™ CORRUGATED FLEXIBLE EVA TUBING FOR AEROSOL AND IPPB USE, SEGMENTED, 6 FEET (1.8 M) LENGTH, 22 MM I.D.: Brand: AIRLIFE™

## (undated) DEVICE — TRAP,MUCUS SPECIMEN, 80CC: Brand: MEDLINE

## (undated) DEVICE — IV STRT KT 3282] LSL INDUSTRIES INC]

## (undated) DEVICE — SOLIDIFIER FLUID 1.3 OZ GEL 1200CC NS

## (undated) DEVICE — BLUNTFILL WITH FILTER: Brand: MONOJECT

## (undated) DEVICE — ELECTRODE,RADIOTRANSLUCENT,FOAM,3PK: Brand: MEDLINE

## (undated) DEVICE — CATHETER IV 22GA L1IN OD0.8382-0.9144MM ID0.6096-0.6858MM 382523

## (undated) DEVICE — BITEBLOCK ENDOSCP 60FR MAXI WHT POLYETH STURDY W/ VELC WVN

## (undated) DEVICE — BRUSH CYTO L140CM DIA1.5MM WRK CHN 2MM BRIST SHTH RNG HNDL

## (undated) DEVICE — SYRINGE MED 3ML CLR PLAS STD N CTRL LUERLOCK TIP DISP

## (undated) DEVICE — SET ADMIN 16ML TBNG L100IN 2 Y INJ SITE IV PIGGY BK DISP (ORDER IN MULIPLES OF 48)

## (undated) DEVICE — FORCEPS BX L100CM DIA1.8MM WRK CHN 2MM PULM S STL RAD JAW 4